# Patient Record
Sex: MALE | Race: OTHER | Employment: OTHER | URBAN - NONMETROPOLITAN AREA
[De-identification: names, ages, dates, MRNs, and addresses within clinical notes are randomized per-mention and may not be internally consistent; named-entity substitution may affect disease eponyms.]

---

## 2018-03-22 ENCOUNTER — HOSPITAL ENCOUNTER (EMERGENCY)
Age: 28
Discharge: HOME | End: 2018-03-22
Attending: EMERGENCY MEDICINE
Payer: COMMERCIAL

## 2018-03-22 VITALS
TEMPERATURE: 97.4 F | RESPIRATION RATE: 16 BRPM | WEIGHT: 197 LBS | SYSTOLIC BLOOD PRESSURE: 129 MMHG | BODY MASS INDEX: 29.18 KG/M2 | OXYGEN SATURATION: 94 % | HEIGHT: 69 IN | HEART RATE: 62 BPM | DIASTOLIC BLOOD PRESSURE: 74 MMHG

## 2018-03-22 DIAGNOSIS — R10.32 LEFT LOWER QUADRANT PAIN: Primary | ICD-10-CM

## 2018-03-22 DIAGNOSIS — Z87.19 HISTORY OF DIVERTICULITIS: ICD-10-CM

## 2018-03-22 LAB
ABSOLUTE EOS #: 0.1 K/UL (ref 0–0.4)
ABSOLUTE IMMATURE GRANULOCYTE: ABNORMAL K/UL (ref 0–0.3)
ABSOLUTE LYMPH #: 3.8 K/UL (ref 1–4.8)
ABSOLUTE MONO #: 1 K/UL (ref 0.1–1.2)
ALBUMIN SERPL-MCNC: 5 G/DL (ref 3.5–5.2)
ALBUMIN/GLOBULIN RATIO: 1.8 (ref 1–2.5)
ALP BLD-CCNC: 91 U/L (ref 40–129)
ALT SERPL-CCNC: 25 U/L (ref 5–41)
ANION GAP SERPL CALCULATED.3IONS-SCNC: 17 MMOL/L (ref 9–17)
AST SERPL-CCNC: 13 U/L
BASOPHILS # BLD: 1 % (ref 0–2)
BASOPHILS ABSOLUTE: 0.1 K/UL (ref 0–0.2)
BILIRUB SERPL-MCNC: 0.79 MG/DL (ref 0.3–1.2)
BUN BLDV-MCNC: 12 MG/DL (ref 6–20)
BUN/CREAT BLD: 14 (ref 9–20)
CALCIUM SERPL-MCNC: 10.1 MG/DL (ref 8.6–10.4)
CHLORIDE BLD-SCNC: 99 MMOL/L (ref 98–107)
CO2: 24 MMOL/L (ref 20–31)
CREAT SERPL-MCNC: 0.84 MG/DL (ref 0.7–1.2)
DIFFERENTIAL TYPE: ABNORMAL
EOSINOPHILS RELATIVE PERCENT: 1 % (ref 1–8)
GFR AFRICAN AMERICAN: >60 ML/MIN
GFR NON-AFRICAN AMERICAN: >60 ML/MIN
GFR SERPL CREATININE-BSD FRML MDRD: ABNORMAL ML/MIN/{1.73_M2}
GFR SERPL CREATININE-BSD FRML MDRD: ABNORMAL ML/MIN/{1.73_M2}
GLUCOSE BLD-MCNC: 109 MG/DL (ref 70–99)
HCT VFR BLD CALC: 47.2 % (ref 41–53)
HEMOGLOBIN: 15.8 G/DL (ref 13.5–17.5)
IMMATURE GRANULOCYTES: ABNORMAL %
LYMPHOCYTES # BLD: 26 % (ref 15–43)
MCH RBC QN AUTO: 30.5 PG (ref 26–34)
MCHC RBC AUTO-ENTMCNC: 33.5 G/DL (ref 31–37)
MCV RBC AUTO: 91.1 FL (ref 80–100)
MONOCYTES # BLD: 7 % (ref 6–14)
NRBC AUTOMATED: ABNORMAL PER 100 WBC
PDW BLD-RTO: 13.7 % (ref 11–14.5)
PLATELET # BLD: 432 K/UL (ref 140–450)
PLATELET ESTIMATE: ABNORMAL
PMV BLD AUTO: 7.7 FL (ref 6–12)
POTASSIUM SERPL-SCNC: 4.1 MMOL/L (ref 3.7–5.3)
RBC # BLD: 5.19 M/UL (ref 4.5–5.9)
RBC # BLD: ABNORMAL 10*6/UL
SEG NEUTROPHILS: 65 % (ref 44–74)
SEGMENTED NEUTROPHILS ABSOLUTE COUNT: 9.5 K/UL (ref 1.8–7.7)
SODIUM BLD-SCNC: 140 MMOL/L (ref 135–144)
TOTAL PROTEIN: 7.8 G/DL (ref 6.4–8.3)
WBC # BLD: 14.5 K/UL (ref 3.5–11)
WBC # BLD: ABNORMAL 10*3/UL

## 2018-03-22 PROCEDURE — 80053 COMPREHEN METABOLIC PANEL: CPT

## 2018-03-22 PROCEDURE — 6360000002 HC RX W HCPCS: Performed by: EMERGENCY MEDICINE

## 2018-03-22 PROCEDURE — 96375 TX/PRO/DX INJ NEW DRUG ADDON: CPT

## 2018-03-22 PROCEDURE — 36415 COLL VENOUS BLD VENIPUNCTURE: CPT

## 2018-03-22 PROCEDURE — 2580000003 HC RX 258: Performed by: EMERGENCY MEDICINE

## 2018-03-22 PROCEDURE — 85025 COMPLETE CBC W/AUTO DIFF WBC: CPT

## 2018-03-22 PROCEDURE — 99284 EMERGENCY DEPT VISIT MOD MDM: CPT

## 2018-03-22 PROCEDURE — 96374 THER/PROPH/DIAG INJ IV PUSH: CPT

## 2018-03-22 RX ORDER — ONDANSETRON 2 MG/ML
4 INJECTION INTRAMUSCULAR; INTRAVENOUS ONCE
Status: COMPLETED | OUTPATIENT
Start: 2018-03-22 | End: 2018-03-22

## 2018-03-22 RX ORDER — ONDANSETRON 4 MG/1
4 TABLET, ORALLY DISINTEGRATING ORAL ONCE
Status: COMPLETED | OUTPATIENT
Start: 2018-03-22 | End: 2018-03-22

## 2018-03-22 RX ORDER — 0.9 % SODIUM CHLORIDE 0.9 %
1000 INTRAVENOUS SOLUTION INTRAVENOUS ONCE
Status: COMPLETED | OUTPATIENT
Start: 2018-03-22 | End: 2018-03-22

## 2018-03-22 RX ORDER — HYDROCODONE BITARTRATE AND ACETAMINOPHEN 5; 325 MG/1; MG/1
1 TABLET ORAL EVERY 6 HOURS PRN
Qty: 20 TABLET | Refills: 0 | Status: SHIPPED | OUTPATIENT
Start: 2018-03-22 | End: 2018-03-29

## 2018-03-22 RX ORDER — METRONIDAZOLE 500 MG/1
500 TABLET ORAL 3 TIMES DAILY
Qty: 21 TABLET | Refills: 0 | Status: SHIPPED | OUTPATIENT
Start: 2018-03-22 | End: 2018-03-29

## 2018-03-22 RX ORDER — DIPHENOXYLATE HYDROCHLORIDE AND ATROPINE SULFATE 2.5; .025 MG/1; MG/1
1 TABLET ORAL 4 TIMES DAILY PRN
Qty: 20 TABLET | Refills: 0 | Status: SHIPPED | OUTPATIENT
Start: 2018-03-22 | End: 2018-04-01

## 2018-03-22 RX ORDER — FENTANYL CITRATE 50 UG/ML
50 INJECTION, SOLUTION INTRAMUSCULAR; INTRAVENOUS ONCE
Status: COMPLETED | OUTPATIENT
Start: 2018-03-22 | End: 2018-03-22

## 2018-03-22 RX ORDER — ONDANSETRON 4 MG/1
4 TABLET, ORALLY DISINTEGRATING ORAL EVERY 8 HOURS PRN
Qty: 20 TABLET | Refills: 0 | Status: SHIPPED | OUTPATIENT
Start: 2018-03-22 | End: 2018-07-30 | Stop reason: SDUPTHER

## 2018-03-22 RX ADMIN — ONDANSETRON 4 MG: 2 INJECTION INTRAMUSCULAR; INTRAVENOUS at 15:48

## 2018-03-22 RX ADMIN — ONDANSETRON 4 MG: 4 TABLET, ORALLY DISINTEGRATING ORAL at 14:38

## 2018-03-22 RX ADMIN — FENTANYL CITRATE 50 MCG: 50 INJECTION INTRAMUSCULAR; INTRAVENOUS at 15:52

## 2018-03-22 RX ADMIN — SODIUM CHLORIDE 1000 ML: 9 INJECTION, SOLUTION INTRAVENOUS at 15:45

## 2018-03-22 ASSESSMENT — PAIN DESCRIPTION - PAIN TYPE
TYPE: ACUTE PAIN
TYPE: ACUTE PAIN

## 2018-03-22 ASSESSMENT — PAIN DESCRIPTION - LOCATION
LOCATION: ABDOMEN
LOCATION: ABDOMEN

## 2018-03-22 ASSESSMENT — PAIN DESCRIPTION - ORIENTATION
ORIENTATION: MID
ORIENTATION: MID;LEFT

## 2018-03-22 ASSESSMENT — PAIN DESCRIPTION - DESCRIPTORS: DESCRIPTORS: BURNING

## 2018-03-22 ASSESSMENT — PAIN SCALES - GENERAL
PAINLEVEL_OUTOF10: 2
PAINLEVEL_OUTOF10: 6

## 2018-03-22 ASSESSMENT — PAIN DESCRIPTION - FREQUENCY: FREQUENCY: INTERMITTENT

## 2018-03-22 NOTE — ED PROVIDER NOTES
indicated that the status of his brother is unknown.      family history includes Allergies in his brother; Cancer in his maternal uncle; Crohn's Disease in his maternal uncle; Kidney Disease in his mother; Other in his mother. SOCIAL HISTORY      reports that he has been smoking Cigarettes. He has been smoking about 0.50 packs per day. He has never used smokeless tobacco. He reports that he drinks about 1.2 oz of alcohol per week . He reports that he does not use drugs. PHYSICAL EXAM     INITIAL VITALS:  height is 5' 9\" (1.753 m) and weight is 197 lb (89.4 kg). His tympanic temperature is 97.4 °F (36.3 °C). His blood pressure is 129/74 and his pulse is 62. His respiration is 16 and oxygen saturation is 94%. Patient is alert and oriented, in no apparent distress. HEENT is atraumatic. Pupils are PERRL at 4 mm. Mucous membranes moist.    Neck is supple with no lymphadenopathy. No JVD. No meningismus. Heart sounds regular rate and rhythm with no gallops, murmurs, or rubs. Lungs clear, no wheezes, rales or rhonchi. Abdomen: soft, nontender with no pain to palpation. Normal bowel sounds are noted. No rebound or guarding. Musculoskeletal exam shows no evidence of trauma. Skin: no rash or edema. Neurological exam reveals cranial nerves 2 through 12 grossly intact. Patient has equal  and normal deep tendon reflexes. Psychiatric: appropriate. Lymphatics.:  No lymphadenopathy.        DIFFERENTIAL DIAGNOSIS/ MDM:     Diverticulitis, anemia, GI bleed    DIAGNOSTIC RESULTS         LABS:  Results for orders placed or performed during the hospital encounter of 03/22/18   CBC Auto Differential   Result Value Ref Range    WBC 14.5 (H) 3.5 - 11.0 k/uL    RBC 5.19 4.5 - 5.9 m/uL    Hemoglobin 15.8 13.5 - 17.5 g/dL    Hematocrit 47.2 41 - 53 %    MCV 91.1 80 - 100 fL    MCH 30.5 26 - 34 pg    MCHC 33.5 31 - 37 g/dL    RDW 13.7 11.0 - 14.5 %    Platelets 369 888 - 920 k/uL    MPV 7.7 6.0 - 12.0 fL    NRBC Automated NOT REPORTED per 100 WBC    Differential Type NOT REPORTED     Immature Granulocytes NOT REPORTED 0 %    Absolute Immature Granulocyte NOT REPORTED 0.00 - 0.30 k/uL    WBC Morphology NOT REPORTED     RBC Morphology NOT REPORTED     Platelet Estimate NOT REPORTED     Seg Neutrophils 65 44 - 74 %    Lymphocytes 26 15 - 43 %    Monocytes 7 6 - 14 %    Eosinophils % 1 1 - 8 %    Basophils 1 0 - 2 %    Segs Absolute 9.50 (H) 1.8 - 7.7 k/uL    Absolute Lymph # 3.80 1.0 - 4.8 k/uL    Absolute Mono # 1.00 0.1 - 1.2 k/uL    Absolute Eos # 0.10 0.0 - 0.4 k/uL    Basophils # 0.10 0.0 - 0.2 k/uL   Comprehensive Metabolic Panel   Result Value Ref Range    Glucose 109 (H) 70 - 99 mg/dL    BUN 12 6 - 20 mg/dL    CREATININE 0.84 0.70 - 1.20 mg/dL    Bun/Cre Ratio 14 9 - 20    Calcium 10.1 8.6 - 10.4 mg/dL    Sodium 140 135 - 144 mmol/L    Potassium 4.1 3.7 - 5.3 mmol/L    Chloride 99 98 - 107 mmol/L    CO2 24 20 - 31 mmol/L    Anion Gap 17 9 - 17 mmol/L    Alkaline Phosphatase 91 40 - 129 U/L    ALT 25 5 - 41 U/L    AST 13 <40 U/L    Total Bilirubin 0.79 0.3 - 1.2 mg/dL    Total Protein 7.8 6.4 - 8.3 g/dL    Alb 5.0 3.5 - 5.2 g/dL    Albumin/Globulin Ratio 1.8 1.0 - 2.5    GFR Non-African American >60 >60 mL/min    GFR African American >60 >60 mL/min    GFR Comment          GFR Staging NOT REPORTED          EMERGENCY DEPARTMENT COURSE:   Vitals:    Vitals:    03/22/18 1427   BP: 129/74   Pulse: 62   Resp: 16   Temp: 97.4 °F (36.3 °C)   TempSrc: Tympanic   SpO2: 94%   Weight: 197 lb (89.4 kg)   Height: 5' 9\" (1.753 m)     -------------------------  BP: 129/74, Temp: 97.4 °F (36.3 °C), Pulse: 62, Resp: 16      Re-evaluation Notes    The patient has a slightly elevated white count consistent with diverticulitis. I will treat him accordingly with Flagyl. I have written for medicine for nausea, vomiting, and pain. The patient had improvement with medications in the emergency department.   He would like to try going home. I do not think he has a surgical presentation. He is discharged in good condition. Controlled Substances Monitoring: The Prescription Monitoring Report for this patient was reviewed today. Tammi Chavarria MD)        FINAL IMPRESSION      1. Left lower quadrant pain    2. History of diverticulitis          DISPOSITION/PLAN   DISPOSITION        Condition on Disposition    good    PATIENT REFERRED TO:  your doctor    In 3 days        DISCHARGE MEDICATIONS:  New Prescriptions    DIPHENOXYLATE-ATROPINE (LOMOTIL) 2.5-0.025 MG PER TABLET    Take 1 tablet by mouth 4 times daily as needed for Diarrhea for up to 10 days. HYDROCODONE-ACETAMINOPHEN (NORCO) 5-325 MG PER TABLET    Take 1 tablet by mouth every 6 hours as needed for Pain for up to 7 days.     METRONIDAZOLE (FLAGYL) 500 MG TABLET    Take 1 tablet by mouth 3 times daily for 7 days    ONDANSETRON (ZOFRAN ODT) 4 MG DISINTEGRATING TABLET    Take 1 tablet by mouth every 8 hours as needed for Nausea       (Please note that portions of this note were completed with a voice recognition program.  Efforts were made to edit the dictations but occasionally words are mis-transcribed.)    Linn MD   Attending Emergency Physician         Tammi Chavarria MD  03/22/18 0852

## 2018-06-01 ENCOUNTER — HOSPITAL ENCOUNTER (EMERGENCY)
Age: 28
Discharge: HOME | End: 2018-06-01
Attending: EMERGENCY MEDICINE
Payer: MEDICAID

## 2018-06-01 ENCOUNTER — APPOINTMENT (OUTPATIENT)
Dept: CT IMAGING | Age: 28
End: 2018-06-01
Payer: MEDICAID

## 2018-06-01 ENCOUNTER — APPOINTMENT (OUTPATIENT)
Dept: ULTRASOUND IMAGING | Age: 28
End: 2018-06-01
Payer: MEDICAID

## 2018-06-01 VITALS
SYSTOLIC BLOOD PRESSURE: 128 MMHG | WEIGHT: 200 LBS | TEMPERATURE: 98.6 F | RESPIRATION RATE: 12 BRPM | BODY MASS INDEX: 29.53 KG/M2 | HEART RATE: 58 BPM | OXYGEN SATURATION: 99 % | DIASTOLIC BLOOD PRESSURE: 86 MMHG

## 2018-06-01 DIAGNOSIS — R10.9 RIGHT FLANK PAIN: Primary | ICD-10-CM

## 2018-06-01 LAB
-: ABNORMAL
ABSOLUTE EOS #: 0.1 K/UL (ref 0–0.4)
ABSOLUTE IMMATURE GRANULOCYTE: ABNORMAL K/UL (ref 0–0.3)
ABSOLUTE LYMPH #: 3.8 K/UL (ref 1–4.8)
ABSOLUTE MONO #: 0.7 K/UL (ref 0.1–1.2)
ALBUMIN SERPL-MCNC: 4.4 G/DL (ref 3.5–5.2)
ALBUMIN/GLOBULIN RATIO: 1.5 (ref 1–2.5)
ALP BLD-CCNC: 89 U/L (ref 40–129)
ALT SERPL-CCNC: 18 U/L (ref 5–41)
AMORPHOUS: ABNORMAL
AMYLASE: 62 U/L (ref 28–100)
ANION GAP SERPL CALCULATED.3IONS-SCNC: 18 MMOL/L (ref 9–17)
AST SERPL-CCNC: 11 U/L
BACTERIA: ABNORMAL
BASOPHILS # BLD: 0 % (ref 0–2)
BASOPHILS ABSOLUTE: 0.1 K/UL (ref 0–0.2)
BILIRUB SERPL-MCNC: 0.67 MG/DL (ref 0.3–1.2)
BILIRUBIN DIRECT: 0.14 MG/DL
BILIRUBIN URINE: NEGATIVE
BILIRUBIN, INDIRECT: 0.53 MG/DL (ref 0–1)
BUN BLDV-MCNC: 10 MG/DL (ref 6–20)
BUN/CREAT BLD: 13 (ref 9–20)
CALCIUM SERPL-MCNC: 9.5 MG/DL (ref 8.6–10.4)
CASTS UA: ABNORMAL /LPF (ref 0–2)
CHLORIDE BLD-SCNC: 102 MMOL/L (ref 98–107)
CO2: 19 MMOL/L (ref 20–31)
COLOR: ABNORMAL
COMMENT UA: ABNORMAL
CREAT SERPL-MCNC: 0.75 MG/DL (ref 0.7–1.2)
CRYSTALS, UA: ABNORMAL /HPF
DIFFERENTIAL TYPE: ABNORMAL
EOSINOPHILS RELATIVE PERCENT: 1 % (ref 1–8)
EPITHELIAL CELLS UA: ABNORMAL /HPF (ref 0–5)
GFR AFRICAN AMERICAN: >60 ML/MIN
GFR NON-AFRICAN AMERICAN: >60 ML/MIN
GFR SERPL CREATININE-BSD FRML MDRD: ABNORMAL ML/MIN/{1.73_M2}
GFR SERPL CREATININE-BSD FRML MDRD: ABNORMAL ML/MIN/{1.73_M2}
GLOBULIN: 3 G/DL (ref 1.5–3.8)
GLUCOSE BLD-MCNC: 110 MG/DL (ref 70–99)
GLUCOSE URINE: NEGATIVE
HCT VFR BLD CALC: 42.8 % (ref 41–53)
HEMOGLOBIN: 14.4 G/DL (ref 13.5–17.5)
IMMATURE GRANULOCYTES: ABNORMAL %
KETONES, URINE: ABNORMAL
LEUKOCYTE ESTERASE, URINE: NEGATIVE
LIPASE: 16 U/L (ref 13–60)
LYMPHOCYTES # BLD: 27 % (ref 15–43)
MCH RBC QN AUTO: 30.9 PG (ref 26–34)
MCHC RBC AUTO-ENTMCNC: 33.6 G/DL (ref 31–37)
MCV RBC AUTO: 91.8 FL (ref 80–100)
MONOCYTES # BLD: 5 % (ref 6–14)
MUCUS: ABNORMAL
NITRITE, URINE: NEGATIVE
NRBC AUTOMATED: ABNORMAL PER 100 WBC
OTHER OBSERVATIONS UA: ABNORMAL
PDW BLD-RTO: 13.7 % (ref 11–14.5)
PH UA: 8 (ref 5–6)
PLATELET # BLD: 421 K/UL (ref 140–450)
PLATELET ESTIMATE: ABNORMAL
PMV BLD AUTO: 7.6 FL (ref 6–12)
POTASSIUM SERPL-SCNC: 3.7 MMOL/L (ref 3.7–5.3)
PROTEIN UA: NEGATIVE
RBC # BLD: 4.67 M/UL (ref 4.5–5.9)
RBC # BLD: ABNORMAL 10*6/UL
RBC UA: ABNORMAL /HPF (ref 0–4)
RENAL EPITHELIAL, UA: ABNORMAL /HPF
SEG NEUTROPHILS: 67 % (ref 44–74)
SEGMENTED NEUTROPHILS ABSOLUTE COUNT: 9.8 K/UL (ref 1.8–7.7)
SODIUM BLD-SCNC: 139 MMOL/L (ref 135–144)
SPECIFIC GRAVITY UA: 1.02 (ref 1.01–1.02)
TOTAL PROTEIN: 7.4 G/DL (ref 6.4–8.3)
TRICHOMONAS: ABNORMAL
TURBIDITY: ABNORMAL
URINE HGB: NEGATIVE
UROBILINOGEN, URINE: NORMAL
WBC # BLD: 14.4 K/UL (ref 3.5–11)
WBC # BLD: ABNORMAL 10*3/UL
WBC UA: ABNORMAL /HPF (ref 0–4)
YEAST: ABNORMAL

## 2018-06-01 PROCEDURE — 96375 TX/PRO/DX INJ NEW DRUG ADDON: CPT

## 2018-06-01 PROCEDURE — 82150 ASSAY OF AMYLASE: CPT

## 2018-06-01 PROCEDURE — 2580000003 HC RX 258: Performed by: EMERGENCY MEDICINE

## 2018-06-01 PROCEDURE — 85025 COMPLETE CBC W/AUTO DIFF WBC: CPT

## 2018-06-01 PROCEDURE — 6360000002 HC RX W HCPCS

## 2018-06-01 PROCEDURE — 99284 EMERGENCY DEPT VISIT MOD MDM: CPT

## 2018-06-01 PROCEDURE — 96374 THER/PROPH/DIAG INJ IV PUSH: CPT

## 2018-06-01 PROCEDURE — 76705 ECHO EXAM OF ABDOMEN: CPT

## 2018-06-01 PROCEDURE — 80076 HEPATIC FUNCTION PANEL: CPT

## 2018-06-01 PROCEDURE — 81001 URINALYSIS AUTO W/SCOPE: CPT

## 2018-06-01 PROCEDURE — 6360000002 HC RX W HCPCS: Performed by: EMERGENCY MEDICINE

## 2018-06-01 PROCEDURE — 96376 TX/PRO/DX INJ SAME DRUG ADON: CPT

## 2018-06-01 PROCEDURE — 80048 BASIC METABOLIC PNL TOTAL CA: CPT

## 2018-06-01 PROCEDURE — 74176 CT ABD & PELVIS W/O CONTRAST: CPT

## 2018-06-01 PROCEDURE — 83690 ASSAY OF LIPASE: CPT

## 2018-06-01 PROCEDURE — 36415 COLL VENOUS BLD VENIPUNCTURE: CPT

## 2018-06-01 RX ORDER — ONDANSETRON 2 MG/ML
4 INJECTION INTRAMUSCULAR; INTRAVENOUS ONCE
Status: COMPLETED | OUTPATIENT
Start: 2018-06-01 | End: 2018-06-01

## 2018-06-01 RX ORDER — IBUPROFEN 600 MG/1
600 TABLET ORAL EVERY 6 HOURS PRN
Qty: 30 TABLET | Refills: 0 | Status: SHIPPED | OUTPATIENT
Start: 2018-06-01 | End: 2018-07-30 | Stop reason: ALTCHOICE

## 2018-06-01 RX ORDER — MORPHINE SULFATE 4 MG/ML
4 INJECTION, SOLUTION INTRAMUSCULAR; INTRAVENOUS ONCE
Status: COMPLETED | OUTPATIENT
Start: 2018-06-01 | End: 2018-06-01

## 2018-06-01 RX ORDER — ONDANSETRON 4 MG/1
4 TABLET, ORALLY DISINTEGRATING ORAL EVERY 8 HOURS PRN
Qty: 20 TABLET | Refills: 0 | Status: SHIPPED | OUTPATIENT
Start: 2018-06-01 | End: 2018-07-30 | Stop reason: CLARIF

## 2018-06-01 RX ORDER — KETOROLAC TROMETHAMINE 30 MG/ML
INJECTION, SOLUTION INTRAMUSCULAR; INTRAVENOUS
Status: COMPLETED
Start: 2018-06-01 | End: 2018-06-01

## 2018-06-01 RX ORDER — HYDROCODONE BITARTRATE AND ACETAMINOPHEN 5; 325 MG/1; MG/1
1 TABLET ORAL EVERY 6 HOURS PRN
Qty: 20 TABLET | Refills: 0 | Status: SHIPPED | OUTPATIENT
Start: 2018-06-01 | End: 2018-06-08

## 2018-06-01 RX ORDER — 0.9 % SODIUM CHLORIDE 0.9 %
1000 INTRAVENOUS SOLUTION INTRAVENOUS ONCE
Status: COMPLETED | OUTPATIENT
Start: 2018-06-01 | End: 2018-06-01

## 2018-06-01 RX ORDER — KETOROLAC TROMETHAMINE 30 MG/ML
15 INJECTION, SOLUTION INTRAMUSCULAR; INTRAVENOUS ONCE
Status: COMPLETED | OUTPATIENT
Start: 2018-06-01 | End: 2018-06-01

## 2018-06-01 RX ADMIN — MORPHINE SULFATE 4 MG: 4 INJECTION INTRAVENOUS at 15:07

## 2018-06-01 RX ADMIN — MORPHINE SULFATE 4 MG: 4 INJECTION INTRAVENOUS at 13:52

## 2018-06-01 RX ADMIN — KETOROLAC TROMETHAMINE 15 MG: 30 INJECTION, SOLUTION INTRAMUSCULAR at 14:40

## 2018-06-01 RX ADMIN — KETOROLAC TROMETHAMINE 15 MG: 30 INJECTION, SOLUTION INTRAMUSCULAR; INTRAVENOUS at 14:40

## 2018-06-01 RX ADMIN — ONDANSETRON 4 MG: 2 INJECTION INTRAMUSCULAR; INTRAVENOUS at 15:02

## 2018-06-01 RX ADMIN — Medication 4 MG: at 13:53

## 2018-06-01 RX ADMIN — SODIUM CHLORIDE 1000 ML: 9 INJECTION, SOLUTION INTRAVENOUS at 13:52

## 2018-06-01 ASSESSMENT — PAIN DESCRIPTION - LOCATION: LOCATION: ABDOMEN

## 2018-06-01 ASSESSMENT — PAIN SCALES - GENERAL
PAINLEVEL_OUTOF10: 2
PAINLEVEL_OUTOF10: 2
PAINLEVEL_OUTOF10: 8
PAINLEVEL_OUTOF10: 6
PAINLEVEL_OUTOF10: 8
PAINLEVEL_OUTOF10: 5

## 2018-06-01 ASSESSMENT — PAIN DESCRIPTION - DESCRIPTORS: DESCRIPTORS: SHARP

## 2018-06-01 ASSESSMENT — PAIN DESCRIPTION - ONSET: ONSET: ON-GOING

## 2018-06-01 ASSESSMENT — PAIN DESCRIPTION - ORIENTATION: ORIENTATION: LEFT

## 2018-06-01 ASSESSMENT — PAIN DESCRIPTION - FREQUENCY: FREQUENCY: INTERMITTENT

## 2018-06-01 ASSESSMENT — PAIN DESCRIPTION - PAIN TYPE: TYPE: ACUTE PAIN

## 2018-06-01 ASSESSMENT — PAIN DESCRIPTION - PROGRESSION: CLINICAL_PROGRESSION: NOT CHANGED

## 2018-07-23 RX ORDER — SUCRALFATE 1 G/1
1 TABLET ORAL 4 TIMES DAILY
COMMUNITY
End: 2018-07-30 | Stop reason: SDUPTHER

## 2018-07-23 RX ORDER — PANTOPRAZOLE SODIUM 20 MG/1
20 TABLET, DELAYED RELEASE ORAL DAILY
COMMUNITY
End: 2018-07-30 | Stop reason: SDUPTHER

## 2018-07-23 RX ORDER — TRAMADOL HYDROCHLORIDE 50 MG/1
50 TABLET ORAL EVERY 6 HOURS PRN
COMMUNITY
End: 2018-07-30 | Stop reason: ALTCHOICE

## 2018-07-30 ENCOUNTER — HOSPITAL ENCOUNTER (OUTPATIENT)
Dept: LAB | Age: 28
Setting detail: SPECIMEN
Discharge: HOME | End: 2018-07-30
Payer: MEDICAID

## 2018-07-30 ENCOUNTER — OFFICE VISIT (OUTPATIENT)
Dept: FAMILY MEDICINE CLINIC | Age: 28
End: 2018-07-30
Payer: MEDICAID

## 2018-07-30 VITALS
HEIGHT: 70 IN | BODY MASS INDEX: 26.14 KG/M2 | SYSTOLIC BLOOD PRESSURE: 140 MMHG | DIASTOLIC BLOOD PRESSURE: 90 MMHG | OXYGEN SATURATION: 99 % | TEMPERATURE: 97.7 F | WEIGHT: 182.6 LBS | HEART RATE: 67 BPM

## 2018-07-30 DIAGNOSIS — Z11.4 SCREENING FOR HIV (HUMAN IMMUNODEFICIENCY VIRUS): ICD-10-CM

## 2018-07-30 DIAGNOSIS — Z13.1 SCREENING FOR DIABETES MELLITUS: ICD-10-CM

## 2018-07-30 DIAGNOSIS — K52.9 CHRONIC DIARRHEA: ICD-10-CM

## 2018-07-30 DIAGNOSIS — Z13.220 SCREENING CHOLESTEROL LEVEL: ICD-10-CM

## 2018-07-30 DIAGNOSIS — R10.84 GENERALIZED ABDOMINAL PAIN: ICD-10-CM

## 2018-07-30 DIAGNOSIS — R10.84 GENERALIZED ABDOMINAL PAIN: Primary | ICD-10-CM

## 2018-07-30 LAB
ABSOLUTE EOS #: 0.1 K/UL (ref 0–0.4)
ABSOLUTE IMMATURE GRANULOCYTE: ABNORMAL K/UL (ref 0–0.3)
ABSOLUTE LYMPH #: 2.2 K/UL (ref 1–4.8)
ABSOLUTE MONO #: 0.7 K/UL (ref 0.1–1.2)
ALBUMIN SERPL-MCNC: 4.9 G/DL (ref 3.5–5.2)
ALBUMIN/GLOBULIN RATIO: 1.5 (ref 1–2.5)
ALP BLD-CCNC: 93 U/L (ref 40–129)
ALT SERPL-CCNC: 20 U/L (ref 5–41)
AMYLASE: 72 U/L (ref 28–100)
ANION GAP SERPL CALCULATED.3IONS-SCNC: 17 MMOL/L (ref 9–17)
AST SERPL-CCNC: 12 U/L
BASOPHILS # BLD: 1 % (ref 0–2)
BASOPHILS ABSOLUTE: 0.1 K/UL (ref 0–0.2)
BILIRUB SERPL-MCNC: 0.37 MG/DL (ref 0.3–1.2)
BUN BLDV-MCNC: 11 MG/DL (ref 6–20)
BUN/CREAT BLD: 14 (ref 9–20)
CALCIUM SERPL-MCNC: 9.8 MG/DL (ref 8.6–10.4)
CHLORIDE BLD-SCNC: 103 MMOL/L (ref 98–107)
CHOLESTEROL/HDL RATIO: 5.7
CHOLESTEROL: 212 MG/DL
CO2: 22 MMOL/L (ref 20–31)
CREAT SERPL-MCNC: 0.79 MG/DL (ref 0.7–1.2)
DIFFERENTIAL TYPE: ABNORMAL
EOSINOPHILS RELATIVE PERCENT: 0 % (ref 1–8)
ESTIMATED AVERAGE GLUCOSE: 103 MG/DL
GFR AFRICAN AMERICAN: >60 ML/MIN
GFR NON-AFRICAN AMERICAN: >60 ML/MIN
GFR SERPL CREATININE-BSD FRML MDRD: ABNORMAL ML/MIN/{1.73_M2}
GFR SERPL CREATININE-BSD FRML MDRD: ABNORMAL ML/MIN/{1.73_M2}
GLUCOSE BLD-MCNC: 108 MG/DL (ref 70–99)
HBA1C MFR BLD: 5.2 % (ref 4.8–5.9)
HCT VFR BLD CALC: 46 % (ref 41–53)
HDLC SERPL-MCNC: 37 MG/DL
HEMOGLOBIN: 15.5 G/DL (ref 13.5–17.5)
HIV AG/AB: NONREACTIVE
IMMATURE GRANULOCYTES: ABNORMAL %
LDL CHOLESTEROL: 157 MG/DL (ref 0–130)
LIPASE: 21 U/L (ref 13–60)
LYMPHOCYTES # BLD: 13 % (ref 15–43)
MCH RBC QN AUTO: 31.2 PG (ref 26–34)
MCHC RBC AUTO-ENTMCNC: 33.7 G/DL (ref 31–37)
MCV RBC AUTO: 92.6 FL (ref 80–100)
MONOCYTES # BLD: 5 % (ref 6–14)
NRBC AUTOMATED: ABNORMAL PER 100 WBC
PDW BLD-RTO: 13.7 % (ref 11–14.5)
PLATELET # BLD: 436 K/UL (ref 140–450)
PLATELET ESTIMATE: ABNORMAL
PMV BLD AUTO: 7.8 FL (ref 6–12)
POTASSIUM SERPL-SCNC: 3.8 MMOL/L (ref 3.7–5.3)
RBC # BLD: 4.97 M/UL (ref 4.5–5.9)
RBC # BLD: ABNORMAL 10*6/UL
SEG NEUTROPHILS: 81 % (ref 44–74)
SEGMENTED NEUTROPHILS ABSOLUTE COUNT: 13.4 K/UL (ref 1.8–7.7)
SODIUM BLD-SCNC: 142 MMOL/L (ref 135–144)
TOTAL PROTEIN: 8.1 G/DL (ref 6.4–8.3)
TRIGL SERPL-MCNC: 88 MG/DL
TSH SERPL DL<=0.05 MIU/L-ACNC: 0.38 MIU/L (ref 0.3–5)
VLDLC SERPL CALC-MCNC: ABNORMAL MG/DL (ref 1–30)
WBC # BLD: 16.5 K/UL (ref 3.5–11)
WBC # BLD: ABNORMAL 10*3/UL

## 2018-07-30 PROCEDURE — 84443 ASSAY THYROID STIM HORMONE: CPT

## 2018-07-30 PROCEDURE — 87389 HIV-1 AG W/HIV-1&-2 AB AG IA: CPT

## 2018-07-30 PROCEDURE — G8427 DOCREV CUR MEDS BY ELIG CLIN: HCPCS | Performed by: FAMILY MEDICINE

## 2018-07-30 PROCEDURE — 80053 COMPREHEN METABOLIC PANEL: CPT

## 2018-07-30 PROCEDURE — G8419 CALC BMI OUT NRM PARAM NOF/U: HCPCS | Performed by: FAMILY MEDICINE

## 2018-07-30 PROCEDURE — 83690 ASSAY OF LIPASE: CPT

## 2018-07-30 PROCEDURE — 99214 OFFICE O/P EST MOD 30 MIN: CPT | Performed by: FAMILY MEDICINE

## 2018-07-30 PROCEDURE — 82150 ASSAY OF AMYLASE: CPT

## 2018-07-30 PROCEDURE — 36415 COLL VENOUS BLD VENIPUNCTURE: CPT

## 2018-07-30 PROCEDURE — 85025 COMPLETE CBC W/AUTO DIFF WBC: CPT

## 2018-07-30 PROCEDURE — 83036 HEMOGLOBIN GLYCOSYLATED A1C: CPT

## 2018-07-30 PROCEDURE — 1036F TOBACCO NON-USER: CPT | Performed by: FAMILY MEDICINE

## 2018-07-30 PROCEDURE — 80061 LIPID PANEL: CPT

## 2018-07-30 RX ORDER — PANTOPRAZOLE SODIUM 20 MG/1
20 TABLET, DELAYED RELEASE ORAL 2 TIMES DAILY
Qty: 60 TABLET | Refills: 5 | Status: SHIPPED | OUTPATIENT
Start: 2018-07-30 | End: 2018-09-24 | Stop reason: SDUPTHER

## 2018-07-30 RX ORDER — SUCRALFATE 1 G/1
1 TABLET ORAL 4 TIMES DAILY
Qty: 120 TABLET | Refills: 5 | Status: SHIPPED | OUTPATIENT
Start: 2018-07-30 | End: 2019-04-03 | Stop reason: SDUPTHER

## 2018-07-30 RX ORDER — METOCLOPRAMIDE 5 MG/1
1 TABLET ORAL EVERY 6 HOURS PRN
COMMUNITY
Start: 2018-07-25 | End: 2018-07-30

## 2018-07-30 RX ORDER — DICYCLOMINE HYDROCHLORIDE 10 MG/1
10 CAPSULE ORAL 4 TIMES DAILY
Qty: 120 CAPSULE | Refills: 3 | Status: SHIPPED | OUTPATIENT
Start: 2018-07-30 | End: 2018-09-24 | Stop reason: SDUPTHER

## 2018-07-30 RX ORDER — PROMETHAZINE HYDROCHLORIDE 25 MG/1
1 TABLET ORAL EVERY 6 HOURS PRN
COMMUNITY
Start: 2018-07-25 | End: 2018-07-30 | Stop reason: SDUPTHER

## 2018-07-30 RX ORDER — PROMETHAZINE HYDROCHLORIDE 25 MG/1
25 TABLET ORAL EVERY 6 HOURS PRN
Qty: 30 TABLET | Refills: 2 | Status: SHIPPED | OUTPATIENT
Start: 2018-07-30 | End: 2019-04-03 | Stop reason: SDUPTHER

## 2018-07-30 ASSESSMENT — ENCOUNTER SYMPTOMS
BLOOD IN STOOL: 1
COUGH: 0
NAUSEA: 1
WHEEZING: 0
BLOATING: 1
BELCHING: 1
DIARRHEA: 1
CONSTIPATION: 1
VOMITING: 1
ABDOMINAL DISTENTION: 1
SHORTNESS OF BREATH: 0
CHEST TIGHTNESS: 1
FLATUS: 1
ABDOMINAL PAIN: 1

## 2018-07-30 ASSESSMENT — PATIENT HEALTH QUESTIONNAIRE - PHQ9
SUM OF ALL RESPONSES TO PHQ9 QUESTIONS 1 & 2: 2
2. FEELING DOWN, DEPRESSED OR HOPELESS: 1
SUM OF ALL RESPONSES TO PHQ QUESTIONS 1-9: 2
1. LITTLE INTEREST OR PLEASURE IN DOING THINGS: 1

## 2018-07-30 NOTE — PROGRESS NOTES
[address] [facility]  Dept: [phone]  Dept Fax: [phone]  Loc: [phone]    Pawan Taylor is a 33 y.o. male who presents today for his medical conditions/complaints as noted below. Pawan Taylor is c/o of   Chief Complaint   Patient presents with   [place] [facility]    Diarrhea     pt states having trouble pooping but has diarrhea 2-3 times a day, has ulcers in both large and small colon       HPI:     Here today for diarrhea and abdominal pain. Diarrhea    This is a chronic problem. The current episode started more than 1 year ago (6 years). The problem occurs more than 10 times per day. The problem has been unchanged. The stool consistency is described as bloody and watery. The patient states that diarrhea does not awaken him from sleep. Associated symptoms include abdominal pain, bloating, increased flatus, vomiting and weight loss. Pertinent negatives include no chills, coughing, fever or headaches. The symptoms are aggravated by dairy products and rye/wheat. There are no known risk factors. Treatments tried: reglan. The treatment provided no relief. mom has IBS and uncle has chron's, he has ulcers in his intestines and diverticulosis   Abdominal Pain   This is a chronic problem. The current episode started more than 1 year ago (6 years). The onset quality is gradual. The problem occurs daily. The problem has been gradually worsening. The pain is located in the generalized abdominal region. The pain is at a severity of 10/10. The pain is severe. The quality of the pain is colicky, cramping and a sensation of fullness. The abdominal pain does not radiate. Associated symptoms include belching, constipation, diarrhea, flatus, nausea, vomiting and weight loss. Pertinent negatives include no dysuria, fever, frequency, headaches or hematuria. The pain is aggravated by eating.  The pain is relieved by certain positions, bowel Specialty:   General Surgery     Number of Visits Requested:   1     Orders Placed This Encounter   Medications    dicyclomine (BENTYL) 10 MG capsule     Sig: Take 1 capsule by mouth 4 times daily     Dispense:  120 capsule     Refill:  3    pantoprazole (PROTONIX) 20 MG tablet     Sig: Take 1 tablet by mouth 2 times daily     Dispense:  60 tablet     Refill:  5    sucralfate (CARAFATE) 1 GM tablet     Sig: Take 1 tablet by mouth 4 times daily     Dispense:  120 tablet     Refill:  5    promethazine (PHENERGAN) 25 MG tablet     Sig: Take 1 tablet by mouth every 6 hours as needed for Nausea     Dispense:  30 tablet     Refill:  2       Patient given educational materials - see patient instructions. Discussed use, benefit, and side effects of prescribed medications. All patient questions answered. Pt voiced understanding. Reviewed health maintenance. Instructed to continue current medications, diet and exercise. Patient agreed with treatment plan. Follow up as directed.      Electronically signed by Yamil Bledsoe MD on 7/30/2018 at 7:11 PM

## 2018-08-02 ENCOUNTER — TELEPHONE (OUTPATIENT)
Dept: FAMILY MEDICINE CLINIC | Age: 28
End: 2018-08-02

## 2018-08-07 ENCOUNTER — INITIAL CONSULT (OUTPATIENT)
Dept: SURGERY | Age: 28
End: 2018-08-07
Payer: MEDICARE

## 2018-08-07 VITALS
HEIGHT: 70 IN | BODY MASS INDEX: 25.62 KG/M2 | WEIGHT: 179 LBS | DIASTOLIC BLOOD PRESSURE: 56 MMHG | SYSTOLIC BLOOD PRESSURE: 120 MMHG | HEART RATE: 72 BPM | TEMPERATURE: 98.5 F | RESPIRATION RATE: 18 BRPM

## 2018-08-07 DIAGNOSIS — K52.9 CHRONIC DIARRHEA: Primary | ICD-10-CM

## 2018-08-07 DIAGNOSIS — R10.84 GENERALIZED ABDOMINAL PAIN: ICD-10-CM

## 2018-08-07 PROCEDURE — 99203 OFFICE O/P NEW LOW 30 MIN: CPT | Performed by: SURGERY

## 2018-08-07 PROCEDURE — 1036F TOBACCO NON-USER: CPT | Performed by: SURGERY

## 2018-08-07 PROCEDURE — G8427 DOCREV CUR MEDS BY ELIG CLIN: HCPCS | Performed by: SURGERY

## 2018-08-07 PROCEDURE — G8419 CALC BMI OUT NRM PARAM NOF/U: HCPCS | Performed by: SURGERY

## 2018-08-07 ASSESSMENT — ENCOUNTER SYMPTOMS
NAUSEA: 1
DIARRHEA: 1
RESPIRATORY NEGATIVE: 1
ABDOMINAL DISTENTION: 1
ABDOMINAL PAIN: 1
EYES NEGATIVE: 1
VOMITING: 1
ALLERGIC/IMMUNOLOGIC NEGATIVE: 1

## 2018-08-07 NOTE — PROGRESS NOTES
normal  CT and ultrasound down here in June were okay. CT scan down at [address] in April and June was okay    He has also had lab work and CT scans done at [facility] and [address] [place] [address] Assessment:      1) Abdominal Pain working diagnosis of IBS - Patient has moved around a lot and get diagnostic procedures and workup at multiple different places. Some of those results we don't have access to. The fact that he supposedly had ulcerations on his scopes would point away from this being IBS in be what ever the ulcerations or inflammation was. Apparently was a told at one time he had Crohn's disease and was actually on treatment for it. Additionally I am not sure he follows to the R pays attention was done form. He is asking refills for medication that Dr. Heladio Rich prescribed only days ago. Plan:      1) Track down his recent endoscopy reports including path reports and consultation. Will see him back in a week or so and decide what if anything else should be done. - He definitely doesn't need another CT scan. - I am not sure he is reliable, or why he is going to multiple different places for evaluation.

## 2018-08-22 ENCOUNTER — TELEPHONE (OUTPATIENT)
Dept: PRIMARY CARE CLINIC | Age: 28
End: 2018-08-22

## 2018-08-22 ENCOUNTER — APPOINTMENT (OUTPATIENT)
Dept: CT IMAGING | Age: 28
End: 2018-08-22
Payer: MEDICARE

## 2018-08-22 ENCOUNTER — HOSPITAL ENCOUNTER (EMERGENCY)
Age: 28
Discharge: HOME | End: 2018-08-22
Attending: EMERGENCY MEDICINE
Payer: MEDICARE

## 2018-08-22 VITALS
BODY MASS INDEX: 23.99 KG/M2 | DIASTOLIC BLOOD PRESSURE: 78 MMHG | HEIGHT: 69 IN | HEART RATE: 81 BPM | TEMPERATURE: 97.9 F | WEIGHT: 162 LBS | OXYGEN SATURATION: 100 % | RESPIRATION RATE: 18 BRPM | SYSTOLIC BLOOD PRESSURE: 118 MMHG

## 2018-08-22 DIAGNOSIS — R10.9 CHRONIC ABDOMINAL PAIN: Primary | ICD-10-CM

## 2018-08-22 DIAGNOSIS — G89.29 CHRONIC ABDOMINAL PAIN: Primary | ICD-10-CM

## 2018-08-22 LAB
-: ABNORMAL
ABSOLUTE EOS #: 0 K/UL (ref 0–0.4)
ABSOLUTE IMMATURE GRANULOCYTE: ABNORMAL K/UL (ref 0–0.3)
ABSOLUTE LYMPH #: 3.4 K/UL (ref 1–4.8)
ABSOLUTE MONO #: 1 K/UL (ref 0.1–1.2)
ALBUMIN SERPL-MCNC: 4.9 G/DL (ref 3.5–5.2)
ALBUMIN/GLOBULIN RATIO: 1.4 (ref 1–2.5)
ALP BLD-CCNC: 89 U/L (ref 40–129)
ALT SERPL-CCNC: 17 U/L (ref 5–41)
AMORPHOUS: ABNORMAL
AMYLASE: 45 U/L (ref 28–100)
ANION GAP SERPL CALCULATED.3IONS-SCNC: 23 MMOL/L (ref 9–17)
AST SERPL-CCNC: 10 U/L
BACTERIA: ABNORMAL
BASOPHILS # BLD: 1 % (ref 0–2)
BASOPHILS ABSOLUTE: 0.1 K/UL (ref 0–0.2)
BILIRUB SERPL-MCNC: 1.16 MG/DL (ref 0.3–1.2)
BILIRUBIN DIRECT: 0.23 MG/DL
BILIRUBIN URINE: ABNORMAL
BILIRUBIN, INDIRECT: 0.93 MG/DL (ref 0–1)
BUN BLDV-MCNC: 15 MG/DL (ref 6–20)
BUN/CREAT BLD: 16 (ref 9–20)
CALCIUM SERPL-MCNC: 10.2 MG/DL (ref 8.6–10.4)
CASTS UA: ABNORMAL /LPF (ref 0–2)
CHLORIDE BLD-SCNC: 99 MMOL/L (ref 98–107)
CO2: 15 MMOL/L (ref 20–31)
COLOR: ABNORMAL
COMMENT UA: ABNORMAL
CREAT SERPL-MCNC: 0.92 MG/DL (ref 0.7–1.2)
CRYSTALS, UA: ABNORMAL /HPF
DIFFERENTIAL TYPE: ABNORMAL
EOSINOPHILS RELATIVE PERCENT: 0 % (ref 1–8)
EPITHELIAL CELLS UA: ABNORMAL /HPF (ref 0–5)
GFR AFRICAN AMERICAN: >60 ML/MIN
GFR NON-AFRICAN AMERICAN: >60 ML/MIN
GFR SERPL CREATININE-BSD FRML MDRD: ABNORMAL ML/MIN/{1.73_M2}
GFR SERPL CREATININE-BSD FRML MDRD: ABNORMAL ML/MIN/{1.73_M2}
GLOBULIN: 3.6 G/DL (ref 1.5–3.8)
GLUCOSE BLD-MCNC: 106 MG/DL (ref 70–99)
GLUCOSE URINE: NEGATIVE
HCT VFR BLD CALC: 47 % (ref 41–53)
HEMOGLOBIN: 16.1 G/DL (ref 13.5–17.5)
IMMATURE GRANULOCYTES: ABNORMAL %
KETONES, URINE: ABNORMAL
LEUKOCYTE ESTERASE, URINE: NEGATIVE
LIPASE: 14 U/L (ref 13–60)
LYMPHOCYTES # BLD: 25 % (ref 15–43)
MCH RBC QN AUTO: 30.9 PG (ref 26–34)
MCHC RBC AUTO-ENTMCNC: 34.2 G/DL (ref 31–37)
MCV RBC AUTO: 90.4 FL (ref 80–100)
MONOCYTES # BLD: 7 % (ref 6–14)
MUCUS: ABNORMAL
MYOGLOBIN: 32 NG/ML (ref 28–72)
NITRITE, URINE: NEGATIVE
NRBC AUTOMATED: ABNORMAL PER 100 WBC
OTHER OBSERVATIONS UA: ABNORMAL
PDW BLD-RTO: 13.5 % (ref 11–14.5)
PH UA: 6 (ref 5–6)
PLATELET # BLD: 480 K/UL (ref 140–450)
PLATELET ESTIMATE: ABNORMAL
PMV BLD AUTO: 7.6 FL (ref 6–12)
POTASSIUM SERPL-SCNC: 3.6 MMOL/L (ref 3.7–5.3)
PROTEIN UA: ABNORMAL
RBC # BLD: 5.2 M/UL (ref 4.5–5.9)
RBC # BLD: ABNORMAL 10*6/UL
RBC UA: ABNORMAL /HPF (ref 0–4)
RENAL EPITHELIAL, UA: ABNORMAL /HPF
SEG NEUTROPHILS: 67 % (ref 44–74)
SEGMENTED NEUTROPHILS ABSOLUTE COUNT: 9.1 K/UL (ref 1.8–7.7)
SODIUM BLD-SCNC: 137 MMOL/L (ref 135–144)
SPECIFIC GRAVITY UA: 1.01 (ref 1.01–1.02)
TOTAL PROTEIN: 8.5 G/DL (ref 6.4–8.3)
TRICHOMONAS: ABNORMAL
TURBIDITY: ABNORMAL
URINE HGB: NEGATIVE
UROBILINOGEN, URINE: NORMAL
WBC # BLD: 13.6 K/UL (ref 3.5–11)
WBC # BLD: ABNORMAL 10*3/UL
WBC UA: ABNORMAL /HPF (ref 0–4)
YEAST: ABNORMAL

## 2018-08-22 PROCEDURE — 2580000003 HC RX 258: Performed by: EMERGENCY MEDICINE

## 2018-08-22 PROCEDURE — 81001 URINALYSIS AUTO W/SCOPE: CPT

## 2018-08-22 PROCEDURE — 85025 COMPLETE CBC W/AUTO DIFF WBC: CPT

## 2018-08-22 PROCEDURE — 82150 ASSAY OF AMYLASE: CPT

## 2018-08-22 PROCEDURE — 36415 COLL VENOUS BLD VENIPUNCTURE: CPT

## 2018-08-22 PROCEDURE — 80048 BASIC METABOLIC PNL TOTAL CA: CPT

## 2018-08-22 PROCEDURE — 6360000004 HC RX CONTRAST MEDICATION: Performed by: EMERGENCY MEDICINE

## 2018-08-22 PROCEDURE — 83874 ASSAY OF MYOGLOBIN: CPT

## 2018-08-22 PROCEDURE — 87086 URINE CULTURE/COLONY COUNT: CPT

## 2018-08-22 PROCEDURE — 99284 EMERGENCY DEPT VISIT MOD MDM: CPT

## 2018-08-22 PROCEDURE — 83690 ASSAY OF LIPASE: CPT

## 2018-08-22 PROCEDURE — 80076 HEPATIC FUNCTION PANEL: CPT

## 2018-08-22 PROCEDURE — 6360000002 HC RX W HCPCS: Performed by: EMERGENCY MEDICINE

## 2018-08-22 PROCEDURE — 2709999900 CT ABDOMEN PELVIS W IV CONTRAST

## 2018-08-22 PROCEDURE — 96374 THER/PROPH/DIAG INJ IV PUSH: CPT

## 2018-08-22 PROCEDURE — 96375 TX/PRO/DX INJ NEW DRUG ADDON: CPT

## 2018-08-22 RX ORDER — 0.9 % SODIUM CHLORIDE 0.9 %
1000 INTRAVENOUS SOLUTION INTRAVENOUS ONCE
Status: COMPLETED | OUTPATIENT
Start: 2018-08-22 | End: 2018-08-22

## 2018-08-22 RX ORDER — KETOROLAC TROMETHAMINE 30 MG/ML
30 INJECTION, SOLUTION INTRAMUSCULAR; INTRAVENOUS ONCE
Status: COMPLETED | OUTPATIENT
Start: 2018-08-22 | End: 2018-08-22

## 2018-08-22 RX ORDER — ONDANSETRON 2 MG/ML
4 INJECTION INTRAMUSCULAR; INTRAVENOUS ONCE
Status: COMPLETED | OUTPATIENT
Start: 2018-08-22 | End: 2018-08-22

## 2018-08-22 RX ADMIN — KETOROLAC TROMETHAMINE 30 MG: 30 INJECTION, SOLUTION INTRAMUSCULAR at 15:10

## 2018-08-22 RX ADMIN — IOPAMIDOL 100 ML: 755 INJECTION, SOLUTION INTRAVENOUS at 15:35

## 2018-08-22 RX ADMIN — ONDANSETRON 4 MG: 2 INJECTION INTRAMUSCULAR; INTRAVENOUS at 15:10

## 2018-08-22 RX ADMIN — SODIUM CHLORIDE 1000 ML: 9 INJECTION, SOLUTION INTRAVENOUS at 15:09

## 2018-08-22 ASSESSMENT — PAIN SCALES - GENERAL
PAINLEVEL_OUTOF10: 4
PAINLEVEL_OUTOF10: 4
PAINLEVEL_OUTOF10: 7

## 2018-08-22 ASSESSMENT — PAIN DESCRIPTION - PAIN TYPE: TYPE: ACUTE PAIN

## 2018-08-22 ASSESSMENT — PAIN DESCRIPTION - ORIENTATION: ORIENTATION: RIGHT;UPPER

## 2018-08-22 ASSESSMENT — PAIN DESCRIPTION - LOCATION: LOCATION: ABDOMEN

## 2018-08-22 NOTE — ED PROVIDER NOTES
comfortably      CRITICAL CARE:   None        CONSULTS:      PROCEDURES:  None    FINAL IMPRESSION      1. Chronic abdominal pain          DISPOSITION/PLAN   DISPOSITION Discharged    Condition on Disposition    Stable    PATIENT REFERRED TO:  Dana Barraza MD  [address] [place]  [phone]    Schedule an appointment as soon as possible for a visit in 3 days        DISCHARGE MEDICATIONS:  New Prescriptions    No medications on file       (Please note that portions of this note were completed with a voice recognition program.  Efforts were made to edit the dictations but occasionally words are mis-transcribed.)    Peck MD, F.A.A.E.M.   Attending Emergency Physician                            Ev Cronin MD  08/22/18 6068

## 2018-08-22 NOTE — ED NOTES
Pt states pain is improved stating \"It's better. I'm going to try to sleep. \" Pt informed of need for clean catch urine specimen. Urinal and cleansing towelette provided.      Nola Bhatti RN  08/22/18 9504

## 2018-08-22 NOTE — ED NOTES
Pt very dramatic while in department. Pt requests assistance from 2 people to transition from wheelchair to cot next to wheelchair after CT scan. Pt states \"it still hurts\" and patient still appears very anxious. Pt is repeatedly encouraged to slow breathing and attempt to breath in through his nose and out through his mouth. Skin PWD, MMM, resp even and NL. Pt and patient's family repeatedly and anxiously asking questions about if these symptoms are related to his recent cessation of heavy marijuana smoking 2 days ago. Pt states he needs to get into inpatient rehab because he doesn't have the willpower to do it on his own.      Isidoro Tillman RN  08/22/18 6422

## 2018-08-22 NOTE — ED NOTES
Pt sleeping with resp even and NL. Family at bedside. No acute distress. IVF infusing.      Tennille Antunez RN  08/22/18 1430

## 2018-08-23 ENCOUNTER — OFFICE VISIT (OUTPATIENT)
Dept: FAMILY MEDICINE CLINIC | Age: 28
End: 2018-08-23
Payer: MEDICARE

## 2018-08-23 VITALS
SYSTOLIC BLOOD PRESSURE: 122 MMHG | HEART RATE: 84 BPM | WEIGHT: 167.2 LBS | BODY MASS INDEX: 24.69 KG/M2 | OXYGEN SATURATION: 98 % | TEMPERATURE: 99.2 F | DIASTOLIC BLOOD PRESSURE: 84 MMHG

## 2018-08-23 DIAGNOSIS — F41.1 GAD (GENERALIZED ANXIETY DISORDER): Primary | ICD-10-CM

## 2018-08-23 PROCEDURE — G8427 DOCREV CUR MEDS BY ELIG CLIN: HCPCS | Performed by: FAMILY MEDICINE

## 2018-08-23 PROCEDURE — G8420 CALC BMI NORM PARAMETERS: HCPCS | Performed by: FAMILY MEDICINE

## 2018-08-23 PROCEDURE — 1036F TOBACCO NON-USER: CPT | Performed by: FAMILY MEDICINE

## 2018-08-23 PROCEDURE — 99214 OFFICE O/P EST MOD 30 MIN: CPT | Performed by: FAMILY MEDICINE

## 2018-08-23 PROCEDURE — 96160 PT-FOCUSED HLTH RISK ASSMT: CPT | Performed by: FAMILY MEDICINE

## 2018-08-23 RX ORDER — TRAZODONE HYDROCHLORIDE 50 MG/1
50 TABLET ORAL NIGHTLY
Qty: 30 TABLET | Refills: 1 | Status: SHIPPED | OUTPATIENT
Start: 2018-08-23 | End: 2018-09-24 | Stop reason: DRUGHIGH

## 2018-08-23 ASSESSMENT — PATIENT HEALTH QUESTIONNAIRE - PHQ9
4. FEELING TIRED OR HAVING LITTLE ENERGY: 3
SUM OF ALL RESPONSES TO PHQ QUESTIONS 1-9: 21
9. THOUGHTS THAT YOU WOULD BE BETTER OFF DEAD, OR OF HURTING YOURSELF: 0
SUM OF ALL RESPONSES TO PHQ QUESTIONS 1-9: 21
1. LITTLE INTEREST OR PLEASURE IN DOING THINGS: 3
10. IF YOU CHECKED OFF ANY PROBLEMS, HOW DIFFICULT HAVE THESE PROBLEMS MADE IT FOR YOU TO DO YOUR WORK, TAKE CARE OF THINGS AT HOME, OR GET ALONG WITH OTHER PEOPLE: 2
3. TROUBLE FALLING OR STAYING ASLEEP: 3
SUM OF ALL RESPONSES TO PHQ9 QUESTIONS 1 & 2: 4
7. TROUBLE CONCENTRATING ON THINGS, SUCH AS READING THE NEWSPAPER OR WATCHING TELEVISION: 2
8. MOVING OR SPEAKING SO SLOWLY THAT OTHER PEOPLE COULD HAVE NOTICED. OR THE OPPOSITE, BEING SO FIGETY OR RESTLESS THAT YOU HAVE BEEN MOVING AROUND A LOT MORE THAN USUAL: 3
5. POOR APPETITE OR OVEREATING: 3
6. FEELING BAD ABOUT YOURSELF - OR THAT YOU ARE A FAILURE OR HAVE LET YOURSELF OR YOUR FAMILY DOWN: 3
2. FEELING DOWN, DEPRESSED OR HOPELESS: 1

## 2018-08-23 ASSESSMENT — ENCOUNTER SYMPTOMS
ABDOMINAL PAIN: 0
COUGH: 0
SHORTNESS OF BREATH: 1
WHEEZING: 0
CHEST TIGHTNESS: 0
DIARRHEA: 0
NAUSEA: 0
CONSTIPATION: 0

## 2018-08-23 NOTE — LETTER
[facility] [address] [place] [address]  Phone: [phone]  Fax: [phone]    Dio Davis MD        August 23, 2018     Patient: Fred Parisi   YOB: 1990   Date of Visit: 8/23/2018       To Whom It May Concern: It is my medical opinion that Christel Degroot may return to work on 9/24/18. If you have any questions or concerns, please don't hesitate to call.     Sincerely,        Dio Davis MD

## 2018-08-23 NOTE — PROGRESS NOTES
[address] [facility]  Dept: [phone]  Dept Fax: [phone]  Loc: [phone]    Johnie Ceballos is a 33 y.o. male who presents today for his medical conditions/complaints as noted below. Johnie Ceballos is c/o of   Chief Complaint   Patient presents with    Anxiety     he is experiencing a lot of anxiety, can't sleep     Depression     takes a shower about 12 times a day - he states he can cry in the shower,  his dad has passed away, his geno passed  away- was hit by a train, bills are overwhelming,        HPI:     HPI Here today for anxiety. Anxiety: worsening; it has gotten really bad over the past few weeks. He has had issues with anxiety off and on for a few years. He is not sleeping at all right now. He is crying multiple times a day. He is not eating because he is anxious and he is not eating. He had previously been treating his anxiety with marijuana for the past 15+ years. He is feeling weak and drained. He is just pacing at night because he can't sleep. No thoughts of suicide. He is living for his son. He is not leaving his house hardly at all because he is so anxious.        Past Medical History:   Diagnosis Date    Acid reflux     Appendicitis     when 11years old    Irritable bowel syndrome     Ulcer           Social History   Substance Use Topics    Smoking status: Former Smoker     Packs/day: 0.50     Types: Cigarettes    Smokeless tobacco: Never Used    Alcohol use 1.2 oz/week     2 Cans of beer per week      Comment: stopped drinking 7 months ago - Jan 2018      Current Outpatient Prescriptions   Medication Sig Dispense Refill    traZODone (DESYREL) 50 MG tablet Take 1 tablet by mouth nightly 30 tablet 1    sertraline (ZOLOFT) 50 MG tablet Take 1 tablet by mouth daily 30 tablet 1    dicyclomine (BENTYL) 10 MG capsule Take 1 capsule by mouth 4 times daily 120 capsule 3    pantoprazole (PROTONIX) 20 MG tablet Take 1 tablet by mouth 2 times daily 60 tablet 5    sucralfate (CARAFATE) 1 GM tablet Take 1 tablet by mouth 4 times daily 120 tablet 5    promethazine (PHENERGAN) 25 MG tablet Take 1 tablet by mouth every 6 hours as needed for Nausea 30 tablet 2     No current facility-administered medications for this visit. No Known Allergies    Subjective:      Review of Systems   Constitutional: Negative for activity change, appetite change, fatigue and fever. Respiratory: Positive for shortness of breath (during panic attacks). Negative for cough, chest tightness and wheezing. Cardiovascular: Positive for palpitations (during panic attacks). Negative for chest pain and leg swelling. Gastrointestinal: Negative for abdominal pain, constipation, diarrhea and nausea. Skin: Negative for rash. Neurological: Negative for syncope and headaches. Psychiatric/Behavioral: Positive for agitation. Negative for dysphoric mood, self-injury, sleep disturbance and suicidal ideas. The patient is nervous/anxious. Objective:     Physical Exam   Constitutional: He is oriented to person, place, and time. He appears well-developed and well-nourished. No distress. Neck: Normal range of motion. Neck supple. Cardiovascular: Normal rate, regular rhythm, normal heart sounds and intact distal pulses. No murmur heard. Pulmonary/Chest: Effort normal and breath sounds normal. No respiratory distress. He has no wheezes. He has no rales. Musculoskeletal: He exhibits no edema. Lymphadenopathy:     He has no cervical adenopathy. Neurological: He is alert and oriented to person, place, and time. Skin: Skin is warm and dry. No rash noted. Psychiatric: His speech is normal and behavior is normal. Judgment and thought content normal. His mood appears anxious. Cognition and memory are normal. He does not exhibit a depressed mood. He expresses no homicidal and no suicidal ideation.  He expresses no suicidal plans and no

## 2018-08-23 NOTE — PATIENT INSTRUCTIONS
problems. Talking to others sometimes relieves stress. · Get involved in social groups, or volunteer to help others. Being alone sometimes makes things seem worse than they are. · Get at least 30 minutes of exercise on most days of the week to relieve stress. Walking is a good choice. You also may want to do other activities, such as running, swimming, cycling, or playing tennis or team sports. Relaxation techniques  Do relaxation exercises for 10 to 20 minutes a day. You can play soothing, relaxing music while you do them, if you wish. · Tell others in your house that you are going to do your relaxation exercises. Ask them not to disturb you. · Find a comfortable place, away from all distractions and noise. · Lie down on your back, or sit with your back straight. · Focus on your breathing. Make it slow and steady. · Breathe in through your nose. Breathe out through either your nose or mouth. · Breathe deeply, filling up the area between your navel and your rib cage. Breathe so that your belly goes up and down. · Do not hold your breath. · Breathe like this for 5 to 10 minutes. Notice the feeling of calmness throughout your whole body. As you continue to breathe slowly and deeply, relax by doing the following for another 5 to 10 minutes:  · Tighten and relax each muscle group in your body. You can begin at your toes and work your way up to your head. · Imagine your muscle groups relaxing and becoming heavy. · Empty your mind of all thoughts. · Let yourself relax more and more deeply. · Become aware of the state of calmness that surrounds you. · When your relaxation time is over, you can bring yourself back to alertness by moving your fingers and toes and then your hands and feet and then stretching and moving your entire body. Sometimes people fall asleep during relaxation, but they usually wake up shortly afterward.   · Always give yourself time to return to full alertness before you drive a car or do anything that might cause an accident if you are not fully alert. Never play a relaxation tape while driving a car. When should you call for help? Call 911 anytime you think you may need emergency care. For example, call if:    · You feel you cannot stop from hurting yourself or someone else.    Watch closely for changes in your health, and be sure to contact your doctor if:    · Your panic attacks get worse.     · You have new or different anxiety.     · You are not getting better as expected. Where can you learn more? Go to https://[employer]. org and sign in to your [employer] account. Enter H601 in the [employer] box to learn more about \"Panic Attacks: Care Instructions. \"     If you do not have an account, please click on the \"Sign Up Now\" link. Current as of: December 7, 2017  Content Version: 11.7  © 3436-5914 [employer]. Care instructions adapted under license by [facility] ([facility]). If you have questions about a medical condition or this instruction, always ask your healthcare professional. [address] any warranty or liability for your use of this information.

## 2018-08-24 ENCOUNTER — HOSPITAL ENCOUNTER (EMERGENCY)
Age: 28
Discharge: PSYCHIATRIC OR SUBSTANCE USE FACILITY | End: 2018-08-24
Attending: EMERGENCY MEDICINE
Payer: MEDICARE

## 2018-08-24 VITALS
DIASTOLIC BLOOD PRESSURE: 80 MMHG | BODY MASS INDEX: 23.91 KG/M2 | WEIGHT: 167 LBS | TEMPERATURE: 99.4 F | RESPIRATION RATE: 16 BRPM | SYSTOLIC BLOOD PRESSURE: 132 MMHG | HEIGHT: 70 IN | HEART RATE: 72 BPM | OXYGEN SATURATION: 98 %

## 2018-08-24 DIAGNOSIS — R10.9 ABDOMINAL PAIN, UNSPECIFIED ABDOMINAL LOCATION: ICD-10-CM

## 2018-08-24 DIAGNOSIS — R45.851 SUICIDAL IDEATION: Primary | ICD-10-CM

## 2018-08-24 LAB
-: ABNORMAL
ABSOLUTE EOS #: 0 K/UL (ref 0–0.4)
ABSOLUTE IMMATURE GRANULOCYTE: ABNORMAL K/UL (ref 0–0.3)
ABSOLUTE LYMPH #: 2.9 K/UL (ref 1–4.8)
ABSOLUTE MONO #: 0.8 K/UL (ref 0.1–1.2)
ALBUMIN SERPL-MCNC: 4.8 G/DL (ref 3.5–5.2)
ALBUMIN/GLOBULIN RATIO: 1.4 (ref 1–2.5)
ALP BLD-CCNC: 81 U/L (ref 40–129)
ALT SERPL-CCNC: 18 U/L (ref 5–41)
AMORPHOUS: ABNORMAL
AMPHETAMINE SCREEN URINE: NEGATIVE
ANION GAP SERPL CALCULATED.3IONS-SCNC: 21 MMOL/L (ref 9–17)
AST SERPL-CCNC: 11 U/L
BACTERIA: ABNORMAL
BARBITURATE SCREEN URINE: NEGATIVE
BASOPHILS # BLD: 0 % (ref 0–2)
BASOPHILS ABSOLUTE: 0 K/UL (ref 0–0.2)
BENZODIAZEPINE SCREEN, URINE: NEGATIVE
BILIRUB SERPL-MCNC: 1.33 MG/DL (ref 0.3–1.2)
BILIRUBIN URINE: ABNORMAL
BUN BLDV-MCNC: 14 MG/DL (ref 6–20)
BUN/CREAT BLD: 15 (ref 9–20)
BUPRENORPHINE URINE: NEGATIVE
CALCIUM SERPL-MCNC: 10 MG/DL (ref 8.6–10.4)
CANNABINOID SCREEN URINE: POSITIVE
CASTS UA: ABNORMAL /LPF (ref 0–2)
CHLORIDE BLD-SCNC: 99 MMOL/L (ref 98–107)
CO2: 16 MMOL/L (ref 20–31)
COCAINE METABOLITE, URINE: NEGATIVE
COLOR: ABNORMAL
COMMENT UA: ABNORMAL
CREAT SERPL-MCNC: 0.95 MG/DL (ref 0.7–1.2)
CRYSTALS, UA: ABNORMAL /HPF
CULTURE: NO GROWTH
DIFFERENTIAL TYPE: ABNORMAL
EOSINOPHILS RELATIVE PERCENT: 0 % (ref 1–8)
EPITHELIAL CELLS UA: ABNORMAL /HPF (ref 0–5)
ETHANOL PERCENT: NORMAL %
ETHANOL: <10 MG/DL
GFR AFRICAN AMERICAN: >60 ML/MIN
GFR NON-AFRICAN AMERICAN: >60 ML/MIN
GFR SERPL CREATININE-BSD FRML MDRD: ABNORMAL ML/MIN/{1.73_M2}
GFR SERPL CREATININE-BSD FRML MDRD: ABNORMAL ML/MIN/{1.73_M2}
GLUCOSE BLD-MCNC: 96 MG/DL (ref 70–99)
GLUCOSE URINE: NEGATIVE
HCT VFR BLD CALC: 44 % (ref 41–53)
HEMOGLOBIN: 15.3 G/DL (ref 13.5–17.5)
IMMATURE GRANULOCYTES: ABNORMAL %
KETONES, URINE: ABNORMAL
LEUKOCYTE ESTERASE, URINE: NEGATIVE
LIPASE: 28 U/L (ref 13–60)
LYMPHOCYTES # BLD: 24 % (ref 15–43)
Lab: NORMAL
MCH RBC QN AUTO: 31.3 PG (ref 26–34)
MCHC RBC AUTO-ENTMCNC: 34.8 G/DL (ref 31–37)
MCV RBC AUTO: 89.9 FL (ref 80–100)
MDMA URINE: ABNORMAL
METHADONE SCREEN, URINE: NEGATIVE
METHAMPHETAMINE, URINE: NEGATIVE
MONOCYTES # BLD: 7 % (ref 6–14)
MUCUS: ABNORMAL
NITRITE, URINE: NEGATIVE
NRBC AUTOMATED: ABNORMAL PER 100 WBC
OPIATES, URINE: NEGATIVE
OTHER OBSERVATIONS UA: ABNORMAL
OXYCODONE SCREEN URINE: NEGATIVE
PDW BLD-RTO: 13.2 % (ref 11–14.5)
PH UA: 6 (ref 5–6)
PHENCYCLIDINE, URINE: NEGATIVE
PLATELET # BLD: 401 K/UL (ref 140–450)
PLATELET ESTIMATE: ABNORMAL
PMV BLD AUTO: 7.5 FL (ref 6–12)
POTASSIUM SERPL-SCNC: 3.4 MMOL/L (ref 3.7–5.3)
PROPOXYPHENE, URINE: NEGATIVE
PROTEIN UA: ABNORMAL
RBC # BLD: 4.9 M/UL (ref 4.5–5.9)
RBC # BLD: ABNORMAL 10*6/UL
RBC UA: ABNORMAL /HPF (ref 0–4)
RENAL EPITHELIAL, UA: ABNORMAL /HPF
SEG NEUTROPHILS: 69 % (ref 44–74)
SEGMENTED NEUTROPHILS ABSOLUTE COUNT: 8.4 K/UL (ref 1.8–7.7)
SODIUM BLD-SCNC: 136 MMOL/L (ref 135–144)
SPECIFIC GRAVITY UA: 1.03 (ref 1.01–1.02)
SPECIMEN DESCRIPTION: NORMAL
STATUS: NORMAL
TEST INFORMATION: ABNORMAL
TOTAL PROTEIN: 8.2 G/DL (ref 6.4–8.3)
TRICHOMONAS: ABNORMAL
TRICYCLIC ANTIDEPRESSANTS, UR: NEGATIVE
TSH SERPL DL<=0.05 MIU/L-ACNC: 0.7 MIU/L (ref 0.3–5)
TURBIDITY: ABNORMAL
URINE HGB: NEGATIVE
UROBILINOGEN, URINE: NORMAL
WBC # BLD: 12.2 K/UL (ref 3.5–11)
WBC # BLD: ABNORMAL 10*3/UL
WBC UA: ABNORMAL /HPF (ref 0–4)
YEAST: ABNORMAL

## 2018-08-24 PROCEDURE — G0480 DRUG TEST DEF 1-7 CLASSES: HCPCS

## 2018-08-24 PROCEDURE — 96374 THER/PROPH/DIAG INJ IV PUSH: CPT

## 2018-08-24 PROCEDURE — 6360000002 HC RX W HCPCS: Performed by: EMERGENCY MEDICINE

## 2018-08-24 PROCEDURE — 84443 ASSAY THYROID STIM HORMONE: CPT

## 2018-08-24 PROCEDURE — 36415 COLL VENOUS BLD VENIPUNCTURE: CPT

## 2018-08-24 PROCEDURE — 85025 COMPLETE CBC W/AUTO DIFF WBC: CPT

## 2018-08-24 PROCEDURE — 81001 URINALYSIS AUTO W/SCOPE: CPT

## 2018-08-24 PROCEDURE — 99285 EMERGENCY DEPT VISIT HI MDM: CPT

## 2018-08-24 PROCEDURE — 83690 ASSAY OF LIPASE: CPT

## 2018-08-24 PROCEDURE — 6360000002 HC RX W HCPCS

## 2018-08-24 PROCEDURE — 80306 DRUG TEST PRSMV INSTRMNT: CPT

## 2018-08-24 PROCEDURE — 80053 COMPREHEN METABOLIC PANEL: CPT

## 2018-08-24 RX ORDER — LORAZEPAM 2 MG/ML
INJECTION INTRAMUSCULAR
Status: COMPLETED
Start: 2018-08-24 | End: 2018-08-24

## 2018-08-24 RX ORDER — ONDANSETRON 4 MG/1
4 TABLET, ORALLY DISINTEGRATING ORAL ONCE
Status: COMPLETED | OUTPATIENT
Start: 2018-08-24 | End: 2018-08-24

## 2018-08-24 RX ORDER — LORAZEPAM 2 MG/ML
1 INJECTION INTRAMUSCULAR ONCE
Status: COMPLETED | OUTPATIENT
Start: 2018-08-24 | End: 2018-08-24

## 2018-08-24 RX ADMIN — ONDANSETRON 4 MG: 4 TABLET, ORALLY DISINTEGRATING ORAL at 05:52

## 2018-08-24 RX ADMIN — LORAZEPAM 1 MG: 2 INJECTION INTRAMUSCULAR at 08:51

## 2018-08-24 RX ADMIN — LORAZEPAM 1 MG: 2 INJECTION INTRAMUSCULAR; INTRAVENOUS at 08:51

## 2018-08-24 ASSESSMENT — PATIENT HEALTH QUESTIONNAIRE - PHQ9: SUM OF ALL RESPONSES TO PHQ QUESTIONS 1-9: 16

## 2018-08-24 ASSESSMENT — PAIN DESCRIPTION - ORIENTATION: ORIENTATION: RIGHT;UPPER

## 2018-08-24 ASSESSMENT — PAIN SCALES - GENERAL
PAINLEVEL_OUTOF10: 3
PAINLEVEL_OUTOF10: 7

## 2018-08-24 ASSESSMENT — PAIN DESCRIPTION - LOCATION
LOCATION: ABDOMEN
LOCATION: ABDOMEN

## 2018-08-24 NOTE — ED PROVIDER NOTES
(1.778 m)        -------------------------  BP: 132/80, Temp: 99.4 °F (37.4 °C), Pulse: 72, Resp: 16          FINAL IMPRESSION      1. Suicidal ideation    2. Abdominal pain, unspecified abdominal location          DISPOSITION/PLAN   DISPOSITION Decision To Transfer 08/24/2018 12:05:56 PM      Condition on Disposition    Stable    PATIENT REFERRED TO:  No follow-up provider specified.     DISCHARGE MEDICATIONS:  New Prescriptions    No medications on file       (Please note that portions of this note were completed with a voice recognition program.  Efforts were made to edit the dictations but occasionally words are mis-transcribed.)    Mandy Montoya,, DO  Attending Emergency Physician       Mandy Montoya,   08/24/18 5036

## 2018-08-24 NOTE — ED NOTES
Mother approached nurse in [facility] and stated pt voiced wanting to harm self earlier in the day, stated he is was marijuana user in the past but has quit within the last 2 weeks and would like him to be screened for rehab or for stress center. Dr. Hudson Laws notified.       David Zafar RN  08/24/18 6775

## 2018-08-24 NOTE — ED PROVIDER NOTES
eMERGENCY dEPARTMENT eNCOUnter      Pt Name: Marialuisa Dougherty  MRN: [id number]  [place] 1990  Date of evaluation: 8/24/2018      CHIEF COMPLAINT       Chief Complaint   Patient presents with    Panic Attack    Abdominal Pain     states hasnt ate in the last 3 days except half a banana    Nausea         HISTORY OF PRESENT ILLNESS    Marialuisa Dougherty is a 33 y.o. male who presents With anxiety and abdominal pain. Patient states for months has been having problems for anxiety, panic attacks. He states over last several months has lost 30 pounds weight because is not eating well because of face, anxiety and panic attacks. Says the last 3 days. It is a little sore for a couple of bananas and brought says he was evaluated. Couple of days goes for his abdominal pain, all which was negative seen by his primary care yesterday, was started on 2 new medicines for his anxiety. He states his been up pacing since midnight because of this. Finally, decided come in for evaluation. On the way in squad to give him some Versed and Zofran, is feeling better from that sample. But not totally clear. He does have stressors. He has had several people die in his life. He states, is overwhelmed by pills  Initially denied any suicidal, homicidal thoughts, however, he later asked me to come back in room. He says he has thought about hurting himself, but he was very vague about this. When asked, he says yes. He has thought about hurting himself. When asked how he says yes. He thought he would hurt himself. He then says maybe he which is cut his wrists. He had talked his primary, yesterday, states that he has a son at home, so we would not hurt himself now is telling me that he does not care. He just wants to be done with everything        REVIEW OF SYSTEMS       Review of systems are all reviewed and negative except stated above in HPI     [address]    has a past medical history of Acid reflux;  Appendicitis; (ZOFRAN-ODT) disintegrating tablet 4 mg           Re-evaluation Notes    At this time, care we turned over to Dr Adorno for final disposition and diagnosis    CRITICAL CARE:   None      CONSULTS:      PROCEDURES:  None    FINAL IMPRESSION    No diagnosis found. DISPOSITION/PLAN   DISPOSITION        Condition on Disposition        PATIENT REFERRED TO:  No follow-up provider specified. DISCHARGE MEDICATIONS:  New Prescriptions    No medications on file       (Please note that portions of this note were completed with a voice recognition program.  Efforts were made to edit the dictations but occasionally words are mis-transcribed.)    Wayne MD, F.A.C.E.P.   Attending Emergency Physician        Mari Cervantes MD  08/24/18 3285

## 2018-09-18 ENCOUNTER — HOSPITAL ENCOUNTER (EMERGENCY)
Age: 28
Discharge: HOME | End: 2018-09-18
Attending: EMERGENCY MEDICINE
Payer: MEDICARE

## 2018-09-18 VITALS
WEIGHT: 174 LBS | SYSTOLIC BLOOD PRESSURE: 112 MMHG | HEART RATE: 88 BPM | DIASTOLIC BLOOD PRESSURE: 76 MMHG | TEMPERATURE: 98.3 F | RESPIRATION RATE: 14 BRPM | BODY MASS INDEX: 24.97 KG/M2 | OXYGEN SATURATION: 99 %

## 2018-09-18 DIAGNOSIS — F41.0 PANIC ATTACK: Primary | ICD-10-CM

## 2018-09-18 LAB
ABSOLUTE EOS #: 0 K/UL (ref 0–0.4)
ABSOLUTE IMMATURE GRANULOCYTE: ABNORMAL K/UL (ref 0–0.3)
ABSOLUTE LYMPH #: 2.3 K/UL (ref 1–4.8)
ABSOLUTE MONO #: 0.7 K/UL (ref 0.1–1.2)
ACETAMINOPHEN LEVEL: <5 UG/ML (ref 10–30)
ANION GAP SERPL CALCULATED.3IONS-SCNC: 16 MMOL/L (ref 9–17)
BASOPHILS # BLD: 0 % (ref 0–2)
BASOPHILS ABSOLUTE: 0 K/UL (ref 0–0.2)
BUN BLDV-MCNC: 13 MG/DL (ref 6–20)
BUN/CREAT BLD: 18 (ref 9–20)
CALCIUM SERPL-MCNC: 9.4 MG/DL (ref 8.6–10.4)
CHLORIDE BLD-SCNC: 104 MMOL/L (ref 98–107)
CO2: 20 MMOL/L (ref 20–31)
CREAT SERPL-MCNC: 0.73 MG/DL (ref 0.7–1.2)
DIFFERENTIAL TYPE: ABNORMAL
EKG ATRIAL RATE: 64 BPM
EKG P AXIS: 52 DEGREES
EKG P-R INTERVAL: 142 MS
EKG Q-T INTERVAL: 400 MS
EKG QRS DURATION: 86 MS
EKG QTC CALCULATION (BAZETT): 412 MS
EKG R AXIS: 41 DEGREES
EKG T AXIS: 35 DEGREES
EKG VENTRICULAR RATE: 64 BPM
EOSINOPHILS RELATIVE PERCENT: 0 % (ref 1–8)
ETHANOL PERCENT: ABNORMAL %
ETHANOL: <10 MG/DL
GFR AFRICAN AMERICAN: >60 ML/MIN
GFR NON-AFRICAN AMERICAN: >60 ML/MIN
GFR SERPL CREATININE-BSD FRML MDRD: ABNORMAL ML/MIN/{1.73_M2}
GFR SERPL CREATININE-BSD FRML MDRD: ABNORMAL ML/MIN/{1.73_M2}
GLUCOSE BLD-MCNC: 120 MG/DL (ref 70–99)
HCT VFR BLD CALC: 42.5 % (ref 41–53)
HEMOGLOBIN: 14.6 G/DL (ref 13.5–17.5)
IMMATURE GRANULOCYTES: ABNORMAL %
LYMPHOCYTES # BLD: 14 % (ref 15–43)
MCH RBC QN AUTO: 30.8 PG (ref 26–34)
MCHC RBC AUTO-ENTMCNC: 34.5 G/DL (ref 31–37)
MCV RBC AUTO: 89.5 FL (ref 80–100)
MONOCYTES # BLD: 4 % (ref 6–14)
NRBC AUTOMATED: ABNORMAL PER 100 WBC
PDW BLD-RTO: 13.9 % (ref 11–14.5)
PLATELET # BLD: 431 K/UL (ref 140–450)
PLATELET ESTIMATE: ABNORMAL
PMV BLD AUTO: 7.7 FL (ref 6–12)
POTASSIUM SERPL-SCNC: 3.6 MMOL/L (ref 3.7–5.3)
RBC # BLD: 4.75 M/UL (ref 4.5–5.9)
RBC # BLD: ABNORMAL 10*6/UL
SALICYLATE LEVEL: <1 MG/DL (ref 3–10)
SEG NEUTROPHILS: 82 % (ref 44–74)
SEGMENTED NEUTROPHILS ABSOLUTE COUNT: 13.5 K/UL (ref 1.8–7.7)
SODIUM BLD-SCNC: 140 MMOL/L (ref 135–144)
TOXIC TRICYCLIC SC,BLOOD: NEGATIVE
TSH SERPL DL<=0.05 MIU/L-ACNC: 0.34 MIU/L (ref 0.3–5)
WBC # BLD: 16.5 K/UL (ref 3.5–11)
WBC # BLD: ABNORMAL 10*3/UL

## 2018-09-18 PROCEDURE — 6360000002 HC RX W HCPCS: Performed by: EMERGENCY MEDICINE

## 2018-09-18 PROCEDURE — 96372 THER/PROPH/DIAG INJ SC/IM: CPT

## 2018-09-18 PROCEDURE — 84443 ASSAY THYROID STIM HORMONE: CPT

## 2018-09-18 PROCEDURE — 80048 BASIC METABOLIC PNL TOTAL CA: CPT

## 2018-09-18 PROCEDURE — 80307 DRUG TEST PRSMV CHEM ANLYZR: CPT

## 2018-09-18 PROCEDURE — 36415 COLL VENOUS BLD VENIPUNCTURE: CPT

## 2018-09-18 PROCEDURE — G0480 DRUG TEST DEF 1-7 CLASSES: HCPCS

## 2018-09-18 PROCEDURE — 93005 ELECTROCARDIOGRAM TRACING: CPT

## 2018-09-18 PROCEDURE — 6360000002 HC RX W HCPCS

## 2018-09-18 PROCEDURE — 99284 EMERGENCY DEPT VISIT MOD MDM: CPT

## 2018-09-18 PROCEDURE — 85025 COMPLETE CBC W/AUTO DIFF WBC: CPT

## 2018-09-18 RX ORDER — LORAZEPAM 2 MG/ML
1 INJECTION INTRAMUSCULAR ONCE
Status: DISCONTINUED | OUTPATIENT
Start: 2018-09-18 | End: 2018-09-18 | Stop reason: HOSPADM

## 2018-09-18 RX ORDER — LORAZEPAM 1 MG/1
1 TABLET ORAL EVERY 8 HOURS PRN
Qty: 4 TABLET | Refills: 0 | Status: SHIPPED | OUTPATIENT
Start: 2018-09-18 | End: 2018-09-24

## 2018-09-18 RX ORDER — LORAZEPAM 2 MG/ML
1 INJECTION INTRAMUSCULAR ONCE
Status: COMPLETED | OUTPATIENT
Start: 2018-09-18 | End: 2018-09-18

## 2018-09-18 RX ORDER — LORAZEPAM 2 MG/ML
INJECTION INTRAMUSCULAR
Status: COMPLETED
Start: 2018-09-18 | End: 2018-09-18

## 2018-09-18 RX ORDER — ONDANSETRON 4 MG/1
4 TABLET, ORALLY DISINTEGRATING ORAL ONCE
Status: COMPLETED | OUTPATIENT
Start: 2018-09-18 | End: 2018-09-18

## 2018-09-18 RX ADMIN — LORAZEPAM 1 MG: 2 INJECTION INTRAMUSCULAR; INTRAVENOUS at 13:36

## 2018-09-18 RX ADMIN — ONDANSETRON 4 MG: 4 TABLET, ORALLY DISINTEGRATING ORAL at 11:10

## 2018-09-18 RX ADMIN — LORAZEPAM 1 MG: 2 INJECTION INTRAMUSCULAR; INTRAVENOUS at 11:06

## 2018-09-18 ASSESSMENT — ENCOUNTER SYMPTOMS
VOMITING: 1
BACK PAIN: 0
CONSTIPATION: 0
BLOOD IN STOOL: 0
ABDOMINAL PAIN: 0
SHORTNESS OF BREATH: 0
DIARRHEA: 0
TROUBLE SWALLOWING: 0
SORE THROAT: 0
NAUSEA: 1
WHEEZING: 0

## 2018-09-18 ASSESSMENT — PAIN SCALES - GENERAL: PAINLEVEL_OUTOF10: 7

## 2018-09-18 ASSESSMENT — PAIN DESCRIPTION - LOCATION: LOCATION: ABDOMEN;CHEST

## 2018-09-18 NOTE — ED PROVIDER NOTES
[facility]  eMERGENCY dEPARTMENT eNCOUnter      Pt Name: Pawan Taylor  MRN: [id number]  [place] 1990  Date of evaluation: 9/18/2018      CHIEF COMPLAINT       Chief Complaint   Patient presents with   [address] Pineda is a 33 y.o. male who presents With chief complaint of a panic attack comes in by squad this is his typical panic attack he had a nightmare last night that set it off it causes chest tightness and nausea vomiting he says in the past Ativan has helped he's having these more frequently. There's been no fevers no chills. No homicidal or suicidal ideation discharge repacked and nervous. Patient states he isn't obtuse physician about getting on different medications for his anxiety  He is currently on trazodone and Zoloft  REVIEW OF SYSTEMS         Review of Systems   Constitutional: Negative for chills and fever. HENT: Negative for congestion, dental problem, sore throat and trouble swallowing. Eyes: Negative for visual disturbance. Respiratory: Negative for shortness of breath and wheezing. Cardiovascular: Positive for chest pain. Negative for palpitations and leg swelling. Gastrointestinal: Positive for nausea and vomiting. Negative for abdominal pain, blood in stool, constipation and diarrhea. Genitourinary: Negative for difficulty urinating, dysuria and testicular pain. Musculoskeletal: Negative for back pain, joint swelling and neck pain. Skin: Negative for rash. Neurological: Negative for dizziness, syncope, weakness and headaches. Hematological: Negative for adenopathy. Does not bruise/bleed easily. Psychiatric/Behavioral: Negative for confusion and suicidal ideas. The patient is nervous/anxious. PAST MEDICAL HISTORY    has a past medical history of Acid reflux; Anxiety; Appendicitis; Irritable bowel syndrome; and Ulcer.     SURGICAL HISTORY      has a past surgical history that includes Appendectomy; Upper gastrointestinal endoscopy; Colonoscopy; and [place] tooth extraction. CURRENT MEDICATIONS       Previous Medications    DICYCLOMINE (BENTYL) 10 MG CAPSULE    Take 1 capsule by mouth 4 times daily    PANTOPRAZOLE (PROTONIX) 20 MG TABLET    Take 1 tablet by mouth 2 times daily    PROMETHAZINE (PHENERGAN) 25 MG TABLET    Take 1 tablet by mouth every 6 hours as needed for Nausea    SERTRALINE (ZOLOFT) 50 MG TABLET    Take 1 tablet by mouth daily    SUCRALFATE (CARAFATE) 1 GM TABLET    Take 1 tablet by mouth 4 times daily    TRAZODONE (DESYREL) 50 MG TABLET    Take 1 tablet by mouth nightly       ALLERGIES     has No Known Allergies. FAMILY HISTORY     indicated that his mother is alive. He indicated that his father is . He indicated that his sister is alive. He indicated that both of his brothers are alive. family history includes Allergies in his brother; Cancer in his maternal uncle; Crohn's Disease in his maternal uncle; Kidney Disease in his mother; Other in his father and mother. SOCIAL HISTORY      reports that he has quit smoking. His smoking use included Cigarettes. He smoked 0.50 packs per day. He has never used smokeless tobacco. He reports that he drinks about 1.2 oz of alcohol per week . He reports that he uses drugs, including Marijuana, about 6 times per week. PHYSICAL EXAM     INITIAL VITALS:  weight is 174 lb (78.9 kg). His tympanic temperature is 98.3 °F (36.8 °C). His blood pressure is 130/83 and his pulse is 80. His respiration is 14 and oxygen saturation is 95%. Anxious appearing  male in no acute distress  Physical Exam   Constitutional: He is oriented to person, place, and time. He appears well-developed and well-nourished. He appears distressed. HENT:   Head: Normocephalic and atraumatic. Mouth/Throat: Oropharynx is clear and moist.   Eyes: Pupils are equal, round, and reactive to light. EOM are normal.   Neck: Normal range of motion. Neck supple. SpO2: 98% 98% 98% 95%   Weight: 174 lb (78.9 kg)        -------------------------  BP: 130/83, Temp: 98.3 °F (36.8 °C), Pulse: 80, Resp: 14        Re-evaluation Notes     patient feeling much better pack attack is gone    CRITICAL CARE:   None        CONSULTS:  Patient was seen by the  from the [facility], she is given arrange outpatient follow-up is not suicidal or homicidal    PROCEDURES:  None    FINAL IMPRESSION      1. Panic attack          DISPOSITION/PLAN   DISPOSITION Discharged    Condition on Disposition    Stable    PATIENT REFERRED TO:  Purnima Koenig MD  [address] [place] [address]  [phone]    Schedule an appointment as soon as possible for a visit in 3 days        DISCHARGE MEDICATIONS:  New Prescriptions    LORAZEPAM (ATIVAN) 1 MG TABLET    Take 1 tablet by mouth every 8 hours as needed for Anxiety for up to 30 days. .       (Please note that portions of this note were completed with a voice recognition program.  Efforts were made to edit the dictations but occasionally words are mis-transcribed.)    Stover MD, F.A.A.E.M.   Attending Emergency Physician                            Yuval Amato MD  09/18/18 7853       Yuval Amato MD  09/18/18 9483

## 2018-09-21 ENCOUNTER — TELEPHONE (OUTPATIENT)
Dept: FAMILY MEDICINE CLINIC | Age: 28
End: 2018-09-21

## 2018-09-21 RX ORDER — HYDROXYZINE PAMOATE 25 MG/1
25 CAPSULE ORAL 3 TIMES DAILY PRN
Qty: 90 CAPSULE | Refills: 3 | Status: SHIPPED | OUTPATIENT
Start: 2018-09-21 | End: 2018-09-29

## 2018-09-21 NOTE — TELEPHONE ENCOUNTER
Mom calling stating pt had a panic attack and had to be taken by squad to the ER, pt is coming in Monday for a follow up, but mom questions what to do over the weekend if pt has another panic attack, please call above number which is the daughter's and she will contact mom to give us a call back.

## 2018-09-21 NOTE — TELEPHONE ENCOUNTER
Have him increase his zoloft to 100mg nightly (take 2 at a time for now). I will send in vistaril for him as well to take when he gets panicky.

## 2018-09-24 ENCOUNTER — OFFICE VISIT (OUTPATIENT)
Dept: FAMILY MEDICINE CLINIC | Age: 28
End: 2018-09-24
Payer: MEDICARE

## 2018-09-24 VITALS
SYSTOLIC BLOOD PRESSURE: 110 MMHG | BODY MASS INDEX: 26.42 KG/M2 | OXYGEN SATURATION: 98 % | TEMPERATURE: 98.5 F | DIASTOLIC BLOOD PRESSURE: 60 MMHG | HEART RATE: 83 BPM | HEIGHT: 69 IN | WEIGHT: 178.4 LBS

## 2018-09-24 DIAGNOSIS — K58.2 IRRITABLE BOWEL SYNDROME WITH BOTH CONSTIPATION AND DIARRHEA: ICD-10-CM

## 2018-09-24 DIAGNOSIS — F41.1 GAD (GENERALIZED ANXIETY DISORDER): Primary | ICD-10-CM

## 2018-09-24 PROCEDURE — G8427 DOCREV CUR MEDS BY ELIG CLIN: HCPCS | Performed by: FAMILY MEDICINE

## 2018-09-24 PROCEDURE — 1036F TOBACCO NON-USER: CPT | Performed by: FAMILY MEDICINE

## 2018-09-24 PROCEDURE — G8419 CALC BMI OUT NRM PARAM NOF/U: HCPCS | Performed by: FAMILY MEDICINE

## 2018-09-24 PROCEDURE — 99214 OFFICE O/P EST MOD 30 MIN: CPT | Performed by: FAMILY MEDICINE

## 2018-09-24 RX ORDER — BUSPIRONE HYDROCHLORIDE 10 MG/1
10 TABLET ORAL 2 TIMES DAILY
Qty: 60 TABLET | Refills: 5 | Status: SHIPPED | OUTPATIENT
Start: 2018-09-24 | End: 2018-10-24

## 2018-09-24 RX ORDER — CITALOPRAM 10 MG/1
10 TABLET ORAL DAILY
Qty: 30 TABLET | Refills: 1 | Status: ON HOLD | OUTPATIENT
Start: 2018-09-24 | End: 2018-11-01

## 2018-09-24 RX ORDER — DICYCLOMINE HYDROCHLORIDE 10 MG/1
10 CAPSULE ORAL 4 TIMES DAILY
Qty: 120 CAPSULE | Refills: 3 | Status: SHIPPED | OUTPATIENT
Start: 2018-09-24 | End: 2019-04-03 | Stop reason: SDUPTHER

## 2018-09-24 RX ORDER — BUSPIRONE HYDROCHLORIDE 5 MG/1
5 TABLET ORAL 2 TIMES DAILY
COMMUNITY
Start: 2018-08-27 | End: 2018-09-24 | Stop reason: SDUPTHER

## 2018-09-24 RX ORDER — TRAZODONE HYDROCHLORIDE 50 MG/1
50-100 TABLET ORAL NIGHTLY
Qty: 60 TABLET | Refills: 5 | Status: ON HOLD | OUTPATIENT
Start: 2018-09-24 | End: 2018-11-06 | Stop reason: HOSPADM

## 2018-09-24 RX ORDER — PANTOPRAZOLE SODIUM 20 MG/1
20 TABLET, DELAYED RELEASE ORAL 2 TIMES DAILY
Qty: 60 TABLET | Refills: 5 | Status: SHIPPED | OUTPATIENT
Start: 2018-09-24 | End: 2018-11-13 | Stop reason: SDUPTHER

## 2018-09-24 ASSESSMENT — ENCOUNTER SYMPTOMS
WHEEZING: 0
SHORTNESS OF BREATH: 0
CHEST TIGHTNESS: 0
NAUSEA: 0
CONSTIPATION: 0
COUGH: 0
DIARRHEA: 0
ABDOMINAL PAIN: 0

## 2018-09-24 NOTE — PROGRESS NOTES
[address] [facility]  Dept: [phone]  Dept Fax: [phone]  Loc: [phone]    Kem Amezquita is a 33 y.o. male who presents today for his medical conditions/complaints as noted below. Kem Amezquita is c/o of   Chief Complaint   Patient presents with    Abdominal Pain     1 month - pt states going pretty good    Anxiety       HPI:     HPI Here today for his abdominal pain and his anxiety. Abdominal pain: improving; he is feeling much better with the bentyl. He is not having any issues with cramping and his bowel movements are more normal. He is waking up with nausea because he is out of his PPI. He is not having any issues with constipation or blood in the stool. Anxiety: stable; he is falling asleep well when he takes the trazodone. He is only sleeping for about 4 hours though and then he wakes up worrying. Once he wakes up he is struggling to fall back asleep which then makes his anxiety worse throughout the day. He was having issues with zoloft giving him nightmares so he stopped taking it. He has been having issues with severe panic attacks. His mom has had to call EMS twice because of his panic attacks. He really likes the ativan that he has gotten in the ER because it makes him feel much better very quickly.        Past Medical History:   Diagnosis Date    Acid reflux     Anxiety     Appendicitis     when 11years old    Irritable bowel syndrome     Ulcer           Social History   Substance Use Topics    Smoking status: Former Smoker     Packs/day: 0.50     Types: Cigarettes    Smokeless tobacco: Never Used    Alcohol use 1.2 oz/week     2 Cans of beer per week      Comment: stopped drinking 7 months ago - Jan 2018      Current Outpatient Prescriptions   Medication Sig Dispense Refill    traZODone (DESYREL) 50 MG tablet Take 1-2 tablets by mouth nightly 60 tablet 5    citalopram (CELEXA) 10 MG tablet Take 1 tablet by mouth daily 30 tablet 1    pantoprazole (PROTONIX) 20 MG tablet Take 1 tablet by mouth 2 times daily 60 tablet 5    dicyclomine (BENTYL) 10 MG capsule Take 1 capsule by mouth 4 times daily 120 capsule 3    busPIRone (BUSPAR) 10 MG tablet Take 1 tablet by mouth 2 times daily 60 tablet 5    hydrOXYzine (VISTARIL) 25 MG capsule Take 1 capsule by mouth 3 times daily as needed for Anxiety 90 capsule 3    sucralfate (CARAFATE) 1 GM tablet Take 1 tablet by mouth 4 times daily 120 tablet 5    promethazine (PHENERGAN) 25 MG tablet Take 1 tablet by mouth every 6 hours as needed for Nausea 30 tablet 2     No current facility-administered medications for this visit. Allergies   Allergen Reactions    Zoloft [Sertraline Hcl] Other (See Comments)     nightmares       Subjective:      Review of Systems   Constitutional: Negative for activity change, appetite change, chills, fatigue and fever. Eyes: Negative for visual disturbance. Respiratory: Negative for cough, chest tightness, shortness of breath and wheezing. Cardiovascular: Negative for chest pain, palpitations and leg swelling. Gastrointestinal: Negative for abdominal pain, constipation, diarrhea and nausea. Genitourinary: Negative for difficulty urinating. Skin: Negative for rash. Neurological: Positive for headaches. Negative for dizziness, syncope, weakness and light-headedness. Psychiatric/Behavioral: Positive for decreased concentration and sleep disturbance. The patient is nervous/anxious. Objective:     Physical Exam   Constitutional: He is oriented to person, place, and time. He appears well-developed and well-nourished. No distress. Eyes: Pupils are equal, round, and reactive to light. Conjunctivae and EOM are normal.   Neck: Normal range of motion. Neck supple. Cardiovascular: Normal rate, regular rhythm, normal heart sounds and intact distal pulses. No murmur heard.   Pulmonary/Chest: Effort normal and breath sounds

## 2018-09-27 ENCOUNTER — HOSPITAL ENCOUNTER (EMERGENCY)
Age: 28
Discharge: HOME | End: 2018-09-27
Attending: EMERGENCY MEDICINE
Payer: MEDICARE

## 2018-09-27 VITALS
RESPIRATION RATE: 12 BRPM | SYSTOLIC BLOOD PRESSURE: 147 MMHG | DIASTOLIC BLOOD PRESSURE: 77 MMHG | TEMPERATURE: 97.7 F | HEART RATE: 62 BPM | OXYGEN SATURATION: 99 %

## 2018-09-27 DIAGNOSIS — F41.1 ANXIETY STATE: Primary | ICD-10-CM

## 2018-09-27 DIAGNOSIS — R11.0 NAUSEA: ICD-10-CM

## 2018-09-27 LAB
ABSOLUTE EOS #: 0 K/UL (ref 0–0.4)
ABSOLUTE IMMATURE GRANULOCYTE: ABNORMAL K/UL (ref 0–0.3)
ABSOLUTE LYMPH #: 1.9 K/UL (ref 1–4.8)
ABSOLUTE MONO #: 0.9 K/UL (ref 0.1–1.2)
ALBUMIN SERPL-MCNC: 4.7 G/DL (ref 3.5–5.2)
ALBUMIN/GLOBULIN RATIO: 1.8 (ref 1–2.5)
ALP BLD-CCNC: 79 U/L (ref 40–129)
ALT SERPL-CCNC: 16 U/L (ref 5–41)
ANION GAP SERPL CALCULATED.3IONS-SCNC: 17 MMOL/L (ref 9–17)
AST SERPL-CCNC: 11 U/L
BASOPHILS # BLD: 0 % (ref 0–2)
BASOPHILS ABSOLUTE: 0.1 K/UL (ref 0–0.2)
BILIRUB SERPL-MCNC: 0.62 MG/DL (ref 0.3–1.2)
BUN BLDV-MCNC: 10 MG/DL (ref 6–20)
BUN/CREAT BLD: 12 (ref 9–20)
CALCIUM SERPL-MCNC: 9.9 MG/DL (ref 8.6–10.4)
CHLORIDE BLD-SCNC: 100 MMOL/L (ref 98–107)
CO2: 18 MMOL/L (ref 20–31)
CREAT SERPL-MCNC: 0.86 MG/DL (ref 0.7–1.2)
DIFFERENTIAL TYPE: ABNORMAL
EKG ATRIAL RATE: 66 BPM
EKG P AXIS: 55 DEGREES
EKG P-R INTERVAL: 146 MS
EKG Q-T INTERVAL: 412 MS
EKG QRS DURATION: 90 MS
EKG QTC CALCULATION (BAZETT): 431 MS
EKG R AXIS: 53 DEGREES
EKG T AXIS: 48 DEGREES
EKG VENTRICULAR RATE: 66 BPM
EOSINOPHILS RELATIVE PERCENT: 0 % (ref 1–8)
GFR AFRICAN AMERICAN: >60 ML/MIN
GFR NON-AFRICAN AMERICAN: >60 ML/MIN
GFR SERPL CREATININE-BSD FRML MDRD: ABNORMAL ML/MIN/{1.73_M2}
GFR SERPL CREATININE-BSD FRML MDRD: ABNORMAL ML/MIN/{1.73_M2}
GLUCOSE BLD-MCNC: 138 MG/DL (ref 70–99)
HCT VFR BLD CALC: 44 % (ref 41–53)
HEMOGLOBIN: 15.2 G/DL (ref 13.5–17.5)
IMMATURE GRANULOCYTES: ABNORMAL %
LYMPHOCYTES # BLD: 11 % (ref 15–43)
MCH RBC QN AUTO: 30.7 PG (ref 26–34)
MCHC RBC AUTO-ENTMCNC: 34.5 G/DL (ref 31–37)
MCV RBC AUTO: 89.3 FL (ref 80–100)
MONOCYTES # BLD: 5 % (ref 6–14)
NRBC AUTOMATED: ABNORMAL PER 100 WBC
PDW BLD-RTO: 13.9 % (ref 11–14.5)
PLATELET # BLD: 431 K/UL (ref 140–450)
PLATELET ESTIMATE: ABNORMAL
PMV BLD AUTO: 7.6 FL (ref 6–12)
POTASSIUM SERPL-SCNC: 3.3 MMOL/L (ref 3.7–5.3)
RBC # BLD: 4.93 M/UL (ref 4.5–5.9)
RBC # BLD: ABNORMAL 10*6/UL
SEG NEUTROPHILS: 84 % (ref 44–74)
SEGMENTED NEUTROPHILS ABSOLUTE COUNT: 15.1 K/UL (ref 1.8–7.7)
SODIUM BLD-SCNC: 135 MMOL/L (ref 135–144)
TOTAL PROTEIN: 7.3 G/DL (ref 6.4–8.3)
TROPONIN INTERP: NORMAL
TROPONIN T: <0.03 NG/ML
WBC # BLD: 17.9 K/UL (ref 3.5–11)
WBC # BLD: ABNORMAL 10*3/UL

## 2018-09-27 PROCEDURE — 96374 THER/PROPH/DIAG INJ IV PUSH: CPT

## 2018-09-27 PROCEDURE — 96372 THER/PROPH/DIAG INJ SC/IM: CPT

## 2018-09-27 PROCEDURE — 93005 ELECTROCARDIOGRAM TRACING: CPT

## 2018-09-27 PROCEDURE — 80053 COMPREHEN METABOLIC PANEL: CPT

## 2018-09-27 PROCEDURE — 99284 EMERGENCY DEPT VISIT MOD MDM: CPT

## 2018-09-27 PROCEDURE — 85025 COMPLETE CBC W/AUTO DIFF WBC: CPT

## 2018-09-27 PROCEDURE — 2580000003 HC RX 258: Performed by: EMERGENCY MEDICINE

## 2018-09-27 PROCEDURE — 84484 ASSAY OF TROPONIN QUANT: CPT

## 2018-09-27 PROCEDURE — 36415 COLL VENOUS BLD VENIPUNCTURE: CPT

## 2018-09-27 PROCEDURE — 6360000002 HC RX W HCPCS: Performed by: EMERGENCY MEDICINE

## 2018-09-27 RX ORDER — PROMETHAZINE HYDROCHLORIDE 25 MG/ML
12.5 INJECTION, SOLUTION INTRAMUSCULAR; INTRAVENOUS ONCE
Status: COMPLETED | OUTPATIENT
Start: 2018-09-27 | End: 2018-09-27

## 2018-09-27 RX ORDER — 0.9 % SODIUM CHLORIDE 0.9 %
1000 INTRAVENOUS SOLUTION INTRAVENOUS ONCE
Status: COMPLETED | OUTPATIENT
Start: 2018-09-27 | End: 2018-09-27

## 2018-09-27 RX ORDER — LORAZEPAM 2 MG/ML
0.5 INJECTION INTRAMUSCULAR ONCE
Status: COMPLETED | OUTPATIENT
Start: 2018-09-27 | End: 2018-09-27

## 2018-09-27 RX ADMIN — SODIUM CHLORIDE 1000 ML: 9 INJECTION, SOLUTION INTRAVENOUS at 10:38

## 2018-09-27 RX ADMIN — PROMETHAZINE HYDROCHLORIDE 12.5 MG: 25 INJECTION, SOLUTION INTRAMUSCULAR; INTRAVENOUS at 12:52

## 2018-09-27 RX ADMIN — LORAZEPAM 0.5 MG: 2 INJECTION INTRAMUSCULAR; INTRAVENOUS at 12:52

## 2018-09-27 RX ADMIN — PROMETHAZINE HYDROCHLORIDE 12.5 MG: 25 INJECTION, SOLUTION INTRAMUSCULAR; INTRAVENOUS at 10:38

## 2018-09-27 ASSESSMENT — PAIN SCALES - GENERAL: PAINLEVEL_OUTOF10: 9

## 2018-09-27 ASSESSMENT — PAIN DESCRIPTION - DESCRIPTORS: DESCRIPTORS: CRAMPING

## 2018-09-27 ASSESSMENT — PAIN DESCRIPTION - PAIN TYPE: TYPE: ACUTE PAIN

## 2018-09-27 ASSESSMENT — PAIN DESCRIPTION - LOCATION: LOCATION: ABDOMEN

## 2018-09-27 NOTE — ED TRIAGE NOTES
Pt to room #3 via stretcher with c/o anxiety attack and abd pain, which he contributes to his anxiety.

## 2018-09-27 NOTE — ED PROVIDER NOTES
[place] tooth extraction. CURRENT MEDICATIONS       Discharge Medication List as of 2018 11:40 AM      CONTINUE these medications which have NOT CHANGED    Details   traZODone (DESYREL) 50 MG tablet Take 1-2 tablets by mouth nightly, Disp-60 tablet, R-5Normal      citalopram (CELEXA) 10 MG tablet Take 1 tablet by mouth daily, Disp-30 tablet, R-1Normal      pantoprazole (PROTONIX) 20 MG tablet Take 1 tablet by mouth 2 times daily, Disp-60 tablet, R-5Normal      dicyclomine (BENTYL) 10 MG capsule Take 1 capsule by mouth 4 times daily, Disp-120 capsule, R-3Normal      busPIRone (BUSPAR) 10 MG tablet Take 1 tablet by mouth 2 times daily, Disp-60 tablet, R-5Normal      hydrOXYzine (VISTARIL) 25 MG capsule Take 1 capsule by mouth 3 times daily as needed for Anxiety, Disp-90 capsule, R-3Normal      sucralfate (CARAFATE) 1 GM tablet Take 1 tablet by mouth 4 times daily, Disp-120 tablet, R-5Normal      promethazine (PHENERGAN) 25 MG tablet Take 1 tablet by mouth every 6 hours as needed for Nausea, Disp-30 tablet, R-2Normal             ALLERGIES     is allergic to zoloft [sertraline hcl]. FAMILY HISTORY     indicated that his mother is alive. He indicated that his father is . He indicated that his sister is alive. He indicated that both of his brothers are alive. family history includes Allergies in his brother; Cancer in his maternal uncle; Crohn's Disease in his maternal uncle; Kidney Disease in his mother; Other in his father and mother. SOCIAL HISTORY      reports that he has quit smoking. His smoking use included Cigarettes. He smoked 0.50 packs per day. He has never used smokeless tobacco. He reports that he drinks about 1.2 oz of alcohol per week . He reports that he uses drugs, including Marijuana, about 6 times per week. PHYSICAL EXAM     INITIAL VITALS:  tympanic temperature is 97.7 °F (36.5 °C). His blood pressure is 147/77 (abnormal) and his pulse is 62.  His respiration is 12 and oxygen saturation is 99%. Patient is alert and oriented, mildly to moderately anxious. HEENT is atraumatic. Pupils are PERRL at 4 mm. No nystagmus. Mucous membranes moist.    Neck is supple with no lymphadenopathy. No JVD. No meningismus. Heart sounds regular rate and rhythm with no gallops, murmurs, or rubs. Lungs clear, no wheezes, rales or rhonchi. Abdomen: soft, with diffuse tenderness in all quadrants. Normal bowel sounds are noted. No rebound or guarding. Musculoskeletal exam shows no evidence of trauma. Skin: no rash or edema. Neurological exam reveals cranial nerves 2 through 12 grossly intact. Patient has equal  and normal deep tendon reflexes. Psychiatric: no hallucinations or suicidal ideation. Anxious. Lymphatics.:  No lymphadenopathy. DIFFERENTIAL DIAGNOSIS/ MDM:     Anxiety,  Nausea, AMI, ACS    DIAGNOSTIC RESULTS     EKG: All EKG's are interpreted by the Emergency Department Physician who either signs or Co-signs this chart in the absence of a cardiologist.    Sinus 66 with no ischemia. No Brugada. No delta wave. Axis 53, , QRS 90, .         LABS:  Results for orders placed or performed during the hospital encounter of 09/27/18   Troponin   Result Value Ref Range    Troponin T <0.03 <0.03 ng/mL    Troponin Interp         CBC Auto Differential   Result Value Ref Range    WBC 17.9 (H) 3.5 - 11.0 k/uL    RBC 4.93 4.5 - 5.9 m/uL    Hemoglobin 15.2 13.5 - 17.5 g/dL    Hematocrit 44.0 41 - 53 %    MCV 89.3 80 - 100 fL    MCH 30.7 26 - 34 pg    MCHC 34.5 31 - 37 g/dL    RDW 13.9 11.0 - 14.5 %    Platelets 616 923 - 673 k/uL    MPV 7.6 6.0 - 12.0 fL    NRBC Automated NOT REPORTED per 100 WBC    Differential Type NOT REPORTED     Immature Granulocytes NOT REPORTED 0 %    Absolute Immature Granulocyte NOT REPORTED 0.00 - 0.30 k/uL    WBC Morphology NOT REPORTED     RBC Morphology NOT REPORTED     Platelet Estimate NOT REPORTED     Seg Neutrophils 84 (H) 44 - 74 %    Lymphocytes 11 (L) 15 - 43 %    Monocytes 5 (L) 6 - 14 %    Eosinophils % 0 (L) 1 - 8 %    Basophils 0 0 - 2 %    Segs Absolute 15.10 (H) 1.8 - 7.7 k/uL    Absolute Lymph # 1.90 1.0 - 4.8 k/uL    Absolute Mono # 0.90 0.1 - 1.2 k/uL    Absolute Eos # 0.00 0.0 - 0.4 k/uL    Basophils # 0.10 0.0 - 0.2 k/uL   Comprehensive Metabolic Panel   Result Value Ref Range    Glucose 138 (H) 70 - 99 mg/dL    BUN 10 6 - 20 mg/dL    CREATININE 0.86 0.70 - 1.20 mg/dL    Bun/Cre Ratio 12 9 - 20    Calcium 9.9 8.6 - 10.4 mg/dL    Sodium 135 135 - 144 mmol/L    Potassium 3.3 (L) 3.7 - 5.3 mmol/L    Chloride 100 98 - 107 mmol/L    CO2 18 (L) 20 - 31 mmol/L    Anion Gap 17 9 - 17 mmol/L    Alkaline Phosphatase 79 40 - 129 U/L    ALT 16 5 - 41 U/L    AST 11 <40 U/L    Total Bilirubin 0.62 0.3 - 1.2 mg/dL    Total Protein 7.3 6.4 - 8.3 g/dL    Alb 4.7 3.5 - 5.2 g/dL    Albumin/Globulin Ratio 1.8 1.0 - 2.5    GFR Non-African American >60 >60 mL/min    GFR African American >60 >60 mL/min    GFR Comment          GFR Staging NOT REPORTED    EKG 12 Lead   Result Value Ref Range    Ventricular Rate 66 BPM    Atrial Rate 66 BPM    P-R Interval 146 ms    QRS Duration 90 ms    Q-T Interval 412 ms    QTc Calculation (Bazett) 431 ms    P Axis 55 degrees    R Axis 53 degrees    T Axis 48 degrees         EMERGENCY DEPARTMENT COURSE:   Vitals:    Vitals:    09/27/18 1003 09/27/18 1154 09/27/18 1312   BP: (!) 140/69 136/78 (!) 147/77   Pulse: 64 62 62   Resp: 24 12 12   Temp: 97.7 °F (36.5 °C)     TempSrc: Tympanic     SpO2: 99%       -------------------------  BP: (!) 147/77, Temp: 97.7 °F (36.5 °C), Pulse: 62, Resp: 12      Re-evaluation Notes    1104  Patient reports improvement after Phenergan. He says he can call someone to pick him up. He is planning to do laundry at home. 1247  Phenergan and Ativan given.   Pt has appts with [facility] and is reaching out for a counseling appt, with help of his mother. The patient received Phenergan. I did not feel a need to give him anxiolytics. He should talk to his PCP about his anxiety medication. I seee no indication of suicidal ideation. He is discharged in good condition. FINAL IMPRESSION      1. Anxiety state    2.  Nausea          DISPOSITION/PLAN   DISPOSITION        Condition on Disposition    good    PATIENT REFERRED TO:  Jeff Euceda MD  [address] [place] [address]  [phone]    In 2 days        DISCHARGE MEDICATIONS:  Discharge Medication List as of 9/27/2018 11:40 AM          (Please note that portions of this note were completed with a voice recognition program.  Efforts were made to edit the dictations but occasionally words are mis-transcribed.)    Fisher MD   Attending Emergency Physician         Ulices Yap MD  09/27/18 1141       Ulices Yap MD  09/27/18 8452

## 2018-09-27 NOTE — ED NOTES
Mother of pt arrives to ED and is concerned about pt and taking him home as he does not have a plan. Mother denies scheduling an appointment for him as the pt told me she had. Writer gave pt portable phone to call and set up a plan and appointment with [facility], pt becomes very anxious while on the phone and mother ends up speaking with employee of [facility] to discuss plan. Pt ask for a shower and writer walks pt to ER bathroom with shower and provides towels and privacy. Pt states sometimes a shower is the only thing that calms him down.  Pt denies further needs and is instructed in use of call light in Kam Muniz, GARRETT  09/27/18 3452

## 2018-09-27 NOTE — ED NOTES
Pt returns to room following warm shower and appears less anxious, but has serious concerns about worsening panic attacks upon discharge. Dr. James Gonsales made aware of pt's concerns.       Cynthia Huerta RN  09/27/18 3511

## 2018-09-28 ENCOUNTER — TELEPHONE (OUTPATIENT)
Dept: FAMILY MEDICINE CLINIC | Age: 28
End: 2018-09-28

## 2018-09-28 ENCOUNTER — HOSPITAL ENCOUNTER (EMERGENCY)
Age: 28
Discharge: HOME | End: 2018-09-29
Attending: EMERGENCY MEDICINE
Payer: MEDICARE

## 2018-09-28 ENCOUNTER — APPOINTMENT (OUTPATIENT)
Dept: GENERAL RADIOLOGY | Age: 28
End: 2018-09-28
Payer: MEDICARE

## 2018-09-28 VITALS
HEART RATE: 87 BPM | DIASTOLIC BLOOD PRESSURE: 88 MMHG | BODY MASS INDEX: 25.77 KG/M2 | OXYGEN SATURATION: 100 % | RESPIRATION RATE: 20 BRPM | WEIGHT: 180 LBS | SYSTOLIC BLOOD PRESSURE: 163 MMHG | HEIGHT: 70 IN | TEMPERATURE: 100 F

## 2018-09-28 DIAGNOSIS — R11.2 NAUSEA AND VOMITING, INTRACTABILITY OF VOMITING NOT SPECIFIED, UNSPECIFIED VOMITING TYPE: ICD-10-CM

## 2018-09-28 DIAGNOSIS — F41.1 ANXIETY STATE: Primary | ICD-10-CM

## 2018-09-28 DIAGNOSIS — R10.10 PAIN OF UPPER ABDOMEN: ICD-10-CM

## 2018-09-28 LAB
ABSOLUTE EOS #: 0 K/UL (ref 0–0.4)
ABSOLUTE IMMATURE GRANULOCYTE: ABNORMAL K/UL (ref 0–0.3)
ABSOLUTE LYMPH #: 3.2 K/UL (ref 1–4.8)
ABSOLUTE MONO #: 1.3 K/UL (ref 0.1–1.2)
ALBUMIN SERPL-MCNC: 4.9 G/DL (ref 3.5–5.2)
ALBUMIN/GLOBULIN RATIO: 1.6 (ref 1–2.5)
ALP BLD-CCNC: 79 U/L (ref 40–129)
ALT SERPL-CCNC: 15 U/L (ref 5–41)
ANION GAP SERPL CALCULATED.3IONS-SCNC: 20 MMOL/L (ref 9–17)
AST SERPL-CCNC: 12 U/L
BASOPHILS # BLD: 0 % (ref 0–2)
BASOPHILS ABSOLUTE: 0.1 K/UL (ref 0–0.2)
BILIRUB SERPL-MCNC: 1.02 MG/DL (ref 0.3–1.2)
BUN BLDV-MCNC: 13 MG/DL (ref 6–20)
BUN/CREAT BLD: 13 (ref 9–20)
CALCIUM SERPL-MCNC: 10 MG/DL (ref 8.6–10.4)
CHLORIDE BLD-SCNC: 101 MMOL/L (ref 98–107)
CO2: 19 MMOL/L (ref 20–31)
CREAT SERPL-MCNC: 0.98 MG/DL (ref 0.7–1.2)
DIFFERENTIAL TYPE: ABNORMAL
EKG ATRIAL RATE: 75 BPM
EKG P AXIS: 67 DEGREES
EKG P-R INTERVAL: 140 MS
EKG Q-T INTERVAL: 382 MS
EKG QRS DURATION: 94 MS
EKG QTC CALCULATION (BAZETT): 426 MS
EKG R AXIS: 61 DEGREES
EKG T AXIS: 66 DEGREES
EKG VENTRICULAR RATE: 75 BPM
EOSINOPHILS RELATIVE PERCENT: 0 % (ref 1–8)
GFR AFRICAN AMERICAN: >60 ML/MIN
GFR NON-AFRICAN AMERICAN: >60 ML/MIN
GFR SERPL CREATININE-BSD FRML MDRD: ABNORMAL ML/MIN/{1.73_M2}
GFR SERPL CREATININE-BSD FRML MDRD: ABNORMAL ML/MIN/{1.73_M2}
GLUCOSE BLD-MCNC: 109 MG/DL (ref 70–99)
HCT VFR BLD CALC: 42.5 % (ref 41–53)
HEMOGLOBIN: 14.7 G/DL (ref 13.5–17.5)
IMMATURE GRANULOCYTES: ABNORMAL %
LIPASE: 16 U/L (ref 13–60)
LYMPHOCYTES # BLD: 19 % (ref 15–43)
MCH RBC QN AUTO: 31.1 PG (ref 26–34)
MCHC RBC AUTO-ENTMCNC: 34.5 G/DL (ref 31–37)
MCV RBC AUTO: 90.2 FL (ref 80–100)
MONOCYTES # BLD: 7 % (ref 6–14)
NRBC AUTOMATED: ABNORMAL PER 100 WBC
PDW BLD-RTO: 14.2 % (ref 11–14.5)
PLATELET # BLD: 412 K/UL (ref 140–450)
PLATELET ESTIMATE: ABNORMAL
PMV BLD AUTO: 7.5 FL (ref 6–12)
POTASSIUM SERPL-SCNC: 3.2 MMOL/L (ref 3.7–5.3)
RBC # BLD: 4.71 M/UL (ref 4.5–5.9)
RBC # BLD: ABNORMAL 10*6/UL
SEG NEUTROPHILS: 74 % (ref 44–74)
SEGMENTED NEUTROPHILS ABSOLUTE COUNT: 12.5 K/UL (ref 1.8–7.7)
SODIUM BLD-SCNC: 140 MMOL/L (ref 135–144)
TOTAL PROTEIN: 8 G/DL (ref 6.4–8.3)
TROPONIN INTERP: NORMAL
TROPONIN T: <0.03 NG/ML
WBC # BLD: 17.1 K/UL (ref 3.5–11)
WBC # BLD: ABNORMAL 10*3/UL

## 2018-09-28 PROCEDURE — 83690 ASSAY OF LIPASE: CPT

## 2018-09-28 PROCEDURE — 99283 EMERGENCY DEPT VISIT LOW MDM: CPT

## 2018-09-28 PROCEDURE — 93005 ELECTROCARDIOGRAM TRACING: CPT

## 2018-09-28 PROCEDURE — 36415 COLL VENOUS BLD VENIPUNCTURE: CPT

## 2018-09-28 PROCEDURE — 74022 RADEX COMPL AQT ABD SERIES: CPT

## 2018-09-28 PROCEDURE — 85025 COMPLETE CBC W/AUTO DIFF WBC: CPT

## 2018-09-28 PROCEDURE — 6360000002 HC RX W HCPCS: Performed by: EMERGENCY MEDICINE

## 2018-09-28 PROCEDURE — 96372 THER/PROPH/DIAG INJ SC/IM: CPT

## 2018-09-28 PROCEDURE — 84484 ASSAY OF TROPONIN QUANT: CPT

## 2018-09-28 PROCEDURE — 80053 COMPREHEN METABOLIC PANEL: CPT

## 2018-09-28 RX ORDER — LORAZEPAM 2 MG/ML
1 INJECTION INTRAMUSCULAR ONCE
Status: DISCONTINUED | OUTPATIENT
Start: 2018-09-28 | End: 2018-09-29 | Stop reason: HOSPADM

## 2018-09-28 RX ORDER — HYDROXYZINE HYDROCHLORIDE 50 MG/ML
100 INJECTION, SOLUTION INTRAMUSCULAR ONCE
Status: COMPLETED | OUTPATIENT
Start: 2018-09-28 | End: 2018-09-28

## 2018-09-28 RX ADMIN — HYDROXYZINE HYDROCHLORIDE 100 MG: 50 INJECTION, SOLUTION INTRAMUSCULAR at 21:58

## 2018-09-28 ASSESSMENT — PAIN SCALES - GENERAL: PAINLEVEL_OUTOF10: 8

## 2018-09-28 ASSESSMENT — PAIN DESCRIPTION - LOCATION: LOCATION: ABDOMEN

## 2018-09-28 ASSESSMENT — PAIN DESCRIPTION - PAIN TYPE: TYPE: ACUTE PAIN

## 2018-09-28 NOTE — TELEPHONE ENCOUNTER
Did he try taking the vistaril (hydroxazine) too? Because that one should calm him down during a panic attack. He can take 2 at a time if needed.

## 2018-09-28 NOTE — TELEPHONE ENCOUNTER
He definitely needs to see a psychiatrist (which is sounds like he is in the process of), but he may need to go to the [facility] this [facility].

## 2018-09-28 NOTE — TELEPHONE ENCOUNTER
Mother calling with pt at side stating that pt was seen in [facility] ER for an anxiety attack. Mother states that pt states that the buspar is not working. Please call pt and advise.

## 2018-09-28 NOTE — TELEPHONE ENCOUNTER
Oct 3rd an assesment at [address] let her know he can take 2 vistaril when he starting to have a panic attack- Mom sated she can't go out of his site- she had to leave him with his brother while she went to run errands - she said if I walk out of the room or out of his site he dolly -mom, she stated she is getting really bad

## 2018-09-28 NOTE — TELEPHONE ENCOUNTER
Spoke to Toshia Wolfe and let her know Dr Jennifer Casey feels if he is acting this way he, they made to take him to [facility] center and he would need to go to ER and tell them their concerns that they are afraid to leave him alone because they are afraid he will hurt himself and about his breakdowns he is having - but they can't sugar coat it they need to be blunt and honest with the ER DR's of everything that is going on, she said if he has another episode she will take him in and take him tell them everything that is and has been going on with him

## 2018-09-29 RX ORDER — HYDROXYZINE HYDROCHLORIDE 25 MG/1
25 TABLET, FILM COATED ORAL 3 TIMES DAILY PRN
Qty: 15 TABLET | Refills: 0 | Status: SHIPPED | OUTPATIENT
Start: 2018-09-29 | End: 2018-09-29

## 2018-09-29 RX ORDER — HYDROXYZINE PAMOATE 25 MG/1
25 CAPSULE ORAL 3 TIMES DAILY PRN
Qty: 15 CAPSULE | Refills: 0 | Status: SHIPPED | OUTPATIENT
Start: 2018-09-29 | End: 2018-10-13

## 2018-09-29 ASSESSMENT — ENCOUNTER SYMPTOMS
NAUSEA: 1
VOMITING: 1
SHORTNESS OF BREATH: 0
ABDOMINAL PAIN: 1

## 2018-11-01 ENCOUNTER — HOSPITAL ENCOUNTER (INPATIENT)
Age: 28
LOS: 6 days | Discharge: HOME | DRG: 751 | End: 2018-11-07
Attending: PSYCHIATRY & NEUROLOGY | Admitting: PSYCHIATRY & NEUROLOGY
Payer: MEDICARE

## 2018-11-01 PROBLEM — F33.9 MAJOR DEPRESSION, RECURRENT (HCC): Status: ACTIVE | Noted: 2018-11-01

## 2018-11-01 PROBLEM — F43.10 POST TRAUMATIC STRESS DISORDER: Status: ACTIVE | Noted: 2018-11-01

## 2018-11-01 PROCEDURE — 90792 PSYCH DIAG EVAL W/MED SRVCS: CPT | Performed by: REGISTERED NURSE

## 2018-11-01 PROCEDURE — 6370000000 HC RX 637 (ALT 250 FOR IP): Performed by: REGISTERED NURSE

## 2018-11-01 PROCEDURE — 1240000000 HC EMOTIONAL WELLNESS R&B

## 2018-11-01 RX ORDER — TRAZODONE HYDROCHLORIDE 50 MG/1
50 TABLET ORAL NIGHTLY PRN
Status: DISCONTINUED | OUTPATIENT
Start: 2018-11-01 | End: 2018-11-07 | Stop reason: HOSPADM

## 2018-11-01 RX ORDER — HALOPERIDOL 5 MG/ML
10 INJECTION INTRAMUSCULAR EVERY 6 HOURS PRN
Status: DISCONTINUED | OUTPATIENT
Start: 2018-11-01 | End: 2018-11-07 | Stop reason: HOSPADM

## 2018-11-01 RX ORDER — HYDROXYZINE 50 MG/1
50 TABLET, FILM COATED ORAL 3 TIMES DAILY PRN
Status: DISCONTINUED | OUTPATIENT
Start: 2018-11-01 | End: 2018-11-07 | Stop reason: HOSPADM

## 2018-11-01 RX ORDER — DICYCLOMINE HYDROCHLORIDE 10 MG/1
10 CAPSULE ORAL 4 TIMES DAILY
Status: DISCONTINUED | OUTPATIENT
Start: 2018-11-01 | End: 2018-11-04

## 2018-11-01 RX ORDER — PRAZOSIN HYDROCHLORIDE 1 MG/1
1 CAPSULE ORAL 2 TIMES DAILY
Status: ON HOLD | COMMUNITY
End: 2018-11-06 | Stop reason: HOSPADM

## 2018-11-01 RX ORDER — PANTOPRAZOLE SODIUM 20 MG/1
20 TABLET, DELAYED RELEASE ORAL 2 TIMES DAILY
Status: DISCONTINUED | OUTPATIENT
Start: 2018-11-01 | End: 2018-11-07 | Stop reason: HOSPADM

## 2018-11-01 RX ORDER — MAGNESIUM HYDROXIDE/ALUMINUM HYDROXICE/SIMETHICONE 120; 1200; 1200 MG/30ML; MG/30ML; MG/30ML
30 SUSPENSION ORAL EVERY 6 HOURS PRN
Status: DISCONTINUED | OUTPATIENT
Start: 2018-11-01 | End: 2018-11-07 | Stop reason: HOSPADM

## 2018-11-01 RX ORDER — MIRTAZAPINE 30 MG/1
30 TABLET, FILM COATED ORAL NIGHTLY
Status: ON HOLD | COMMUNITY
End: 2018-11-06 | Stop reason: HOSPADM

## 2018-11-01 RX ORDER — SUCRALFATE 1 G/1
1 TABLET ORAL 4 TIMES DAILY
Status: DISCONTINUED | OUTPATIENT
Start: 2018-11-01 | End: 2018-11-07 | Stop reason: HOSPADM

## 2018-11-01 RX ORDER — HYDROXYZINE PAMOATE 25 MG/1
50 CAPSULE ORAL 3 TIMES DAILY PRN
COMMUNITY
End: 2019-04-03

## 2018-11-01 RX ORDER — CLONAZEPAM 0.5 MG/1
1 TABLET ORAL NIGHTLY
COMMUNITY
Start: 2018-10-03 | End: 2019-07-02

## 2018-11-01 RX ORDER — BENZTROPINE MESYLATE 1 MG/ML
2 INJECTION INTRAMUSCULAR; INTRAVENOUS 2 TIMES DAILY PRN
Status: DISCONTINUED | OUTPATIENT
Start: 2018-11-01 | End: 2018-11-07 | Stop reason: HOSPADM

## 2018-11-01 RX ORDER — NICOTINE 21 MG/24HR
1 PATCH, TRANSDERMAL 24 HOURS TRANSDERMAL DAILY
Status: DISCONTINUED | OUTPATIENT
Start: 2018-11-01 | End: 2018-11-05

## 2018-11-01 RX ORDER — OLANZAPINE 5 MG/1
5 TABLET, ORALLY DISINTEGRATING ORAL NIGHTLY
Status: ON HOLD | COMMUNITY
End: 2018-11-06 | Stop reason: HOSPADM

## 2018-11-01 RX ORDER — MIRTAZAPINE 30 MG/1
30 TABLET, FILM COATED ORAL NIGHTLY
Status: DISCONTINUED | OUTPATIENT
Start: 2018-11-01 | End: 2018-11-07 | Stop reason: HOSPADM

## 2018-11-01 RX ORDER — PROMETHAZINE HYDROCHLORIDE 25 MG/1
25 TABLET ORAL EVERY 6 HOURS PRN
Status: DISCONTINUED | OUTPATIENT
Start: 2018-11-01 | End: 2018-11-07 | Stop reason: HOSPADM

## 2018-11-01 RX ORDER — LORAZEPAM 2 MG/ML
2 INJECTION INTRAMUSCULAR EVERY 6 HOURS PRN
Status: DISCONTINUED | OUTPATIENT
Start: 2018-11-01 | End: 2018-11-07 | Stop reason: HOSPADM

## 2018-11-01 RX ORDER — PRAZOSIN HYDROCHLORIDE 1 MG/1
1 CAPSULE ORAL 2 TIMES DAILY
Status: DISCONTINUED | OUTPATIENT
Start: 2018-11-01 | End: 2018-11-07 | Stop reason: HOSPADM

## 2018-11-01 RX ORDER — OLANZAPINE 5 MG/1
5 TABLET, ORALLY DISINTEGRATING ORAL NIGHTLY
Status: DISCONTINUED | OUTPATIENT
Start: 2018-11-01 | End: 2018-11-07 | Stop reason: HOSPADM

## 2018-11-01 RX ORDER — ACETAMINOPHEN 325 MG/1
650 TABLET ORAL EVERY 4 HOURS PRN
Status: DISCONTINUED | OUTPATIENT
Start: 2018-11-01 | End: 2018-11-07 | Stop reason: HOSPADM

## 2018-11-01 RX ADMIN — PANTOPRAZOLE SODIUM 20 MG: 20 TABLET, DELAYED RELEASE ORAL at 20:59

## 2018-11-01 RX ADMIN — DICYCLOMINE HYDROCHLORIDE 10 MG: 10 CAPSULE ORAL at 16:46

## 2018-11-01 RX ADMIN — SUCRALFATE 1 G: 1 TABLET ORAL at 20:59

## 2018-11-01 RX ADMIN — MIRTAZAPINE 30 MG: 30 TABLET, FILM COATED ORAL at 20:59

## 2018-11-01 RX ADMIN — SUCRALFATE 1 G: 1 TABLET ORAL at 16:45

## 2018-11-01 RX ADMIN — PANTOPRAZOLE SODIUM 20 MG: 20 TABLET, DELAYED RELEASE ORAL at 16:46

## 2018-11-01 RX ADMIN — PRAZOSIN HYDROCHLORIDE 1 MG: 1 CAPSULE ORAL at 20:59

## 2018-11-01 RX ADMIN — HYDROXYZINE HYDROCHLORIDE 50 MG: 50 TABLET, FILM COATED ORAL at 13:21

## 2018-11-01 RX ADMIN — DICYCLOMINE HYDROCHLORIDE 10 MG: 10 CAPSULE ORAL at 20:59

## 2018-11-01 RX ADMIN — ACETAMINOPHEN 650 MG: 325 TABLET, FILM COATED ORAL at 16:46

## 2018-11-01 RX ADMIN — PRAZOSIN HYDROCHLORIDE 1 MG: 1 CAPSULE ORAL at 16:46

## 2018-11-01 RX ADMIN — TRAZODONE HYDROCHLORIDE 50 MG: 50 TABLET ORAL at 20:59

## 2018-11-01 RX ADMIN — HYDROXYZINE HYDROCHLORIDE 50 MG: 50 TABLET, FILM COATED ORAL at 20:59

## 2018-11-01 RX ADMIN — OLANZAPINE 5 MG: 5 TABLET, ORALLY DISINTEGRATING ORAL at 20:59

## 2018-11-01 RX ADMIN — VORTIOXETINE 10 MG: 10 TABLET, FILM COATED ORAL at 16:46

## 2018-11-01 ASSESSMENT — PAIN DESCRIPTION - PAIN TYPE: TYPE: ACUTE PAIN

## 2018-11-01 ASSESSMENT — PAIN - FUNCTIONAL ASSESSMENT: PAIN_FUNCTIONAL_ASSESSMENT: 0-10

## 2018-11-01 ASSESSMENT — PAIN DESCRIPTION - ONSET: ONSET: SUDDEN

## 2018-11-01 ASSESSMENT — PAIN SCALES - GENERAL
PAINLEVEL_OUTOF10: 0
PAINLEVEL_OUTOF10: 3
PAINLEVEL_OUTOF10: 0
PAINLEVEL_OUTOF10: 0

## 2018-11-01 ASSESSMENT — PAIN DESCRIPTION - LOCATION
LOCATION: HEAD
LOCATION: HEAD

## 2018-11-01 ASSESSMENT — PAIN DESCRIPTION - DESCRIPTORS: DESCRIPTORS: ACHING

## 2018-11-01 NOTE — PROGRESS NOTES
use: Yes     Frequency: 6.0 times per week     Types: Marijuana    Sexual activity: Not Currently     Partners: Female     Other Topics Concern    Not on file     Social History Narrative    No narrative on file         Mental Status  Pt. was alert, fully oriented, and cooperative. Appearance and hygiene weredisheveled, poor hygiene . Mood was depressed. Affect was \"dysthymic and poorly reactive Thought process was linear and well-organized. Patient denied any hallucinations or paranoia. Patient denied suicidal ideations. Patient denied homicidal ideations . Patient's gross cognitive functions were impaired. Insight and judgement were fair. Both recent and remote memory were intact. Psychomotor status was without abnormality     Diagnostic Impression    Major depression and PTSD. Medications   nicotine  1 patch Transdermal Daily    dicyclomine  10 mg Oral 4x Daily    mirtazapine  30 mg Oral Nightly    OLANZapine zydis  5 mg Oral Nightly    pantoprazole  20 mg Oral BID    prazosin  1 mg Oral BID    sucralfate  1 g Oral 4x Daily    VORTIoxetine  10 mg Oral Daily     acetaminophen, hydrOXYzine, traZODone, benztropine mesylate, magnesium hydroxide, aluminum & magnesium hydroxide-simethicone, nicotine polacrilex, LORazepam, haloperidol lactate, promethazine    Treatment Plan:    1. Admit to inpatient psychiatric treatment  2. Supportive therapy with medication management. Continue current outpatient medications. Reviewed risks and benefits as well as potential side effects with patient. 3. Therapeutic activities and groups  4. Follow up at [facility] after symptoms stabilized    Estimated length of stay: 5-7 days    DON Borrero - CNS  Psychiatric Advanced Practice Nurse.

## 2018-11-01 NOTE — CARE COORDINATION
Writer attempted to complete pt's initial assessment, however pt was tearful and stopped the assessment and asked if he could finish with writer later. Writer will attempt at a later time.

## 2018-11-02 PROBLEM — F33.2 SEVERE EPISODE OF RECURRENT MAJOR DEPRESSIVE DISORDER, WITHOUT PSYCHOTIC FEATURES (HCC): Status: ACTIVE | Noted: 2018-11-01

## 2018-11-02 PROBLEM — F33.2 SEVERE EPISODE OF RECURRENT MAJOR DEPRESSIVE DISORDER, WITHOUT PSYCHOTIC FEATURES (HCC): Chronic | Status: ACTIVE | Noted: 2018-11-01

## 2018-11-02 LAB
-: ABNORMAL
AMORPHOUS: ABNORMAL
AMPHETAMINE SCREEN URINE: NEGATIVE
BACTERIA: ABNORMAL
BARBITURATE SCREEN URINE: NEGATIVE
BENZODIAZEPINE SCREEN, URINE: NEGATIVE
BILIRUBIN URINE: ABNORMAL
BUPRENORPHINE URINE: ABNORMAL
CANNABINOID SCREEN URINE: POSITIVE
CASTS UA: ABNORMAL /LPF
COCAINE METABOLITE, URINE: NEGATIVE
COLOR: ABNORMAL
COMMENT UA: ABNORMAL
CRYSTALS, UA: ABNORMAL /HPF
EPITHELIAL CELLS UA: ABNORMAL /HPF
GLUCOSE URINE: NEGATIVE
KETONES, URINE: ABNORMAL
LEUKOCYTE ESTERASE, URINE: NEGATIVE
MDMA URINE: ABNORMAL
METHADONE SCREEN, URINE: POSITIVE
METHAMPHETAMINE, URINE: ABNORMAL
MUCUS: ABNORMAL
NITRITE, URINE: NEGATIVE
OPIATES, URINE: NEGATIVE
OTHER OBSERVATIONS UA: ABNORMAL
OXYCODONE SCREEN URINE: NEGATIVE
PH UA: 6 (ref 5–8)
PHENCYCLIDINE, URINE: NEGATIVE
PROPOXYPHENE, URINE: ABNORMAL
PROTEIN UA: ABNORMAL
RBC UA: ABNORMAL /HPF
RENAL EPITHELIAL, UA: ABNORMAL /HPF
SPECIFIC GRAVITY UA: 1.03 (ref 1–1.03)
TEST INFORMATION: ABNORMAL
TRICHOMONAS: ABNORMAL
TRICYCLIC ANTIDEPRESSANTS, UR: ABNORMAL
TURBIDITY: ABNORMAL
URINE HGB: NEGATIVE
UROBILINOGEN, URINE: NORMAL
WBC UA: ABNORMAL /HPF
YEAST: ABNORMAL

## 2018-11-02 PROCEDURE — 1240000000 HC EMOTIONAL WELLNESS R&B

## 2018-11-02 PROCEDURE — 6370000000 HC RX 637 (ALT 250 FOR IP): Performed by: REGISTERED NURSE

## 2018-11-02 PROCEDURE — 81001 URINALYSIS AUTO W/SCOPE: CPT

## 2018-11-02 PROCEDURE — 99232 SBSQ HOSP IP/OBS MODERATE 35: CPT | Performed by: NURSE PRACTITIONER

## 2018-11-02 PROCEDURE — 80307 DRUG TEST PRSMV CHEM ANLYZR: CPT

## 2018-11-02 PROCEDURE — 6370000000 HC RX 637 (ALT 250 FOR IP): Performed by: NURSE PRACTITIONER

## 2018-11-02 PROCEDURE — 6360000002 HC RX W HCPCS: Performed by: REGISTERED NURSE

## 2018-11-02 RX ORDER — CYCLOBENZAPRINE HCL 10 MG
10 TABLET ORAL 3 TIMES DAILY PRN
Status: DISCONTINUED | OUTPATIENT
Start: 2018-11-02 | End: 2018-11-07 | Stop reason: HOSPADM

## 2018-11-02 RX ORDER — IBUPROFEN 800 MG/1
800 TABLET ORAL EVERY 8 HOURS PRN
Status: DISCONTINUED | OUTPATIENT
Start: 2018-11-02 | End: 2018-11-07 | Stop reason: HOSPADM

## 2018-11-02 RX ORDER — LOPERAMIDE HYDROCHLORIDE 2 MG/1
2 CAPSULE ORAL 4 TIMES DAILY PRN
Status: DISCONTINUED | OUTPATIENT
Start: 2018-11-02 | End: 2018-11-07 | Stop reason: HOSPADM

## 2018-11-02 RX ORDER — CLONIDINE HYDROCHLORIDE 0.1 MG/1
0.1 TABLET ORAL 3 TIMES DAILY
Status: DISCONTINUED | OUTPATIENT
Start: 2018-11-02 | End: 2018-11-07 | Stop reason: HOSPADM

## 2018-11-02 RX ORDER — DIPHENHYDRAMINE HCL 25 MG
25 TABLET ORAL NIGHTLY PRN
Status: DISCONTINUED | OUTPATIENT
Start: 2018-11-02 | End: 2018-11-07 | Stop reason: HOSPADM

## 2018-11-02 RX ORDER — ONDANSETRON 4 MG/1
4 TABLET, FILM COATED ORAL EVERY 8 HOURS PRN
Status: DISCONTINUED | OUTPATIENT
Start: 2018-11-02 | End: 2018-11-07 | Stop reason: HOSPADM

## 2018-11-02 RX ADMIN — DICYCLOMINE HYDROCHLORIDE 10 MG: 10 CAPSULE ORAL at 17:42

## 2018-11-02 RX ADMIN — HYDROXYZINE HYDROCHLORIDE 50 MG: 50 TABLET, FILM COATED ORAL at 08:26

## 2018-11-02 RX ADMIN — DICYCLOMINE HYDROCHLORIDE 10 MG: 10 CAPSULE ORAL at 13:03

## 2018-11-02 RX ADMIN — DICYCLOMINE HYDROCHLORIDE 10 MG: 10 CAPSULE ORAL at 09:05

## 2018-11-02 RX ADMIN — PRAZOSIN HYDROCHLORIDE 1 MG: 1 CAPSULE ORAL at 20:39

## 2018-11-02 RX ADMIN — PRAZOSIN HYDROCHLORIDE 1 MG: 1 CAPSULE ORAL at 09:04

## 2018-11-02 RX ADMIN — ACETAMINOPHEN 650 MG: 325 TABLET, FILM COATED ORAL at 09:04

## 2018-11-02 RX ADMIN — HYDROXYZINE HYDROCHLORIDE 50 MG: 50 TABLET, FILM COATED ORAL at 17:42

## 2018-11-02 RX ADMIN — OLANZAPINE 5 MG: 5 TABLET, ORALLY DISINTEGRATING ORAL at 20:42

## 2018-11-02 RX ADMIN — DIPHENHYDRAMINE HCL 25 MG: 25 TABLET ORAL at 21:56

## 2018-11-02 RX ADMIN — SUCRALFATE 1 G: 1 TABLET ORAL at 13:03

## 2018-11-02 RX ADMIN — CLONIDINE HYDROCHLORIDE 0.1 MG: 0.1 TABLET ORAL at 20:39

## 2018-11-02 RX ADMIN — VORTIOXETINE 10 MG: 10 TABLET, FILM COATED ORAL at 09:04

## 2018-11-02 RX ADMIN — PROMETHAZINE HYDROCHLORIDE 25 MG: 25 TABLET ORAL at 15:11

## 2018-11-02 RX ADMIN — PANTOPRAZOLE SODIUM 20 MG: 20 TABLET, DELAYED RELEASE ORAL at 09:04

## 2018-11-02 RX ADMIN — SUCRALFATE 1 G: 1 TABLET ORAL at 20:39

## 2018-11-02 RX ADMIN — HYDROXYZINE HYDROCHLORIDE 50 MG: 50 TABLET, FILM COATED ORAL at 20:39

## 2018-11-02 RX ADMIN — ALUMINUM HYDROXIDE, MAGNESIUM HYDROXIDE, AND SIMETHICONE 30 ML: 200; 200; 20 SUSPENSION ORAL at 08:27

## 2018-11-02 RX ADMIN — ACETAMINOPHEN 650 MG: 325 TABLET, FILM COATED ORAL at 15:10

## 2018-11-02 RX ADMIN — CLONIDINE HYDROCHLORIDE 0.1 MG: 0.1 TABLET ORAL at 17:42

## 2018-11-02 RX ADMIN — DICYCLOMINE HYDROCHLORIDE 10 MG: 10 CAPSULE ORAL at 20:39

## 2018-11-02 RX ADMIN — SUCRALFATE 1 G: 1 TABLET ORAL at 17:42

## 2018-11-02 RX ADMIN — TRAZODONE HYDROCHLORIDE 50 MG: 50 TABLET ORAL at 21:56

## 2018-11-02 RX ADMIN — SUCRALFATE 1 G: 1 TABLET ORAL at 09:05

## 2018-11-02 RX ADMIN — PROMETHAZINE HYDROCHLORIDE 25 MG: 25 TABLET ORAL at 06:52

## 2018-11-02 RX ADMIN — MIRTAZAPINE 30 MG: 30 TABLET, FILM COATED ORAL at 20:39

## 2018-11-02 RX ADMIN — PANTOPRAZOLE SODIUM 20 MG: 20 TABLET, DELAYED RELEASE ORAL at 20:39

## 2018-11-02 ASSESSMENT — ENCOUNTER SYMPTOMS
SPUTUM PRODUCTION: 0
VOMITING: 1
WHEEZING: 0
DIARRHEA: 0
BLOOD IN STOOL: 1
DOUBLE VISION: 0
COUGH: 0
BLURRED VISION: 0
BACK PAIN: 1
SORE THROAT: 0
CONSTIPATION: 1
ABDOMINAL PAIN: 1
NAUSEA: 1
SHORTNESS OF BREATH: 0
HEARTBURN: 1

## 2018-11-02 ASSESSMENT — PAIN DESCRIPTION - DESCRIPTORS: DESCRIPTORS: ACHING

## 2018-11-02 ASSESSMENT — PAIN SCALES - GENERAL
PAINLEVEL_OUTOF10: 4
PAINLEVEL_OUTOF10: 8
PAINLEVEL_OUTOF10: 4
PAINLEVEL_OUTOF10: 8
PAINLEVEL_OUTOF10: 8

## 2018-11-02 ASSESSMENT — PAIN DESCRIPTION - PAIN TYPE: TYPE: CHRONIC PAIN

## 2018-11-02 ASSESSMENT — PAIN DESCRIPTION - ONSET: ONSET: ON-GOING

## 2018-11-02 ASSESSMENT — PAIN DESCRIPTION - PROGRESSION: CLINICAL_PROGRESSION: NOT CHANGED

## 2018-11-02 ASSESSMENT — PAIN DESCRIPTION - FREQUENCY: FREQUENCY: CONTINUOUS

## 2018-11-02 ASSESSMENT — PAIN DESCRIPTION - LOCATION: LOCATION: OTHER (COMMENT)

## 2018-11-02 ASSESSMENT — PAIN DESCRIPTION - ORIENTATION: ORIENTATION: MID

## 2018-11-02 ASSESSMENT — LIFESTYLE VARIABLES: HISTORY_ALCOHOL_USE: NO

## 2018-11-02 NOTE — PROGRESS NOTES
1 mg at 11/02/18 9805    promethazine (PHENERGAN) tablet 25 mg  25 mg Oral Q6H PRN DON Camarillo - CNS   25 mg at 11/02/18 4521    sucralfate (CARAFATE) tablet 1 g  1 g Oral 4x Daily Michelle Lisa APRN - CNS   1 g at 11/02/18 4812    VORTIoxetine (TRINTELLIX) tablet 10 mg  10 mg Oral Daily Kely AKERS Lola guerreroDON - CNS   10 mg at 11/02/18 0880         nicotine  1 patch Transdermal Daily    dicyclomine  10 mg Oral 4x Daily    mirtazapine  30 mg Oral Nightly    OLANZapine zydis  5 mg Oral Nightly    pantoprazole  20 mg Oral BID    prazosin  1 mg Oral BID    sucralfate  1 g Oral 4x Daily    VORTIoxetine  10 mg Oral Daily       ASSESSMENT  Severe episode of recurrent major depressive disorder, without psychotic features ([facility] [address]     Patient's Response to Treatment: positive    PLAN:  1. Supportive therapy with medication management. Continue current outpatient medications. Reviewed risks and benefits as well as potential side effects with patient. 2. Therapeutic activities and groups  3. Follow up at [facility] after symptoms stabilized  4. Continue home medication  5.  Consult GI     Estimated length of stay: 5-7 days        Electronically signed by DON Greene CNP on 11/2/2018 at 12:13 PM.

## 2018-11-02 NOTE — FLOWSHEET NOTE
11/02/18 0942   Encounter Summary   Services provided to: Patient   Referral/Consult From: Rounding   Continue Visiting (11/2/18)   Complexity of Encounter Low   Length of Encounter 15 minutes   Routine   Type Initial   Spiritual/Yazidism   Type Spiritual support   Intervention Anointing   Sacraments   Sacrament of Sick-Anointing Anointed  (Fr Alcala 11/2/18)

## 2018-11-02 NOTE — PLAN OF CARE
Problem: Depressive Behavior With or Without Suicide Precautions:  Goal: Able to verbalize and/or display a decrease in depressive symptoms  Able to verbalize and/or display a decrease in depressive symptoms   Outcome: Ongoing  Pt did not attend RT group at 1330 d/t resting in room despite staff invitation to attend.

## 2018-11-02 NOTE — PLAN OF CARE
Problem: Depressive Behavior With or Without Suicide Precautions:  Goal: Able to verbalize and/or display a decrease in depressive symptoms  Able to verbalize and/or display a decrease in depressive symptoms   Outcome: Ongoing  Pt admits to depression 7/10 and high anxiety level; pt states that his anxiety decreases when he showers, he feels better feeling the water running on his body; pt has had shower x 3 since start of shift; pt is cooperative,polite, and controlled; safety maintained by every 15 minutes and random checks, med compliant. Goal: Ability to disclose and discuss suicidal ideas will improve  Ability to disclose and discuss suicidal ideas will improve   Outcome: Ongoing  Pt denies suicidal ideations, states that he feels safe here, but increased anxiety and cold temperature is causing him to be more depressed; pt denies ending his life at this time.

## 2018-11-02 NOTE — H&P
HISTORY and [id number]       NAME:  Mady Ingram  MRN: [id number]   YOB: 1990   Date: 11/2/2018   Age: 33 y.o. Gender: male     COMPLAINT AND PRESENT HISTORY:      Mady Ingram is 33 y.o.,  male, admitted because of depression. Pt admitted via ED with SI, he denies plan to examiner. Pt is very tearful during examination. It was reported to pt that he had +THC in urine and +methadone. Pt is concerned why he had methadone in his urine as he was negative 10/31/18 at [address] (looked up in computer). Pt also found to have WBCs 21.2 on 10/31/18 at [facility]. No recent labs since 10/31/18 to review. Pt is extremely anxious, very tearful and overwhelmed. Pt states he has a history of stomach ulcers, RUQ pain. He states the pain comes and goes, feels like cramping and at times like \"burning\" sensation. Pt states warm showers help with pain and laying down. Denies increase in pain after meals or with activity. He states he has had EGD and colonoscopy. Reports he has hx of diverticulitis. Has been seen multiple times in various ED's for abdominal pain. States at times he has had blood in stool after strenuous physical activity such as playing basketball. This has been worsening in the last year. He has lost 15 lbs. He has nausea when he smells food and is unable to eat. Has had constipation x 2 days due to not being able to eat. Vomiting immediately after he eats. GI consulted per psych NP. Pt denies any homicidal ideation. Pt denies a history of previous suicide attempts. Pt feels hopeless, helpless, worthless, lack of interest, loss of energy. Poor insight. Pt has poor sleep, loss of appetite, poor concentration and memory. No current auditory, visual or tactile hallucinations. Patients current stressors include his uncontrolled medical conditions and mental illness. Patient denies any current alcohol abuse.  Patient smokes marijuana, states he is self

## 2018-11-03 ENCOUNTER — APPOINTMENT (OUTPATIENT)
Dept: CT IMAGING | Age: 28
DRG: 751 | End: 2018-11-03
Attending: PSYCHIATRY & NEUROLOGY
Payer: MEDICARE

## 2018-11-03 PROCEDURE — 1240000000 HC EMOTIONAL WELLNESS R&B

## 2018-11-03 PROCEDURE — 74176 CT ABD & PELVIS W/O CONTRAST: CPT

## 2018-11-03 PROCEDURE — 6360000004 HC RX CONTRAST MEDICATION: Performed by: FAMILY MEDICINE

## 2018-11-03 PROCEDURE — 6370000000 HC RX 637 (ALT 250 FOR IP): Performed by: NURSE PRACTITIONER

## 2018-11-03 PROCEDURE — 99254 IP/OBS CNSLTJ NEW/EST MOD 60: CPT | Performed by: INTERNAL MEDICINE

## 2018-11-03 PROCEDURE — 6370000000 HC RX 637 (ALT 250 FOR IP): Performed by: REGISTERED NURSE

## 2018-11-03 RX ADMIN — CLONIDINE HYDROCHLORIDE 0.1 MG: 0.1 TABLET ORAL at 10:01

## 2018-11-03 RX ADMIN — DIPHENHYDRAMINE HCL 25 MG: 25 TABLET ORAL at 22:05

## 2018-11-03 RX ADMIN — DICYCLOMINE HYDROCHLORIDE 10 MG: 10 CAPSULE ORAL at 18:15

## 2018-11-03 RX ADMIN — MIRTAZAPINE 30 MG: 30 TABLET, FILM COATED ORAL at 22:05

## 2018-11-03 RX ADMIN — SUCRALFATE 1 G: 1 TABLET ORAL at 21:44

## 2018-11-03 RX ADMIN — SUCRALFATE 1 G: 1 TABLET ORAL at 10:01

## 2018-11-03 RX ADMIN — SUCRALFATE 1 G: 1 TABLET ORAL at 18:17

## 2018-11-03 RX ADMIN — PANTOPRAZOLE SODIUM 20 MG: 20 TABLET, DELAYED RELEASE ORAL at 10:01

## 2018-11-03 RX ADMIN — TRAZODONE HYDROCHLORIDE 50 MG: 50 TABLET ORAL at 22:05

## 2018-11-03 RX ADMIN — CLONIDINE HYDROCHLORIDE 0.1 MG: 0.1 TABLET ORAL at 21:44

## 2018-11-03 RX ADMIN — PRAZOSIN HYDROCHLORIDE 1 MG: 1 CAPSULE ORAL at 21:44

## 2018-11-03 RX ADMIN — DICYCLOMINE HYDROCHLORIDE 10 MG: 10 CAPSULE ORAL at 10:01

## 2018-11-03 RX ADMIN — PRAZOSIN HYDROCHLORIDE 1 MG: 1 CAPSULE ORAL at 10:01

## 2018-11-03 RX ADMIN — PANTOPRAZOLE SODIUM 20 MG: 20 TABLET, DELAYED RELEASE ORAL at 21:44

## 2018-11-03 RX ADMIN — OLANZAPINE 5 MG: 5 TABLET, ORALLY DISINTEGRATING ORAL at 22:05

## 2018-11-03 RX ADMIN — DICYCLOMINE HYDROCHLORIDE 10 MG: 10 CAPSULE ORAL at 21:44

## 2018-11-03 RX ADMIN — IOHEXOL 50 ML: 240 INJECTION, SOLUTION INTRATHECAL; INTRAVASCULAR; INTRAVENOUS; ORAL at 17:08

## 2018-11-03 RX ADMIN — VORTIOXETINE 10 MG: 10 TABLET, FILM COATED ORAL at 10:01

## 2018-11-03 ASSESSMENT — PAIN DESCRIPTION - LOCATION: LOCATION: ABDOMEN

## 2018-11-03 ASSESSMENT — PAIN SCALES - GENERAL
PAINLEVEL_OUTOF10: 0
PAINLEVEL_OUTOF10: 4

## 2018-11-03 NOTE — PLAN OF CARE
Problem: Depressive Behavior With or Without Suicide Precautions:  Goal: Ability to disclose and discuss suicidal ideas will improve  Ability to disclose and discuss suicidal ideas will improve   Outcome: Ongoing  Pt did not attend Community Meeting at 0900 d/t resting in room despite staff invitation to attend.

## 2018-11-03 NOTE — CONSULTS
Anxiety; Appendicitis; Diverticulitis; History of acute renal failure; History of stomach ulcers; Irritable bowel syndrome; and Ulcer. Past Surgical History:   has a past surgical history that includes Appendectomy; Upper gastrointestinal endoscopy; Colonoscopy; and Pritchett tooth extraction. Home Medications:    Prior to Admission medications    Medication Sig Start Date End Date Taking? Authorizing Provider   mirtazapine (REMERON) 30 MG tablet Take 30 mg by mouth nightly   Yes Historical Provider, MD   OLANZapine zydis (ZYPREXA) 5 MG disintegrating tablet Take 5 mg by mouth nightly   Yes Historical Provider, MD   prazosin (MINIPRESS) 1 MG capsule Take 1 mg by mouth 2 times daily   Yes Historical Provider, MD   hydrOXYzine (VISTARIL) 25 MG capsule Take 25 mg by mouth 3 times daily as needed for Anxiety   Yes Historical Provider, MD   clonazePAM (KLONOPIN) 0.5 MG tablet Take 0.5 mg by mouth nightly. . 10/3/18 11/10/18 Yes Historical Provider, MD   VORTIoxetine (TRINTELLIX) 10 MG TABS tablet Take 10 mg by mouth daily   Yes Historical Provider, MD   traZODone (DESYREL) 50 MG tablet Take 1-2 tablets by mouth nightly 9/24/18  Yes Gerry Connelly MD   pantoprazole (PROTONIX) 20 MG tablet Take 1 tablet by mouth 2 times daily 9/24/18  Yes Gerry Connelly MD   dicyclomine (BENTYL) 10 MG capsule Take 1 capsule by mouth 4 times daily 9/24/18  Yes Gerry Connelly MD   sucralfate (CARAFATE) 1 GM tablet Take 1 tablet by mouth 4 times daily 7/30/18  Yes Gerry Connelly MD   promethazine (PHENERGAN) 25 MG tablet Take 1 tablet by mouth every 6 hours as needed for Nausea 7/30/18  Yes Gerry Connelly MD       Allergies:  Zoloft [sertraline hcl]    Social History:   reports that he has quit smoking. His smoking use included Cigarettes. He smoked 0.50 packs per day. He has never used smokeless tobacco. He reports that he drinks about 1.2 oz of alcohol per week .  He reports that he uses drugs, including Marijuana, about 6 times per extremities normal, atraumatic, no cyanosis or edema  · Neurological: Gait normal. Reflexes normal and symmetric. Sensation grossly normal  · Skin - no rash, no lump   · Eye no icterus no redness  · Lymphatic system-no lymphadenopathy no splenomegaly       DIAGNOSTICS:    Laboratory Testing:  CBC: No results for input(s): WBC, HGB, PLT in the last 72 hours. BMP:  No results for input(s): NA, K, CL, CO2, BUN, CREATININE, GLUCOSE in the last 72 hours. S. Calcium:No results for input(s): CALCIUM in the last 72 hours. S. Ionized Calcium:No results for input(s): IONCA in the last 72 hours. S. Magnesium:No results for input(s): MG in the last 72 hours. S. Phosphorus:No results for input(s): PHOS in the last 72 hours. S. Glucose:No results for input(s): POCGLU in the last 72 hours. Glycosylated hemoglobin A1C: No results for input(s): LABA1C in the last 72 hours. INR: No results for input(s): INR in the last 72 hours. Hepatic functions: No results for input(s): ALKPHOS, ALT, AST, PROT, BILITOT, BILIDIR, LABALBU in the last 72 hours. Pancreatic functions:No results for input(s): LACTA, AMYLASE in the last 72 hours. S. Lactic Acid: No results for input(s): LACTA in the last 72 hours. Cardiac enzymes:No results for input(s): CKTOTAL, CKMB, CKMBINDEX, TROPONINI in the last 72 hours. BNP:No results for input(s): BNP in the last 72 hours. Lipid profile: No results for input(s): CHOL, TRIG, HDL, LDLCALC in the last 72 hours.     Invalid input(s): LDL  Blood Gases: No results found for: PH, PCO2, PO2, HCO3, O2SAT  Thyroid functions:   Lab Results   Component Value Date    TSH 0.34 09/18/2018        Imaging/Diagonstics:    Ct Abdomen Pelvis Wo Contrast Additional Contrast? Oral    Result Date: 11/3/2018  EXAMINATION: CT OF THE ABDOMEN AND PELVIS WITHOUT CONTRAST 11/3/2018 5:10 pm TECHNIQUE: CT of the abdomen and pelvis was performed without the administration of intravenous contrast. Multiplanar reformatted images are

## 2018-11-04 LAB
ABSOLUTE EOS #: 0.1 K/UL (ref 0–0.4)
ABSOLUTE IMMATURE GRANULOCYTE: ABNORMAL K/UL (ref 0–0.3)
ABSOLUTE LYMPH #: 2.3 K/UL (ref 1–4.8)
ABSOLUTE MONO #: 0.9 K/UL (ref 0.1–1.3)
BASOPHILS # BLD: 1 % (ref 0–2)
BASOPHILS ABSOLUTE: 0.1 K/UL (ref 0–0.2)
DIFFERENTIAL TYPE: ABNORMAL
EOSINOPHILS RELATIVE PERCENT: 1 % (ref 0–4)
HCT VFR BLD CALC: 42.5 % (ref 41–53)
HEMOGLOBIN: 15.2 G/DL (ref 13.5–17.5)
IMMATURE GRANULOCYTES: ABNORMAL %
LYMPHOCYTES # BLD: 25 % (ref 24–44)
MCH RBC QN AUTO: 31.7 PG (ref 26–34)
MCHC RBC AUTO-ENTMCNC: 35.8 G/DL (ref 31–37)
MCV RBC AUTO: 88.6 FL (ref 80–100)
MONOCYTES # BLD: 10 % (ref 1–7)
NRBC AUTOMATED: ABNORMAL PER 100 WBC
PDW BLD-RTO: 13.5 % (ref 11.5–14.9)
PLATELET # BLD: 331 K/UL (ref 150–450)
PLATELET ESTIMATE: ABNORMAL
PMV BLD AUTO: 7.5 FL (ref 6–12)
RBC # BLD: 4.8 M/UL (ref 4.5–5.9)
RBC # BLD: ABNORMAL 10*6/UL
SEG NEUTROPHILS: 63 % (ref 36–66)
SEGMENTED NEUTROPHILS ABSOLUTE COUNT: 5.9 K/UL (ref 1.3–9.1)
WBC # BLD: 9.2 K/UL (ref 3.5–11)
WBC # BLD: ABNORMAL 10*3/UL

## 2018-11-04 PROCEDURE — 1240000000 HC EMOTIONAL WELLNESS R&B

## 2018-11-04 PROCEDURE — 36415 COLL VENOUS BLD VENIPUNCTURE: CPT

## 2018-11-04 PROCEDURE — 6370000000 HC RX 637 (ALT 250 FOR IP): Performed by: NURSE PRACTITIONER

## 2018-11-04 PROCEDURE — 99232 SBSQ HOSP IP/OBS MODERATE 35: CPT | Performed by: PSYCHIATRY & NEUROLOGY

## 2018-11-04 PROCEDURE — 85025 COMPLETE CBC W/AUTO DIFF WBC: CPT

## 2018-11-04 PROCEDURE — 6370000000 HC RX 637 (ALT 250 FOR IP): Performed by: REGISTERED NURSE

## 2018-11-04 PROCEDURE — 99231 SBSQ HOSP IP/OBS SF/LOW 25: CPT | Performed by: INTERNAL MEDICINE

## 2018-11-04 PROCEDURE — 6360000002 HC RX W HCPCS: Performed by: REGISTERED NURSE

## 2018-11-04 RX ORDER — DICYCLOMINE HCL 20 MG
10 TABLET ORAL
Status: DISCONTINUED | OUTPATIENT
Start: 2018-11-04 | End: 2018-11-07 | Stop reason: HOSPADM

## 2018-11-04 RX ADMIN — MIRTAZAPINE 30 MG: 30 TABLET, FILM COATED ORAL at 21:32

## 2018-11-04 RX ADMIN — CLONIDINE HYDROCHLORIDE 0.1 MG: 0.1 TABLET ORAL at 21:32

## 2018-11-04 RX ADMIN — DICYCLOMINE HYDROCHLORIDE 10 MG: 10 CAPSULE ORAL at 08:40

## 2018-11-04 RX ADMIN — CLONIDINE HYDROCHLORIDE 0.1 MG: 0.1 TABLET ORAL at 08:40

## 2018-11-04 RX ADMIN — PROMETHAZINE HYDROCHLORIDE 25 MG: 25 TABLET ORAL at 08:23

## 2018-11-04 RX ADMIN — DICYCLOMINE HYDROCHLORIDE 10 MG: 20 TABLET ORAL at 17:14

## 2018-11-04 RX ADMIN — SUCRALFATE 1 G: 1 TABLET ORAL at 21:32

## 2018-11-04 RX ADMIN — CLONIDINE HYDROCHLORIDE 0.1 MG: 0.1 TABLET ORAL at 13:09

## 2018-11-04 RX ADMIN — DICYCLOMINE HYDROCHLORIDE 10 MG: 20 TABLET ORAL at 21:32

## 2018-11-04 RX ADMIN — PANTOPRAZOLE SODIUM 20 MG: 20 TABLET, DELAYED RELEASE ORAL at 22:54

## 2018-11-04 RX ADMIN — OLANZAPINE 5 MG: 5 TABLET, ORALLY DISINTEGRATING ORAL at 21:32

## 2018-11-04 RX ADMIN — DICYCLOMINE HYDROCHLORIDE 10 MG: 10 CAPSULE ORAL at 12:23

## 2018-11-04 RX ADMIN — SUCRALFATE 1 G: 1 TABLET ORAL at 12:23

## 2018-11-04 RX ADMIN — PRAZOSIN HYDROCHLORIDE 1 MG: 1 CAPSULE ORAL at 08:40

## 2018-11-04 RX ADMIN — VORTIOXETINE 10 MG: 10 TABLET, FILM COATED ORAL at 08:40

## 2018-11-04 RX ADMIN — PRAZOSIN HYDROCHLORIDE 1 MG: 1 CAPSULE ORAL at 21:32

## 2018-11-04 RX ADMIN — DIPHENHYDRAMINE HCL 25 MG: 25 TABLET ORAL at 22:54

## 2018-11-04 RX ADMIN — SUCRALFATE 1 G: 1 TABLET ORAL at 17:14

## 2018-11-04 RX ADMIN — PANTOPRAZOLE SODIUM 20 MG: 20 TABLET, DELAYED RELEASE ORAL at 08:40

## 2018-11-04 RX ADMIN — SUCRALFATE 1 G: 1 TABLET ORAL at 08:40

## 2018-11-04 RX ADMIN — TRAZODONE HYDROCHLORIDE 50 MG: 50 TABLET ORAL at 22:54

## 2018-11-04 NOTE — PLAN OF CARE
Problem: Depressive Behavior With or Without Suicide Precautions:  Goal: Able to verbalize and/or display a decrease in depressive symptoms  Able to verbalize and/or display a decrease in depressive symptoms    Outcome: Ongoing  Pt. Reports having increased feelings of anxiety and states that he feels this way as a result of several deaths in the family. Pt. States that he has been feeling overwhelmed today. Pt. Is calm and cooperative upon assessment. Q15min checks continued for safety. Problem: Falls - Risk of:  Goal: Will remain free from falls  Will remain free from falls    Outcome: Ongoing  Pt. Remains free from harm or falls and is safe on the unit. Pt. Denies any suicidal or homicidal ideations and reports no auditory/visual hallucinations. Pt. States that he gets nauseous while eating and that he feels like vomiting at times. Q15min checks continued for safety.

## 2018-11-04 NOTE — PLAN OF CARE
Problem: Depressive Behavior With or Without Suicide Precautions:  Goal: Ability to disclose and discuss suicidal ideas will improve  Ability to disclose and discuss suicidal ideas will improve   Outcome: Ongoing  Patient denies suicidal ideas at this time. Patient has been out in day room watching tv and socializing with select staff. Problem: Falls - Risk of:  Goal: Will remain free from falls  Will remain free from falls   Outcome: Ongoing  Patient is free of falls at this time. Patient gait is steady. Patient is wearing non skid footwear and agrees to seek out staff for assistance as needed. Patient is free of self harm at this time. Patient agrees to seek out staff if thoughts to harm self arise. Staff will provide support and reassurance as needed. Safety checks maintained every 15 minutes.

## 2018-11-05 PROCEDURE — 99232 SBSQ HOSP IP/OBS MODERATE 35: CPT | Performed by: PSYCHIATRY & NEUROLOGY

## 2018-11-05 PROCEDURE — 6370000000 HC RX 637 (ALT 250 FOR IP): Performed by: REGISTERED NURSE

## 2018-11-05 PROCEDURE — 6360000002 HC RX W HCPCS: Performed by: REGISTERED NURSE

## 2018-11-05 PROCEDURE — 1240000000 HC EMOTIONAL WELLNESS R&B

## 2018-11-05 PROCEDURE — 6370000000 HC RX 637 (ALT 250 FOR IP): Performed by: NURSE PRACTITIONER

## 2018-11-05 RX ADMIN — DICYCLOMINE HYDROCHLORIDE 10 MG: 20 TABLET ORAL at 21:08

## 2018-11-05 RX ADMIN — DICYCLOMINE HYDROCHLORIDE 10 MG: 20 TABLET ORAL at 16:54

## 2018-11-05 RX ADMIN — PRAZOSIN HYDROCHLORIDE 1 MG: 1 CAPSULE ORAL at 08:42

## 2018-11-05 RX ADMIN — HYDROXYZINE HYDROCHLORIDE 50 MG: 50 TABLET, FILM COATED ORAL at 07:39

## 2018-11-05 RX ADMIN — VORTIOXETINE 10 MG: 10 TABLET, FILM COATED ORAL at 08:42

## 2018-11-05 RX ADMIN — CLONIDINE HYDROCHLORIDE 0.1 MG: 0.1 TABLET ORAL at 08:42

## 2018-11-05 RX ADMIN — OLANZAPINE 5 MG: 5 TABLET, ORALLY DISINTEGRATING ORAL at 21:07

## 2018-11-05 RX ADMIN — PANTOPRAZOLE SODIUM 20 MG: 20 TABLET, DELAYED RELEASE ORAL at 08:42

## 2018-11-05 RX ADMIN — PANTOPRAZOLE SODIUM 20 MG: 20 TABLET, DELAYED RELEASE ORAL at 21:08

## 2018-11-05 RX ADMIN — CLONIDINE HYDROCHLORIDE 0.1 MG: 0.1 TABLET ORAL at 14:31

## 2018-11-05 RX ADMIN — SUCRALFATE 1 G: 1 TABLET ORAL at 13:30

## 2018-11-05 RX ADMIN — SUCRALFATE 1 G: 1 TABLET ORAL at 21:08

## 2018-11-05 RX ADMIN — TRAZODONE HYDROCHLORIDE 50 MG: 50 TABLET ORAL at 22:03

## 2018-11-05 RX ADMIN — PRAZOSIN HYDROCHLORIDE 1 MG: 1 CAPSULE ORAL at 21:08

## 2018-11-05 RX ADMIN — DIPHENHYDRAMINE HCL 25 MG: 25 TABLET ORAL at 22:03

## 2018-11-05 RX ADMIN — HYDROXYZINE HYDROCHLORIDE 50 MG: 50 TABLET, FILM COATED ORAL at 16:54

## 2018-11-05 RX ADMIN — SUCRALFATE 1 G: 1 TABLET ORAL at 16:54

## 2018-11-05 RX ADMIN — MIRTAZAPINE 30 MG: 30 TABLET, FILM COATED ORAL at 21:08

## 2018-11-05 RX ADMIN — CLONIDINE HYDROCHLORIDE 0.1 MG: 0.1 TABLET ORAL at 21:08

## 2018-11-05 RX ADMIN — PROMETHAZINE HYDROCHLORIDE 25 MG: 25 TABLET ORAL at 21:07

## 2018-11-05 RX ADMIN — DICYCLOMINE HYDROCHLORIDE 10 MG: 20 TABLET ORAL at 06:36

## 2018-11-05 RX ADMIN — SUCRALFATE 1 G: 1 TABLET ORAL at 08:42

## 2018-11-05 NOTE — PLAN OF CARE
Problem: Depressive Behavior With or Without Suicide Precautions:  Goal: Ability to disclose and discuss suicidal ideas will improve  Ability to disclose and discuss suicidal ideas will improve    Outcome: Ongoing  PSYCHOEDUCATION GROUP NOTE    Date: 11/5/18  Start Time: 1100  End Time: 1130    Number Participants in Group:  12/18    Goal:  Patient will demonstrate increased interpersonal interaction   Topic: Problem Solving/Leisure Awareness/Socialization    Discipline Responsible:   OT  AT    Ns. x RT MHP Other       Participation Level:     None  Minimal   x Active Listener x Interactive    Monopolizing         Participation Quality:  x Appropriate  Inappropriate   x       Attentive        Intrusive   x       Sharing        Resistant          Supportive        Lethargic       Affective:   x Congruent  Incongruent  Blunted  Flat    Constricted  Anxious  Elated  Angry    Labile  Depressed  Other         Cognitive:  x Alert x Oriented PPTP     Concentration x G  F  P   Attention Span x G  F  P   Short-Term Memory x G  F  P   Long-Term Memory x G  F  P   ProblemSolving/  Decision Making x G  F  P   Ability to Process  Information x G  F  P      Contributing Factors             Delusional             Hallucinating             Flight of Ideas             Other:       Modes of Intervention:  x Education x Support x Exploration    Clarifying x Problem Solving  Confrontation   x Socialization  Limit Setting x Reality Testing   x Activity  Movement x Media    Other:            Response to Learning:  x Able to verbalize current knowledge/experience   x Able to verbalize/acknowledge new learning   x Able to retain information    Capable of insight    Able to change behavior   x Progressing to goal    Other:        Comments:

## 2018-11-05 NOTE — PLAN OF CARE
Problem: Depressive Behavior With or Without Suicide Precautions:  Goal: Able to verbalize and/or display a decrease in depressive symptoms  Able to verbalize and/or display a decrease in depressive symptoms    Outcome: Ongoing  Pt declined to attend psychotherapy at 1000 am despite encouragement. Pt offered 1:1 and refused.

## 2018-11-05 NOTE — PLAN OF CARE
Problem: Depressive Behavior With or Without Suicide Precautions:  Goal: Able to verbalize and/or display a decrease in depressive symptoms  Able to verbalize and/or display a decrease in depressive symptoms    Outcome: Ongoing  Patient denies any thoughts of self harm. Denies hallucinations. Attends select group. Social in day area with peers. 15 minute checks maintained for safety.

## 2018-11-05 NOTE — PROGRESS NOTES
[address] [facility].                Date:   11/4/2018  Patient name:  Tu Cueto  Date of admission:  11/1/2018  9:15 AM  MRN:   [id number]  YOB: 1990    Pt seen at the request of Lexx Conklin MD    CHIEF COMPLAINT:     History Obtained From:  Patient and chart review. HISTORY OF PRESENT ILLNESS:      The patient is a 33 y.o.  male who is admitted to the hospital forThis is a 33 y.o.   male who was admitted 11/1/2018 with Major depression, recurrent ([facility] [address] [F33.9]. We have been asked to see the patient in consultation by Lexx Conklin MD for  Stomach ulcers, nausea abdominal pain. The pt presented to the ED for SI and anxiety. He reports nausea, non radiating right upper quadrant pain and white/clear colored emesis for the last three days. He states that the pain is intermittent and is worse with food. He denies dysphagia and odynophagia. He has lost 15# recently. He states he had a EGD and colonoscopy at [facility] 3 months ago that showed ulcers but no reports of these are in the computer today. He states he was not given any acid reflux medication to take at home. He had coffee ground emesis and hematochezia three weeks ago that is now resolved. He denies NSAID use and is not on anticoagulation medications. He admits to smoking marijuana at least 4 times per day since the age of 6. He also tested positive for methadone. He has been taking hot showers here to help with his symptoms. His mother has IBD and he reports his uncle has crohns disease. He has not been told that he has IBD. He denies a family history of colon cancer and liver disease. He has leucocytosis. He denies alcohol use. He has not had recent imaging; his abdominal ct in august of this year showed hepatic steatosis with borderline hepatomegaly and diverticulosis.     abd pain better    Past Medical History:   has a past medical history of Acid reflux; week.     Family History: family history includes Allergies in his brother; Cancer in his maternal uncle; Crohn's Disease in his maternal uncle; Kidney Disease in his mother; Other in his father and mother. REVIEW OF SYSTEMS:    · Constitutional: Negative for weight loss  · Eyes: Negative for visual changes, diplopia, scleral icterus. · ENT: Negative for Headaches, hearing loss, vertigo, mouth sores, sore throat. · Cardiovascular: Negative for lightheadedness/orthostatic symptoms ,chest pain, dyspnea on exertion, palpitations or loss of consciousness. · Respiratory: Negative for cough or wheezing, sputum production, hemoptysis, pleuritic pain. · Gastrointestinal: pos for nausea/vomiting, change in bowel habits, abdominal pain, dysphagia/appetite loss, hematemesis, blood in stools. · Genitourinary:Negative for change in bladder habits, dysuria, trouble voiding, hematuria. · Musculoskeletal: Negative for gait disturbance, weakness, joint complaints. · Integumentary: Negative for rash, pruritis. · Neurological: Negative for headache, dizziness, change in muscle strength, numbness/tingling, change in gait, balance, coordination,   · Endocrine: negative for temperature intolerance, excessive thirst, fluid intake, or urination, tremor. · Hematologic/Lymphatic: negative for abnormal bruising or bleeding, blood clots, swollen lymph nodes. · Allergic/Immunologic: negative for nasal congestion, pruritis, hives. PHYSICAL EXAM:    /79   Pulse 99   Temp 98.1 °F (36.7 °C) (Oral)   Resp 14   Ht 5' 10\" (1.778 m)   Wt 180 lb (81.6 kg)   SpO2 100%   BMI 25.83 kg/m²      · General appearance: well nourished  · HEENT: Head: Normocephalic, no lesions, without obvious abnormality.   · Lungs: clear to auscultation bilaterally  · Heart: regular rate and rhythm, S1, S2 normal, no murmur, click, rub or gallop  · Abdomen:   ·  diffuse tenderness bowel sounds normal; no masses,  no organomegaly  · Extremities: extremities normal, atraumatic, no cyanosis or edema  · Neurological: Gait normal. Reflexes normal and symmetric. Sensation grossly normal  · Skin - no rash, no lump   · Eye no icterus no redness  · Lymphatic system-no lymphadenopathy no splenomegaly       DIAGNOSTICS:    Laboratory Testing:  CBC:   Recent Labs      11/04/18   0922   WBC  9.2   HGB  15.2   PLT  331     BMP:  No results for input(s): NA, K, CL, CO2, BUN, CREATININE, GLUCOSE in the last 72 hours. S. Calcium:No results for input(s): CALCIUM in the last 72 hours. S. Ionized Calcium:No results for input(s): IONCA in the last 72 hours. S. Magnesium:No results for input(s): MG in the last 72 hours. S. Phosphorus:No results for input(s): PHOS in the last 72 hours. S. Glucose:No results for input(s): POCGLU in the last 72 hours. Glycosylated hemoglobin A1C: No results for input(s): LABA1C in the last 72 hours. INR: No results for input(s): INR in the last 72 hours. Hepatic functions: No results for input(s): ALKPHOS, ALT, AST, PROT, BILITOT, BILIDIR, LABALBU in the last 72 hours. Pancreatic functions:No results for input(s): LACTA, AMYLASE in the last 72 hours. S. Lactic Acid: No results for input(s): LACTA in the last 72 hours. Cardiac enzymes:No results for input(s): CKTOTAL, CKMB, CKMBINDEX, TROPONINI in the last 72 hours. BNP:No results for input(s): BNP in the last 72 hours. Lipid profile: No results for input(s): CHOL, TRIG, HDL, LDLCALC in the last 72 hours.     Invalid input(s): LDL  Blood Gases: No results found for: PH, PCO2, PO2, HCO3, O2SAT  Thyroid functions:   Lab Results   Component Value Date    TSH 0.34 09/18/2018        Imaging/Diagonstics:    Ct Abdomen Pelvis Wo Contrast Additional Contrast? Oral    Result Date: 11/3/2018  EXAMINATION: CT OF THE ABDOMEN AND PELVIS WITHOUT CONTRAST 11/3/2018 5:10 pm TECHNIQUE: CT of the abdomen and pelvis was performed without the administration of intravenous contrast. Multiplanar reformatted  Epigastric pain       PLAN:  1. Abdominal pain with leucocytosis and nausea, emesis etiology unclear at this time; consider PUD, gastritis, colitis, also need to consider cyclic vomiting syndrome related to marijuana use  -will place order to obtain medical records from recent EGD and colonoscopy at [facility]  -continue the ppi and antiemetics  -pt may need repeat EGD if symptoms do not improve; will discuss with Dr Sanjay Walsh.  -pain management per primary service  -ct of the abdomen ordered to rule out colitis due to leucocytosis and for further eval of the abdominal pain.  -marijuana cessation    Will follow   ct abd     may need colonoscopy     11/4 abd pain better ct neg    likely from [address] use lft nl     Lipase nl     tolerating po    no plans for egd per gi     MD MARIANN Chauhan[address], [address] [place] [address].    Phone [phone]   Fax: [phone]  Answering Service: [phone]

## 2018-11-06 VITALS
HEIGHT: 70 IN | WEIGHT: 180 LBS | RESPIRATION RATE: 14 BRPM | DIASTOLIC BLOOD PRESSURE: 50 MMHG | SYSTOLIC BLOOD PRESSURE: 117 MMHG | OXYGEN SATURATION: 100 % | BODY MASS INDEX: 25.77 KG/M2 | HEART RATE: 72 BPM | TEMPERATURE: 97.4 F

## 2018-11-06 PROCEDURE — 1240000000 HC EMOTIONAL WELLNESS R&B

## 2018-11-06 PROCEDURE — 6370000000 HC RX 637 (ALT 250 FOR IP): Performed by: NURSE PRACTITIONER

## 2018-11-06 PROCEDURE — 6370000000 HC RX 637 (ALT 250 FOR IP): Performed by: REGISTERED NURSE

## 2018-11-06 PROCEDURE — 99232 SBSQ HOSP IP/OBS MODERATE 35: CPT | Performed by: REGISTERED NURSE

## 2018-11-06 RX ORDER — MIRTAZAPINE 30 MG/1
30 TABLET, FILM COATED ORAL NIGHTLY
Qty: 30 TABLET | Refills: 0 | Status: ON HOLD | OUTPATIENT
Start: 2018-11-06 | End: 2020-11-18 | Stop reason: HOSPADM

## 2018-11-06 RX ORDER — OLANZAPINE 5 MG/1
5 TABLET, ORALLY DISINTEGRATING ORAL NIGHTLY
Qty: 30 TABLET | Refills: 0 | Status: ON HOLD | OUTPATIENT
Start: 2018-11-06 | End: 2019-05-17 | Stop reason: HOSPADM

## 2018-11-06 RX ORDER — TRAZODONE HYDROCHLORIDE 50 MG/1
50 TABLET ORAL NIGHTLY PRN
Qty: 30 TABLET | Refills: 0 | Status: ON HOLD | OUTPATIENT
Start: 2018-11-06 | End: 2019-05-17 | Stop reason: HOSPADM

## 2018-11-06 RX ORDER — PRAZOSIN HYDROCHLORIDE 1 MG/1
1 CAPSULE ORAL 2 TIMES DAILY
Qty: 60 CAPSULE | Refills: 0 | Status: ON HOLD | OUTPATIENT
Start: 2018-11-06 | End: 2019-05-17 | Stop reason: HOSPADM

## 2018-11-06 RX ADMIN — PRAZOSIN HYDROCHLORIDE 1 MG: 1 CAPSULE ORAL at 08:38

## 2018-11-06 RX ADMIN — SUCRALFATE 1 G: 1 TABLET ORAL at 16:57

## 2018-11-06 RX ADMIN — DICYCLOMINE HYDROCHLORIDE 10 MG: 20 TABLET ORAL at 20:47

## 2018-11-06 RX ADMIN — CLONIDINE HYDROCHLORIDE 0.1 MG: 0.1 TABLET ORAL at 08:38

## 2018-11-06 RX ADMIN — DICYCLOMINE HYDROCHLORIDE 10 MG: 20 TABLET ORAL at 11:50

## 2018-11-06 RX ADMIN — OLANZAPINE 5 MG: 5 TABLET, ORALLY DISINTEGRATING ORAL at 20:47

## 2018-11-06 RX ADMIN — DICYCLOMINE HYDROCHLORIDE 10 MG: 20 TABLET ORAL at 16:58

## 2018-11-06 RX ADMIN — PANTOPRAZOLE SODIUM 20 MG: 20 TABLET, DELAYED RELEASE ORAL at 08:38

## 2018-11-06 RX ADMIN — SUCRALFATE 1 G: 1 TABLET ORAL at 08:39

## 2018-11-06 RX ADMIN — DICYCLOMINE HYDROCHLORIDE 10 MG: 20 TABLET ORAL at 06:32

## 2018-11-06 RX ADMIN — CLONIDINE HYDROCHLORIDE 0.1 MG: 0.1 TABLET ORAL at 14:27

## 2018-11-06 RX ADMIN — TRAZODONE HYDROCHLORIDE 50 MG: 50 TABLET ORAL at 21:53

## 2018-11-06 RX ADMIN — MIRTAZAPINE 30 MG: 30 TABLET, FILM COATED ORAL at 20:47

## 2018-11-06 RX ADMIN — VORTIOXETINE 10 MG: 10 TABLET, FILM COATED ORAL at 08:38

## 2018-11-06 RX ADMIN — PANTOPRAZOLE SODIUM 20 MG: 20 TABLET, DELAYED RELEASE ORAL at 20:48

## 2018-11-06 RX ADMIN — SUCRALFATE 1 G: 1 TABLET ORAL at 20:47

## 2018-11-06 RX ADMIN — SUCRALFATE 1 G: 1 TABLET ORAL at 14:24

## 2018-11-06 RX ADMIN — DIPHENHYDRAMINE HCL 25 MG: 25 TABLET ORAL at 21:53

## 2018-11-06 NOTE — PLAN OF CARE
Problem: Depressive Behavior With or Without Suicide Precautions:  Goal: Ability to disclose and discuss suicidal ideas will improve  Ability to disclose and discuss suicidal ideas will improve    Outcome: Ongoing  PSYCHOEDUCATION GROUP NOTE    Date: 11/6/2018    Start Time: 0900  End Time: 0930    Number Participants in Group:  10/16    Goal:  Patient will demonstrate increased interpersonal interaction   Topic: Community Meeting and Daily Goals     Discipline Responsible:   OT  AT    Ns. x RT MHP Other       Participation Level:     None  Minimal   x Active Listener x Interactive    Monopolizing         Participation Quality:  x Appropriate  Inappropriate   x       Attentive        Intrusive   x       Sharing        Resistant          Supportive        Lethargic       Affective:   x Congruent  Incongruent  Blunted  Flat    Constricted  Anxious  Elated  Angry    Labile  Depressed  Other         Cognitive:  x Alert x Oriented PPTP     Concentration x G  F  P   Attention Span x G  F  P   Short-Term Memory x G  F  P   Long-Term Memory x G  F  P   ProblemSolving/  Decision Making x G  F  P   Ability to Process  Information x G  F  P      Contributing Factors             Delusional             Hallucinating             Flight of Ideas             Other:       Modes of Intervention:  x Education  Support  Exploration   x Clarifying x Problem Solving  Confrontation   x Socialization  Limit Setting x Reality Testing   x Activity  Movement  Media    Other:            Response to Learning:   Able to verbalize current knowledge/experience    Able to verbalize/acknowledge new learning    Able to retain information    Capable of insight    Able to change behavior   x Progressing to goal    Other:        Comments:

## 2018-11-06 NOTE — PLAN OF CARE
Problem: Depressive Behavior With or Without Suicide Precautions:  Goal: Able to verbalize and/or display a decrease in depressive symptoms  Able to verbalize and/or display a decrease in depressive symptoms    Outcome: Ongoing  Psychoeducation Group Note    Date: 11/6/2018  Start Time: 1430  End Time: 1505    Number Participants in Group:  11    Goal:  Patient will demonstrate increased interpersonal interaction   Topic: Recovery/ leisure     Discipline Responsible:   OT  AT  [facility]. x RT  Other       Participation Level:     None  Minimal   x Active Listener  Interactive    Monopolizing         Participation Quality:  x Appropriate  Inappropriate   x       Attentive        Intrusive          Sharing        Resistant          Supportive        Lethargic       Affective:   x Congruent  Incongruent  Blunted  Flat    Constricted  Anxious  Elated  Angry    Labile  Depressed  Other         Cognitive:  x Alert x Oriented PPTP     Concentration  G  F  P   Attention Span  G  F  P   Short-Term Memory  G  F  P   Long-Term Memory  G  F  P   ProblemSolving/  Decision Making  G  F  P   Ability to Process  Information  G  F  P      Contributing Factors             Delusional             Hallucinating             Flight of Ideas             Other:       Modes of Intervention:  x Education x Support x Exploration   x Clarifying x Problem Solving  Confrontation   x Socialization  Limit Setting x Reality Testing   x Activity  Movement  Media    Other:            Response to Learning:   Able to verbalize current knowledge/experience    Able to verbalize/acknowledge new learning    Able to retain information    Capable of insight    Able to change behavior   x Progressing to goal    Other:        Comments: Pt came to group but did not participate

## 2018-11-06 NOTE — PROGRESS NOTES
Department of Psychiatry  Attending Progress Note  Chief Complaint: Severe episode of recurrent major depressive disorder, without psychotic features ([address]     SUBJECTIVE:  Jazzy Day is seen today in the day room. He reports his depression is much less. He is bright and social now. He has been attending groups and spending time in the milieu. He is cooperative and compliant. He reports he would like to be discharged tomorrow.      OBJECTIVE    Physical  /75   Pulse 66   Temp 97.9 °F (36.6 °C) (Oral)   Resp 14   Ht 5' 10\" (1.778 m)   Wt 180 lb (81.6 kg)   SpO2 100%   BMI 25.83 kg/m²      Mental Status Evaluation:  Orientation: alertness: alert   Mood:. dysthymic      Affect:  brighter      Appearance:  age appropriate and disheveled   Activity:  Restless & fidgety   Gait/Posture: Normal   Speech:  normal pitch and normal volume   Thought Process:  concrete   Thought Content:  wnl   Sensorium:  person, place, time/date and situation   Cognition:  grossly intact   Memory: intact   Insight:  fair   Judgment: fair   Suicidal Ideations: Denies SI   Homicidal Ideations: Negative for homicidal ideation      Medication Side Effects: absent       Attention Span attention span and concentration were age appropriate     Medications  Current Facility-Administered Medications   Medication Dose Route Frequency Provider Last Rate Last Dose    dicyclomine (BENTYL) tablet 10 mg  10 mg Oral 4x Daily AC & HS DON Camargo - CNS   10 mg at 11/06/18 1658    ondansetron (ZOFRAN) tablet 4 mg  4 mg Oral Q8H PRN Reed Sera, APRN - CNP        cloNIDine (CATAPRES) tablet 0.1 mg  0.1 mg Oral TID [place] Sera, APRN - CNP   0.1 mg at 11/06/18 1427    loperamide (IMODIUM) capsule 2 mg  2 mg Oral 4x Daily PRN Reed Sera, APRN - CNP        cyclobenzaprine (FLEXERIL) tablet 10 mg  10 mg Oral TID PRN [place] Sera, APRN - CNP        ibuprofen (ADVIL;MOTRIN) tablet 800 mg  800 mg Oral Q8H PRN Reed Sera, APRN - CNP        diphenhydrAMINE

## 2018-11-07 ENCOUNTER — TELEPHONE (OUTPATIENT)
Dept: FAMILY MEDICINE CLINIC | Age: 28
End: 2018-11-07

## 2018-11-07 PROCEDURE — 6370000000 HC RX 637 (ALT 250 FOR IP): Performed by: NURSE PRACTITIONER

## 2018-11-07 PROCEDURE — 6370000000 HC RX 637 (ALT 250 FOR IP): Performed by: REGISTERED NURSE

## 2018-11-07 PROCEDURE — 99239 HOSP IP/OBS DSCHRG MGMT >30: CPT | Performed by: REGISTERED NURSE

## 2018-11-07 PROCEDURE — 5130000000 HC BRIDGE APPOINTMENT

## 2018-11-07 RX ADMIN — DICYCLOMINE HYDROCHLORIDE 10 MG: 20 TABLET ORAL at 06:44

## 2018-11-07 RX ADMIN — SUCRALFATE 1 G: 1 TABLET ORAL at 08:42

## 2018-11-07 RX ADMIN — PRAZOSIN HYDROCHLORIDE 1 MG: 1 CAPSULE ORAL at 08:42

## 2018-11-07 RX ADMIN — PANTOPRAZOLE SODIUM 20 MG: 20 TABLET, DELAYED RELEASE ORAL at 08:42

## 2018-11-07 RX ADMIN — VORTIOXETINE 10 MG: 10 TABLET, FILM COATED ORAL at 08:42

## 2018-11-07 RX ADMIN — CLONIDINE HYDROCHLORIDE 0.1 MG: 0.1 TABLET ORAL at 08:42

## 2018-11-07 NOTE — DISCHARGE SUMMARY
DISCHARGE SUMMARY  Patient ID:  Tanesha Chaves  [id number]  89 y.o.  1990    Admit date: 11/1/2018    Discharge date and time: 11/7/2018  11:30 AM     Admitting Physician: Nick Moraes MD     Discharge Physician:  Romayne Patience, APRN - CNS    Admission Diagnoses: Major depression, recurrent ([facility] [F33.9]    Discharge Diagnoses:   Severe episode of recurrent major depressive disorder, without psychotic features [facility])     Patient Active Problem List   Diagnosis Code    C. difficile colitis A04.72    Nausea and vomiting R11.2    Generalized abdominal pain R10.84    Chronic diarrhea K52.9    Irritable bowel syndrome with both constipation and diarrhea K58.2    Severe episode of recurrent major depressive disorder, without psychotic features ([facility] F33.2    Post traumatic stress disorder F43.10    Epigastric pain R10.13        Admission Condition: poor    Discharged Condition: stable    Indication for Admission: threat to self    History of Present Illnes (present tense wording indicates findings from admission exam on 11/1/2018 and are not necessarily indicative of current findings): Hospital Course:   Upon admission, Tanesha Chaves was provided a safe secure environment, introduced to unit milieu. Patient participated in groups and individual therapies. Meds were adjusted. After few days of hospital care, patient began to feel improvement. Depression lifted, thoughts to harm self ceased. Sleep improved, appetite was good. On morning rounds 11/7/2018, patient endorsed feeling ready for discharge. Patient denies suicidal or homicidal ideations, denies hallucinations or delusions. Denies SE's from meds. It was decided that pt had achieved maximum benefit from hospital care and can be discharged     Consults: GI and internal medicine.       Significant Diagnostic Studies: Routine labs and diagnostics    Treatments: Psychotropic medications, therapy with group, milieu, and 1:1 with nurses, social workers,

## 2018-11-07 NOTE — PLAN OF CARE
Problem: Depressive Behavior With or Without Suicide Precautions:  Goal: Able to verbalize and/or display a decrease in depressive symptoms  Able to verbalize and/or display a decrease in depressive symptoms    Outcome: Ongoing  Patient brightened affect, denies suicidal/homicidal ideations, denies hallucinations, reports low anxiety and depression, looking forward to upcoming discharge tomorrow, states no sleeping or eating issues, pleasant and cooperative

## 2018-11-07 NOTE — CARE COORDINATION
Name: Almita Duff    : 1990    Discharge Date: 2018    Primary Auth/Cert #: [id number]    Discharge Medications:      Medication List      CHANGE how you take these medications    traZODone 50 MG tablet  Commonly known as:  DESYREL  Take 1 tablet by mouth nightly as needed for Sleep  What changed:  · how much to take  · when to take this  · reasons to take this  Notes to patient:  Sleep        CONTINUE taking these medications    clonazePAM 0.5 MG tablet  Commonly known as:  Sharrie Delay  Notes to patient:  Anxiety     dicyclomine 10 MG capsule  Commonly known as:  BENTYL  Take 1 capsule by mouth 4 times daily  Notes to patient:  Stomach cramps     hydrOXYzine 25 MG capsule  Commonly known as:  VISTARIL  Notes to patient:  Anxiety     mirtazapine 30 MG tablet  Commonly known as:  REMERON  Take 1 tablet by mouth nightly  Notes to patient:  Improve mood     OLANZapine zydis 5 MG disintegrating tablet  Commonly known as:  ZYPREXA  Take 1 tablet by mouth nightly  Notes to patient:  Clear thoughts     pantoprazole 20 MG tablet  Commonly known as:  PROTONIX  Take 1 tablet by mouth 2 times daily  Notes to patient:  Reduce stomach acid     prazosin 1 MG capsule  Commonly known as:  MINIPRESS  Take 1 capsule by mouth 2 times daily  Notes to patient:  PTSD     promethazine 25 MG tablet  Commonly known as:  PHENERGAN  Take 1 tablet by mouth every 6 hours as needed for Nausea  Notes to patient:  Nausea     sucralfate 1 GM tablet  Commonly known as:  CARAFATE  Take 1 tablet by mouth 4 times daily  Notes to patient:  Stomach ulcers     VORTIoxetine 10 MG Tabs tablet  Commonly known as:  TRINTELLIX  Take 10 mg by mouth daily  Notes to patient:  Improve mood        STOP taking these medications    citalopram 10 MG tablet  Commonly known as:  CELEXA           Where to Get Your Medications      These medications were sent to [address] [place] [address], [address] [place] [address] [phone] - F

## 2018-11-07 NOTE — BH NOTE
[facility]  Discharge Note    Pt discharged with followings belongings:   Dentures: None  Vision - Corrective Lenses: None  Hearing Aid: None  Jewelry: None  Body Piercings Removed: N/A  Clothing: Pants, Shirt, Footwear  Were All Patient Medications Collected?: Yes  Other Valuables: None   Valuables sent home with patient. Valuables retrieved from safe, Security envelope number:  \[id number]\ and returned to patient. Patient left department with Departure Mode: Family member via Mobility at Departure: Ambulatory, discharged to Discharged to: Private Residence. Patient education on aftercare instructions: completed  Information faxed to [facility] by [facility] ([facility]) LPN Patient verbalize understanding of AVS:  Yes.     Status EXAM upon discharge:  Status and Exam  Normal: Yes  Facial Expression: Brightened  Affect: Appropriate  Level of Consciousness: Alert  Mood:Normal: Yes  Mood: Depressed  Motor Activity:Normal: Yes  Motor Activity: Increased  Interview Behavior: Cooperative  Preception: [place] to Person, [place] to Time, [place] to Place, [place] to Situation  Attention:Normal: Yes  Attention: Distractible  Thought Processes: Circumstantial  Thought Content:Normal: Yes  Thought Content: Preoccupations  Hallucinations: None  Delusions: No  Memory:Normal: Yes  Insight and Judgment: Yes  Insight and Judgment: Poor Insight  Present Suicidal Ideation: No  Present Homicidal Ideation: No    Too Christianson LPN
EVENING GROUP NOTE    Date:  11/6/18 Start Time: 8:00pm  End Time: 8:45pm    Number Participants in Group:  10/14    Goal:  Patient will demonstrate increased interpersonal interaction   Topic:  Wrap Up and Relaxation Groups    Discipline Responsible:   OT  AT   x Nsg.  RT  Other       Participation Level:     None  Minimal   x Active Listener x Interactive    Monopolizing         Participation Quality:  x Appropriate  Inappropriate   x       Attentive        Intrusive   x       Sharing        Resistant   x       Supportive        Lethargic       Affective:   x Congruent  Incongruent  Blunted  Flat    Constricted  Anxious  Elated  Angry    Labile  Depressed  Other         Cognitive:  x Alert  Oriented PPTP     Concentration x G  F  P   Attention Span x G  F  P   Short-Term Memory x G  F  P   Long-Term Memory x G  F  P   ProblemSolving/  Decision Making x G  F  P   Ability to Process  Information x G  F  P      Contributing Factors             Delusional             Hallucinating             Flight of Ideas             Other:       Modes of Intervention:  x Education  Support  Exploration   x Clarifying  Problem Solving  Confrontation    Socialization  Limit Setting  Reality Testing    Activity  Movement  Media    Other:            Response to Learning:  x Able to verbalize current knowledge/experience   x Able to verbalize/acknowledge new learning    Able to retain information    Capable of insight    Able to change behavior    Progressing to goal    Other:        Comments:  Pt attended and participated in  Wrap Up and Relaxation Groups this evening.
[facility] GROUP NOTE    Date: 11/4/18        Start Time: 2100     End Time:2130      Number Participants in Group:5/17    Name of group:Relaxation Group     Discipline Responsible:   OT  AT   x Nsg.  RT MHP Other       Participation Level:     None X Minimal    Active Listener  Interactive    Monopolizing         Participation Quality:   Appropriate  Inappropriate          Attentive        Intrusive          Sharing X       Resistant          Supportive        Lethargic       Affective:    Congruent X Incongruent X Blunted X Flat    Constricted  Anxious  Elated  Angry    Labile  Depressed  Other         Cognitive:  x Alert x Oriented PPTP     Concentration  G X F  P   Attention Span  G  F X P   Short-Term Memory  G X F  P   Long-Term Memory  G X F  P   ProblemSolving/  Decision Making  G X F  P   Ability to Process  Information  G  F X P      Contributing Factors             Delusional             Hallucinating             Flight of Ideas             Other: poor concentration       Modes of Intervention:  x Education x Support x Exploration    Clarifying  Problem Solving  Confrontation   x Socialization  Limit Setting  Reality Testing   x Activity  Movement  Media    Other:            Response to Learning:   Able to verbalize current knowledge/experience    Able to verbalize/acknowledge new learning    Able to retain information    Capable of insight    Able to change behavior    Progressing to goal    Other: MINIMAL INTERACTION,RESISTANT       Comments:
LPN documentation reviewed by RN.
Pt. participated in 4:00 safety check group.
exercise, relaxation techniques, changing routine, distraction)                                                           ( )  Basic information about quitting (benefits of quitting, techniques in how to quit, available resources  ( ) Referral for counseling faxed to [facility]                                           ( ) Patient refused counseling  ( X ) Patient has not smoked in the last 30 days    Metabolic Screening:    Lab Results   Component Value Date    LABA1C 5.2 07/30/2018       No results for input(s): CHOL, TRIG, HDL, LDLCALC, LABVLDL in the last 72 hours. Body mass index is 25.83 kg/m². BP Readings from Last 2 Encounters:   11/01/18 (!) 144/95   09/28/18 (!) 163/88           Pt admitted with followings belongings:  Dentures: None  Vision - Corrective Lenses: None  Hearing Aid: None  Jewelry: None  Body Piercings Removed: N/A  Clothing: Pants, Shirt, Footwear  Were All Patient Medications Collected?: Yes  Other Valuables: None     Valuables placed in safe in security envelope, number:  \[id number]\. Patient's home medications were reviewed by Kely PENA. Patient oriented to surroundings and program expectations and copy of patient rights given. Received admission packet:  yes. Consents reviewed, signed all. Refused none. Patient verbalize understanding:  yes.     Patient education on precautions: yes                   Magdaleno Verdin RN

## 2018-11-13 ENCOUNTER — OFFICE VISIT (OUTPATIENT)
Dept: FAMILY MEDICINE CLINIC | Age: 28
End: 2018-11-13
Payer: MEDICARE

## 2018-11-13 VITALS
DIASTOLIC BLOOD PRESSURE: 64 MMHG | TEMPERATURE: 98.2 F | OXYGEN SATURATION: 98 % | WEIGHT: 194.2 LBS | SYSTOLIC BLOOD PRESSURE: 110 MMHG | BODY MASS INDEX: 27.8 KG/M2 | HEIGHT: 70 IN | HEART RATE: 84 BPM

## 2018-11-13 DIAGNOSIS — F41.0 GENERALIZED ANXIETY DISORDER WITH PANIC ATTACKS: ICD-10-CM

## 2018-11-13 DIAGNOSIS — F41.1 GENERALIZED ANXIETY DISORDER WITH PANIC ATTACKS: ICD-10-CM

## 2018-11-13 DIAGNOSIS — F33.2 SEVERE EPISODE OF RECURRENT MAJOR DEPRESSIVE DISORDER, WITHOUT PSYCHOTIC FEATURES (HCC): Primary | Chronic | ICD-10-CM

## 2018-11-13 PROBLEM — F19.10 DRUG ABUSE (HCC): Status: ACTIVE | Noted: 2018-09-30

## 2018-11-13 PROBLEM — E78.5 HYPERLIPIDEMIA: Status: ACTIVE | Noted: 2018-08-25

## 2018-11-13 PROBLEM — N17.9 ACUTE KIDNEY INJURY (HCC): Status: ACTIVE | Noted: 2018-04-13

## 2018-11-13 PROBLEM — E87.6 HYPOKALEMIA: Status: ACTIVE | Noted: 2018-04-14

## 2018-11-13 PROBLEM — R45.851 SUICIDAL IDEATION: Status: ACTIVE | Noted: 2018-08-25

## 2018-11-13 PROBLEM — F34.1 DYSTHYMIA: Status: ACTIVE | Noted: 2018-09-29

## 2018-11-13 PROBLEM — F41.9 ANXIETY: Status: ACTIVE | Noted: 2018-08-25

## 2018-11-13 PROBLEM — K21.00 GASTROESOPHAGEAL REFLUX DISEASE WITH ESOPHAGITIS: Status: ACTIVE | Noted: 2018-08-25

## 2018-11-13 PROBLEM — F32.9 MDD (MAJOR DEPRESSIVE DISORDER): Status: ACTIVE | Noted: 2018-08-24

## 2018-11-13 PROCEDURE — 1111F DSCHRG MED/CURRENT MED MERGE: CPT | Performed by: FAMILY MEDICINE

## 2018-11-13 PROCEDURE — 99495 TRANSJ CARE MGMT MOD F2F 14D: CPT | Performed by: FAMILY MEDICINE

## 2018-11-13 RX ORDER — PANTOPRAZOLE SODIUM 20 MG/1
20 TABLET, DELAYED RELEASE ORAL 2 TIMES DAILY
Qty: 60 TABLET | Refills: 5 | Status: SHIPPED | OUTPATIENT
Start: 2018-11-13 | End: 2019-04-03 | Stop reason: SDUPTHER

## 2018-11-13 ASSESSMENT — ENCOUNTER SYMPTOMS
SHORTNESS OF BREATH: 1
ABDOMINAL PAIN: 0
NAUSEA: 0
CHEST TIGHTNESS: 0
WHEEZING: 0
COUGH: 0
CONSTIPATION: 0
DIARRHEA: 0

## 2018-11-13 NOTE — PROGRESS NOTES
mirtazapine (REMERON) 30 MG tablet  Take 1 tablet by mouth nightly             OLANZapine zydis (ZYPREXA) 5 MG disintegrating tablet  Take 1 tablet by mouth nightly             pantoprazole (PROTONIX) 20 MG tablet  Take 1 tablet by mouth 2 times daily             prazosin (MINIPRESS) 1 MG capsule  Take 1 capsule by mouth 2 times daily             promethazine (PHENERGAN) 25 MG tablet  Take 1 tablet by mouth every 6 hours as needed for Nausea             sucralfate (CARAFATE) 1 GM tablet  Take 1 tablet by mouth 4 times daily             traZODone (DESYREL) 50 MG tablet  Take 1 tablet by mouth nightly as needed for Sleep             VORTIoxetine (TRINTELLIX) 10 MG TABS tablet  Take 10 mg by mouth daily                   Medications marked \"taking\" at this time  Outpatient Prescriptions Marked as Taking for the 11/13/18 encounter (Office Visit) with Harriet Kellogg MD   Medication Sig Dispense Refill    mirtazapine (REMERON) 30 MG tablet Take 1 tablet by mouth nightly 30 tablet 0    traZODone (DESYREL) 50 MG tablet Take 1 tablet by mouth nightly as needed for Sleep 30 tablet 0    VORTIoxetine (TRINTELLIX) 10 MG TABS tablet Take 10 mg by mouth daily 30 tablet 0    prazosin (MINIPRESS) 1 MG capsule Take 1 capsule by mouth 2 times daily 60 capsule 0    OLANZapine zydis (ZYPREXA) 5 MG disintegrating tablet Take 1 tablet by mouth nightly 30 tablet 0    hydrOXYzine (VISTARIL) 25 MG capsule Take 25 mg by mouth 3 times daily as needed for Anxiety      clonazePAM (KLONOPIN) 0.5 MG tablet Take 0.5 mg by mouth nightly. Albaro Quintana pantoprazole (PROTONIX) 20 MG tablet Take 1 tablet by mouth 2 times daily 60 tablet 5    dicyclomine (BENTYL) 10 MG capsule Take 1 capsule by mouth 4 times daily 120 capsule 3    sucralfate (CARAFATE) 1 GM tablet Take 1 tablet by mouth 4 times daily 120 tablet 5    promethazine (PHENERGAN) 25 MG tablet Take 1 tablet by mouth every 6 hours as needed for Nausea 30 tablet 2        Medications Tympanic   SpO2: 98%   Weight: 194 lb 3.2 oz (88.1 kg)   Height: 5' 10\" (1.778 m)     Body mass index is 27.86 kg/m². Wt Readings from Last 3 Encounters:   11/13/18 194 lb 3.2 oz (88.1 kg)   09/28/18 180 lb (81.6 kg)   09/24/18 178 lb 6.4 oz (80.9 kg)     BP Readings from Last 3 Encounters:   11/13/18 110/64   09/28/18 (!) 163/88   09/27/18 (!) 147/77       Physical Exam   Constitutional: He is oriented to person, place, and time. He appears well-developed and well-nourished. No distress. Eyes: Pupils are equal, round, and reactive to light. Conjunctivae and EOM are normal.   Neck: Normal range of motion. Neck supple. Cardiovascular: Normal rate, regular rhythm, normal heart sounds and intact distal pulses. No murmur heard. Pulmonary/Chest: Effort normal and breath sounds normal. No respiratory distress. He has no wheezes. He has no rales. Musculoskeletal: Normal range of motion. He exhibits no edema. Lymphadenopathy:     He has no cervical adenopathy. Neurological: He is alert and oriented to person, place, and time. Skin: Skin is warm and dry. No rash noted. Nursing note and vitals reviewed. Assessment/Plan:  1. Severe episode of recurrent major depressive disorder, without psychotic features ([address]  Improving; he is doing a little better. He is now motivated to start working out and he is going to go back to work. He is following up with his psychiatrist regularly which I encouraged him to continue to do. 2. Generalized anxiety disorder with panic attacks  Improving; he is feeling much better. He is having less panic attacks and he feels more in control of his life. I encouraged him to keep using the calm [place] because it sounds like it is really helping him. I am incredibly proud of the progress he has made with his counselor and the group therapy in the hospital. I congratulated him on his hard work and reminded him to take baby steps.          Medical Decision Making: moderate

## 2018-11-13 NOTE — PATIENT INSTRUCTIONS
Patient Education        Hemorrhoids: Care Instructions  Your Care Instructions    Hemorrhoids are enlarged veins that develop in the anal canal. Bleeding during bowel movements, itching, swelling, and rectal pain are the most common symptoms. They can be uncomfortable at times, but hemorrhoids rarely are a serious problem. You can treat most hemorrhoids with simple changes to your diet and bowel habits. These changes include eating more fiber and not straining to pass stools. Most hemorrhoids do not need surgery or other treatment unless they are very large and painful or bleed a lot. Follow-up care is a key part of your treatment and safety. Be sure to make and go to all appointments, and call your doctor if you are having problems. It's also a good idea to know your test results and keep a list of the medicines you take. How can you care for yourself at home? · Sit in a few inches of warm water (sitz bath) 3 times a day and after bowel movements. The warm water helps with pain and itching. · Use Tuck's pads as needed for discomfort  · Put ice on your anal area several times a day for 10 minutes at a time. Put a thin cloth between the ice and your skin. Follow this by placing a warm, wet towel on the area for another 10 to 20 minutes. · Take pain medicines exactly as directed. ? If the doctor gave you a prescription medicine for pain, take it as prescribed. ? If you are not taking a prescription pain medicine, ask your doctor if you can take an over-the-counter medicine. · Keep the anal area clean, but be gentle. Use water and a fragrance-free soap, such as [place], or use baby wipes or medicated pads, such as Tucks. · Wear cotton underwear and loose clothing to decrease moisture in the anal area. · Eat more fiber. Include foods such as whole-grain breads and cereals, raw vegetables, raw and dried fruits, and beans.   · Drink plenty of fluids, enough so that your urine is light yellow or clear like

## 2019-01-28 ENCOUNTER — TELEPHONE (OUTPATIENT)
Dept: FAMILY MEDICINE CLINIC | Age: 29
End: 2019-01-28

## 2019-02-04 ENCOUNTER — TELEPHONE (OUTPATIENT)
Dept: FAMILY MEDICINE CLINIC | Age: 29
End: 2019-02-04

## 2019-02-05 PROBLEM — F33.3 MDD (MAJOR DEPRESSIVE DISORDER), RECURRENT, SEVERE, WITH PSYCHOSIS (HCC): Status: ACTIVE | Noted: 2019-01-29

## 2019-03-13 NOTE — DISCHARGE INSTR - ACTIVITY
PULMONARY AND CRITICAL CARE PROGRESS NOTE - [facility]    Patient: Bayron Rodriguez  1957   MR# [id number]   Acct# [id number]  03/13/19   7:49 AM  Referring Provider: Kamar Joy MD    Chief Complaint: Shortness of breath    Interval history: He is presently sleeping.  He appears to be breathing comfortably on room air.  No issues overnight.  No fever.  No family at the bedside.    Meds:    acetylcysteine 1.5 mL Nebulization Q8H - RT   amLODIPine 5 mg Oral QAM   atorvastatin 20 mg Oral Nightly   doxycycline 100 mg Intravenous Q12H   folic acid 1 mg Oral QAM   guaiFENesin 1,200 mg Oral Q12H   insulin lispro 2-7 Units Subcutaneous 4x Daily With Meals & Nightly   ipratropium-albuterol 3 mL Nebulization Q4H - RT   melatonin 3 mg Oral Nightly   methylPREDNISolone sodium succinate 40 mg Intravenous Q12H   metoprolol tartrate 100 mg Oral Q12H   mycophenolate 540 mg Oral Q12H   pantoprazole 40 mg Oral QAM   piperacillin-tazobactam 3.375 g Intravenous Q6H   pregabalin 150 mg Oral Q12H   sodium chloride 3 mL Intravenous Q12H   tacrolimus 0.5 mg Oral Nightly   tacrolimus 1 mg Oral QAM   vitamin B-12 500 mcg Oral QAM        Review of Systems:   Review of Systems:    Constitutional: Positive for activity change, chills, fatigue and fever.   HENT: Positive for congestion. Negative for nosebleeds, rhinorrhea, sinus pressure, sore throat and trouble swallowing.    Eyes: Negative for pain, discharge and itching.   Respiratory: Positive for cough, chest tightness, shortness of breath and wheezing. Negative for apnea.    Cardiovascular: Negative for chest pain, palpitations and leg swelling.   Gastrointestinal: Negative for abdominal distention, constipation, diarrhea, nausea and vomiting.   Endocrine: Negative for polydipsia, polyphagia and polyuria.   Genitourinary: Negative for difficulty urinating, hematuria and urgency.   Musculoskeletal: Negative for arthralgias, back pain, gait problem and joint  As tolerated swelling.   Skin: Negative for color change, pallor and rash.   Neurological: Negative for dizziness, speech difficulty, weakness and numbness.   Hematological: Negative for adenopathy. Does not bruise/bleed easily.   Psychiatric/Behavioral: Negative for agitation and confusion. The patient is not nervous/anxious.    All other systems reviewed and are negative.    Physical Exam:  SpO2 Percentage    03/13/19 0358 03/13/19 0635 03/13/19 0737   SpO2: 93% 97% 92%     Temp:  [97.6 °F (36.4 °C)-98 °F (36.7 °C)] 97.8 °F (36.6 °C)  Heart Rate:  [] 90  Resp:  [18-20] 18  BP: (130-147)/(62-79) 144/68    Intake/Output Summary (Last 24 hours) at 3/13/2019 0749  Last data filed at 3/13/2019 0448  Gross per 24 hour   Intake 1360 ml   Output --   Net 1360 ml     Physical Exam :    Constitutional: He is oriented to person, place, and time. He appears well-developed and well-nourished. No distress.   HENT:   Head: Normocephalic and atraumatic.   Right Ear: External ear normal.   Left Ear: External ear normal.   Nose: Nose normal.   Mouth/Throat: Oropharynx is clear and moist. No oropharyngeal exudate.   Eyes: Conjunctivae and EOM are normal. Pupils are equal, round, and reactive to light. Right eye exhibits no discharge. Left eye exhibits no discharge.   Neck: Normal range of motion. Neck supple. No JVD present.   Cardiovascular: Normal rate and regular rhythm.   No murmur heard.  Pulmonary/Chest: He has decreased breath sounds in the right upper field, the right middle field and the right lower field. He has rhonchi in the left upper field and the left lower field.   Abdominal: Soft. Bowel sounds are normal. He exhibits no distension.   Musculoskeletal: Normal range of motion. He exhibits no edema or deformity.   Neurological: He is alert and oriented to person, place, and time. He displays normal reflexes. No cranial nerve deficit. Coordination normal.   Skin: Skin is warm and dry. No rash noted. He is not diaphoretic. No  erythema.   Psychiatric: He has a normal mood and affect. His behavior is normal. Thought content normal.   Nursing note and vitals reviewed    Laboratory Data:  Results from last 7 days   Lab Units 03/13/19  0452 03/12/19  0639 03/11/19  0313   WBC 10*3/mm3 18.19* 16.88* 13.06*   HEMOGLOBIN g/dL 12.4* 13.3* 13.3*   PLATELETS 10*3/mm3 220 215 187     Results from last 7 days   Lab Units 03/13/19  0452 03/12/19  0639 03/11/19  0313   SODIUM mmol/L 137 138 136   POTASSIUM mmol/L 3.6 3.6 3.8   BUN mg/dL 36* 35* 31*   CREATININE mg/dL 1.15 1.29 1.29     Results from last 7 days   Lab Units 03/11/19  0520 03/10/19  1853   PH, ARTERIAL pH units 7.419 7.453*   PCO2, ARTERIAL mm Hg 44.3 43.0   PO2 ART mm Hg 56.4* 60.8*     Blood Culture   Date Value Ref Range Status   03/10/2019 No growth at 24 hours  Preliminary   03/10/2019 No growth at 24 hours  Preliminary     Respiratory Culture   Date Value Ref Range Status   03/10/2019 Heavy growth (4+) Gram Negative Bacilli (A)  Preliminary   03/10/2019 Heavy growth (4+) Normal Respiratory Mahsa  Preliminary     Recent films:  No radiology results for the last day  Films reviewed personally by me.  My interpretation: None today    Pulmonary Assessment:    1. COPD exacerbation  2. Acute on chronic renal failure  3. Unilateral lung transplant history  4. Chronic immunosuppressive state  5. Anemia   6. Questionable aspiration    Recommend:     · Okay to home from pulmonary standpoint   · IV steroid dosing reduced.  Will need to go home on a prednisone taper   · Should be able to switch to oral Levaquin to cover Pseudomonas.  Recommend a full 10-day course  · Continue oxygen at home  · We will work on home chest physiotherapy outpatient        Electronically signed by ENRICO Ovalles, 03/13/19, 7:49 AM     Physician substantive portion:  Pt feels somewhat better but reports severe episode of dyspnea acutely after receiving a dose of mucomyst last evening.  Exam shows diminished  breath sounds bilaterally, no accessory muscle use today and growth of pseudomonas on resp culture.  Quinolones can reduce effectiveness of MMF but in his case treatment of pseudomonas is critical and he has not been rejection prone so I recommend completing antibiotic course with that.  Pt needs mucus clearance therapy.  He will take home the aerobika which has given him some mild benefit but since he has had much more significant benefit from the metaneb device, I think he would benefit much more from percussive vest therapy, which could reduce risk for recurrent infection/pneumonia/mucus plugging in this pt with chronic lung disease and now pseudomonas infection.    I have seen and examined patient personally, performing a face-to-face diagnostic evaluation with plan of care reviewed and developed with APRN and nursing staff. I have addended and/or modified the above history of present illness, physical examination, and assessment and plan to reflect my findings and impressions. Essential elements of the care plan were discussed with APRN above.  Agree with findings and assessment/plan as documented above.    Electronically signed by Erik Richardson MD, on 3/13/2019, 8:51 AM     Ok to home from pulmonary standpoint.  Concomitant use of quinolone antibiotics can reduce

## 2019-03-22 ENCOUNTER — HOSPITAL ENCOUNTER (EMERGENCY)
Age: 29
Discharge: PSYCHIATRIC OR SUBSTANCE USE FACILITY | End: 2019-03-22
Attending: EMERGENCY MEDICINE
Payer: MEDICARE

## 2019-03-22 ENCOUNTER — TELEPHONE (OUTPATIENT)
Dept: PRIMARY CARE CLINIC | Age: 29
End: 2019-03-22

## 2019-03-22 VITALS
DIASTOLIC BLOOD PRESSURE: 95 MMHG | RESPIRATION RATE: 18 BRPM | OXYGEN SATURATION: 96 % | SYSTOLIC BLOOD PRESSURE: 151 MMHG | HEART RATE: 115 BPM | TEMPERATURE: 99.7 F

## 2019-03-22 DIAGNOSIS — R45.851 SUICIDAL IDEATION: Primary | ICD-10-CM

## 2019-03-22 DIAGNOSIS — F41.9 SEVERE ANXIETY: ICD-10-CM

## 2019-03-22 LAB
-: ABNORMAL
ABSOLUTE EOS #: 0 K/UL (ref 0–0.4)
ABSOLUTE IMMATURE GRANULOCYTE: ABNORMAL K/UL (ref 0–0.3)
ABSOLUTE LYMPH #: 3.1 K/UL (ref 1–4.8)
ABSOLUTE MONO #: 1.3 K/UL (ref 0.1–1.2)
ALBUMIN SERPL-MCNC: 5.1 G/DL (ref 3.5–5.2)
ALBUMIN/GLOBULIN RATIO: 1.5 (ref 1–2.5)
ALP BLD-CCNC: 90 U/L (ref 40–129)
ALT SERPL-CCNC: 36 U/L (ref 5–41)
AMORPHOUS: ABNORMAL
AMPHETAMINE SCREEN URINE: NEGATIVE
ANION GAP SERPL CALCULATED.3IONS-SCNC: 19 MMOL/L (ref 9–17)
AST SERPL-CCNC: 23 U/L
BACTERIA: ABNORMAL
BARBITURATE SCREEN URINE: NEGATIVE
BASOPHILS # BLD: 1 % (ref 0–2)
BASOPHILS ABSOLUTE: 0.1 K/UL (ref 0–0.2)
BENZODIAZEPINE SCREEN, URINE: POSITIVE
BILIRUB SERPL-MCNC: 0.95 MG/DL (ref 0.3–1.2)
BILIRUBIN URINE: NEGATIVE
BUN BLDV-MCNC: 21 MG/DL (ref 6–20)
BUN/CREAT BLD: 18 (ref 9–20)
BUPRENORPHINE URINE: NEGATIVE
CALCIUM SERPL-MCNC: 10.5 MG/DL (ref 8.6–10.4)
CANNABINOID SCREEN URINE: POSITIVE
CASTS UA: ABNORMAL /LPF (ref 0–2)
CHLORIDE BLD-SCNC: 98 MMOL/L (ref 98–107)
CO2: 20 MMOL/L (ref 20–31)
COCAINE METABOLITE, URINE: NEGATIVE
COLOR: ABNORMAL
COMMENT UA: ABNORMAL
CREAT SERPL-MCNC: 1.16 MG/DL (ref 0.7–1.2)
CRYSTALS, UA: ABNORMAL /HPF
DIFFERENTIAL TYPE: ABNORMAL
EOSINOPHILS RELATIVE PERCENT: 0 % (ref 1–8)
EPITHELIAL CELLS UA: ABNORMAL /HPF (ref 0–5)
ETHANOL PERCENT: NORMAL %
ETHANOL: <10 MG/DL
GFR AFRICAN AMERICAN: >60 ML/MIN
GFR NON-AFRICAN AMERICAN: >60 ML/MIN
GFR SERPL CREATININE-BSD FRML MDRD: ABNORMAL ML/MIN/{1.73_M2}
GFR SERPL CREATININE-BSD FRML MDRD: ABNORMAL ML/MIN/{1.73_M2}
GLUCOSE BLD-MCNC: 131 MG/DL (ref 70–99)
GLUCOSE URINE: NEGATIVE
HCT VFR BLD CALC: 46.8 % (ref 41–53)
HEMOGLOBIN: 15.4 G/DL (ref 13.5–17.5)
IMMATURE GRANULOCYTES: ABNORMAL %
KETONES, URINE: NEGATIVE
LEUKOCYTE ESTERASE, URINE: NEGATIVE
LYMPHOCYTES # BLD: 18 % (ref 15–43)
MCH RBC QN AUTO: 29.4 PG (ref 26–34)
MCHC RBC AUTO-ENTMCNC: 33 G/DL (ref 31–37)
MCV RBC AUTO: 89.1 FL (ref 80–100)
MDMA URINE: ABNORMAL
METHADONE SCREEN, URINE: NEGATIVE
METHAMPHETAMINE, URINE: NEGATIVE
MONOCYTES # BLD: 7 % (ref 6–14)
MUCUS: ABNORMAL
NITRITE, URINE: NEGATIVE
NRBC AUTOMATED: ABNORMAL PER 100 WBC
OPIATES, URINE: NEGATIVE
OTHER OBSERVATIONS UA: ABNORMAL
OXYCODONE SCREEN URINE: NEGATIVE
PDW BLD-RTO: 14.4 % (ref 11–14.5)
PH UA: 5.5 (ref 5–6)
PHENCYCLIDINE, URINE: NEGATIVE
PLATELET # BLD: 466 K/UL (ref 140–450)
PLATELET ESTIMATE: ABNORMAL
PMV BLD AUTO: 7.2 FL (ref 6–12)
POTASSIUM SERPL-SCNC: 3.8 MMOL/L (ref 3.7–5.3)
PROPOXYPHENE, URINE: NEGATIVE
PROTEIN UA: ABNORMAL
RBC # BLD: 5.25 M/UL (ref 4.5–5.9)
RBC # BLD: ABNORMAL 10*6/UL
RBC UA: ABNORMAL /HPF (ref 0–4)
RENAL EPITHELIAL, UA: ABNORMAL /HPF
SEG NEUTROPHILS: 74 % (ref 44–74)
SEGMENTED NEUTROPHILS ABSOLUTE COUNT: 12.7 K/UL (ref 1.8–7.7)
SODIUM BLD-SCNC: 137 MMOL/L (ref 135–144)
SPECIFIC GRAVITY UA: 1.02 (ref 1.01–1.02)
TEST INFORMATION: ABNORMAL
TOTAL PROTEIN: 8.6 G/DL (ref 6.4–8.3)
TRICHOMONAS: ABNORMAL
TRICYCLIC ANTIDEPRESSANTS, UR: NEGATIVE
TSH SERPL DL<=0.05 MIU/L-ACNC: 0.96 MIU/L (ref 0.3–5)
TURBIDITY: ABNORMAL
URINE HGB: NEGATIVE
UROBILINOGEN, URINE: NORMAL
WBC # BLD: 17.2 K/UL (ref 3.5–11)
WBC # BLD: ABNORMAL 10*3/UL
WBC UA: ABNORMAL /HPF (ref 0–4)
YEAST: ABNORMAL

## 2019-03-22 PROCEDURE — 6360000002 HC RX W HCPCS

## 2019-03-22 PROCEDURE — 81001 URINALYSIS AUTO W/SCOPE: CPT

## 2019-03-22 PROCEDURE — 99285 EMERGENCY DEPT VISIT HI MDM: CPT

## 2019-03-22 PROCEDURE — 84443 ASSAY THYROID STIM HORMONE: CPT

## 2019-03-22 PROCEDURE — 80053 COMPREHEN METABOLIC PANEL: CPT

## 2019-03-22 PROCEDURE — 96375 TX/PRO/DX INJ NEW DRUG ADDON: CPT

## 2019-03-22 PROCEDURE — 96374 THER/PROPH/DIAG INJ IV PUSH: CPT

## 2019-03-22 PROCEDURE — G0480 DRUG TEST DEF 1-7 CLASSES: HCPCS

## 2019-03-22 PROCEDURE — 6360000002 HC RX W HCPCS: Performed by: EMERGENCY MEDICINE

## 2019-03-22 PROCEDURE — 80306 DRUG TEST PRSMV INSTRMNT: CPT

## 2019-03-22 PROCEDURE — 85025 COMPLETE CBC W/AUTO DIFF WBC: CPT

## 2019-03-22 PROCEDURE — 96376 TX/PRO/DX INJ SAME DRUG ADON: CPT

## 2019-03-22 RX ORDER — LORAZEPAM 2 MG/ML
1 INJECTION INTRAMUSCULAR ONCE
Status: COMPLETED | OUTPATIENT
Start: 2019-03-22 | End: 2019-03-22

## 2019-03-22 RX ORDER — DIPHENHYDRAMINE HYDROCHLORIDE 50 MG/ML
25 INJECTION INTRAMUSCULAR; INTRAVENOUS ONCE
Status: COMPLETED | OUTPATIENT
Start: 2019-03-22 | End: 2019-03-22

## 2019-03-22 RX ORDER — ONDANSETRON 2 MG/ML
4 INJECTION INTRAMUSCULAR; INTRAVENOUS ONCE
Status: COMPLETED | OUTPATIENT
Start: 2019-03-22 | End: 2019-03-22

## 2019-03-22 RX ORDER — LORAZEPAM 2 MG/ML
INJECTION INTRAMUSCULAR
Status: COMPLETED
Start: 2019-03-22 | End: 2019-03-22

## 2019-03-22 RX ORDER — METOCLOPRAMIDE HYDROCHLORIDE 5 MG/ML
10 INJECTION INTRAMUSCULAR; INTRAVENOUS ONCE
Status: COMPLETED | OUTPATIENT
Start: 2019-03-22 | End: 2019-03-22

## 2019-03-22 RX ORDER — HYDROXYZINE 50 MG/1
50 TABLET, FILM COATED ORAL 3 TIMES DAILY PRN
COMMUNITY
End: 2019-07-02 | Stop reason: SDUPTHER

## 2019-03-22 RX ORDER — DESVENLAFAXINE 50 MG/1
50 TABLET, EXTENDED RELEASE ORAL DAILY
Status: ON HOLD | COMMUNITY
End: 2019-05-17 | Stop reason: HOSPADM

## 2019-03-22 RX ORDER — CITALOPRAM 40 MG/1
40 TABLET ORAL DAILY
COMMUNITY
End: 2019-08-19

## 2019-03-22 RX ADMIN — LORAZEPAM 1 MG: 2 INJECTION INTRAMUSCULAR; INTRAVENOUS at 12:39

## 2019-03-22 RX ADMIN — DIPHENHYDRAMINE HYDROCHLORIDE 25 MG: 50 INJECTION, SOLUTION INTRAMUSCULAR; INTRAVENOUS at 10:11

## 2019-03-22 RX ADMIN — LORAZEPAM 1 MG: 2 INJECTION INTRAMUSCULAR; INTRAVENOUS at 10:09

## 2019-03-22 RX ADMIN — ONDANSETRON 4 MG: 2 INJECTION INTRAMUSCULAR; INTRAVENOUS at 12:37

## 2019-03-22 RX ADMIN — LORAZEPAM 1 MG: 2 INJECTION INTRAMUSCULAR; INTRAVENOUS at 12:48

## 2019-03-22 RX ADMIN — METOCLOPRAMIDE 10 MG: 5 INJECTION, SOLUTION INTRAMUSCULAR; INTRAVENOUS at 10:10

## 2019-03-22 ASSESSMENT — ENCOUNTER SYMPTOMS: VOMITING: 0

## 2019-03-22 ASSESSMENT — PAIN DESCRIPTION - LOCATION: LOCATION: ABDOMEN;HEAD

## 2019-03-22 ASSESSMENT — PAIN SCALES - GENERAL: PAINLEVEL_OUTOF10: 5

## 2019-03-26 ENCOUNTER — TELEPHONE (OUTPATIENT)
Dept: FAMILY MEDICINE CLINIC | Age: 29
End: 2019-03-26

## 2019-04-03 ENCOUNTER — OFFICE VISIT (OUTPATIENT)
Dept: FAMILY MEDICINE CLINIC | Age: 29
End: 2019-04-03
Payer: MEDICARE

## 2019-04-03 ENCOUNTER — HOSPITAL ENCOUNTER (OUTPATIENT)
Dept: LAB | Age: 29
Discharge: HOME | End: 2019-04-03
Payer: MEDICARE

## 2019-04-03 VITALS
WEIGHT: 205.8 LBS | HEART RATE: 86 BPM | SYSTOLIC BLOOD PRESSURE: 110 MMHG | TEMPERATURE: 97.6 F | OXYGEN SATURATION: 96 % | BODY MASS INDEX: 29.46 KG/M2 | DIASTOLIC BLOOD PRESSURE: 78 MMHG | HEIGHT: 70 IN

## 2019-04-03 DIAGNOSIS — D72.829 LEUKOCYTOSIS, UNSPECIFIED TYPE: Primary | ICD-10-CM

## 2019-04-03 DIAGNOSIS — H92.01 RIGHT EAR PAIN: ICD-10-CM

## 2019-04-03 DIAGNOSIS — F41.9 ANXIETY: ICD-10-CM

## 2019-04-03 PROBLEM — E83.52 HYPERCALCEMIA: Status: ACTIVE | Noted: 2019-03-23

## 2019-04-03 LAB
ALBUMIN SERPL-MCNC: 4.3 G/DL (ref 3.5–5.2)
ALBUMIN/GLOBULIN RATIO: 1.5 (ref 1–2.5)
ALP BLD-CCNC: 86 U/L (ref 40–129)
ALT SERPL-CCNC: 26 U/L (ref 5–41)
ANION GAP SERPL CALCULATED.3IONS-SCNC: 10 MMOL/L (ref 9–17)
AST SERPL-CCNC: 14 U/L
BILIRUB SERPL-MCNC: 0.14 MG/DL (ref 0.3–1.2)
BUN BLDV-MCNC: 15 MG/DL (ref 6–20)
BUN/CREAT BLD: 19 (ref 9–20)
CALCIUM SERPL-MCNC: 9.3 MG/DL (ref 8.6–10.4)
CHLORIDE BLD-SCNC: 108 MMOL/L (ref 98–107)
CHOLESTEROL/HDL RATIO: 5.4
CHOLESTEROL: 200 MG/DL
CO2: 25 MMOL/L (ref 20–31)
CREAT SERPL-MCNC: 0.77 MG/DL (ref 0.7–1.2)
ESTIMATED AVERAGE GLUCOSE: 105 MG/DL
GFR AFRICAN AMERICAN: >60 ML/MIN
GFR NON-AFRICAN AMERICAN: >60 ML/MIN
GFR SERPL CREATININE-BSD FRML MDRD: ABNORMAL ML/MIN/{1.73_M2}
GFR SERPL CREATININE-BSD FRML MDRD: ABNORMAL ML/MIN/{1.73_M2}
GLUCOSE BLD-MCNC: 118 MG/DL (ref 70–99)
HBA1C MFR BLD: 5.3 % (ref 4.8–5.9)
HDLC SERPL-MCNC: 37 MG/DL
LDL CHOLESTEROL: 121 MG/DL (ref 0–130)
POTASSIUM SERPL-SCNC: 4.3 MMOL/L (ref 3.7–5.3)
PROLACTIN: 16.99 UG/L (ref 4.04–15.2)
SODIUM BLD-SCNC: 143 MMOL/L (ref 135–144)
THYROXINE, FREE: 1.1 NG/DL (ref 0.93–1.7)
TOTAL PROTEIN: 7.1 G/DL (ref 6.4–8.3)
TRIGL SERPL-MCNC: 210 MG/DL
TSH SERPL DL<=0.05 MIU/L-ACNC: 0.27 MIU/L (ref 0.3–5)
VITAMIN B-12: 356 PG/ML (ref 232–1245)
VITAMIN D 25-HYDROXY: 14.1 NG/ML (ref 30–100)
VLDLC SERPL CALC-MCNC: ABNORMAL MG/DL (ref 1–30)

## 2019-04-03 PROCEDURE — 80061 LIPID PANEL: CPT

## 2019-04-03 PROCEDURE — 84443 ASSAY THYROID STIM HORMONE: CPT

## 2019-04-03 PROCEDURE — 36415 COLL VENOUS BLD VENIPUNCTURE: CPT

## 2019-04-03 PROCEDURE — 82306 VITAMIN D 25 HYDROXY: CPT

## 2019-04-03 PROCEDURE — 84439 ASSAY OF FREE THYROXINE: CPT

## 2019-04-03 PROCEDURE — 82607 VITAMIN B-12: CPT

## 2019-04-03 PROCEDURE — 80053 COMPREHEN METABOLIC PANEL: CPT

## 2019-04-03 PROCEDURE — 84146 ASSAY OF PROLACTIN: CPT

## 2019-04-03 PROCEDURE — 1111F DSCHRG MED/CURRENT MED MERGE: CPT | Performed by: FAMILY MEDICINE

## 2019-04-03 PROCEDURE — 83036 HEMOGLOBIN GLYCOSYLATED A1C: CPT

## 2019-04-03 PROCEDURE — 99214 OFFICE O/P EST MOD 30 MIN: CPT | Performed by: FAMILY MEDICINE

## 2019-04-03 RX ORDER — PROMETHAZINE HYDROCHLORIDE 25 MG/1
25 TABLET ORAL EVERY 6 HOURS PRN
Qty: 30 TABLET | Refills: 2 | Status: SHIPPED | OUTPATIENT
Start: 2019-04-03 | End: 2020-02-13

## 2019-04-03 RX ORDER — PANTOPRAZOLE SODIUM 20 MG/1
20 TABLET, DELAYED RELEASE ORAL 2 TIMES DAILY
Qty: 60 TABLET | Refills: 5 | Status: SHIPPED | OUTPATIENT
Start: 2019-04-03 | End: 2019-06-06 | Stop reason: DRUGHIGH

## 2019-04-03 RX ORDER — DESVENLAFAXINE 50 MG/1
50 TABLET, EXTENDED RELEASE ORAL
Status: ON HOLD | COMMUNITY
Start: 2019-03-27 | End: 2019-05-17 | Stop reason: HOSPADM

## 2019-04-03 RX ORDER — PROPRANOLOL HYDROCHLORIDE 10 MG/1
10 TABLET ORAL DAILY
COMMUNITY
End: 2019-04-05 | Stop reason: SDUPTHER

## 2019-04-03 RX ORDER — DICYCLOMINE HYDROCHLORIDE 10 MG/1
10 CAPSULE ORAL 4 TIMES DAILY
Qty: 120 CAPSULE | Refills: 3 | Status: SHIPPED | OUTPATIENT
Start: 2019-04-03 | End: 2019-07-02 | Stop reason: SDUPTHER

## 2019-04-03 RX ORDER — SUCRALFATE 1 G/1
1 TABLET ORAL 4 TIMES DAILY
Qty: 120 TABLET | Refills: 5 | Status: SHIPPED | OUTPATIENT
Start: 2019-04-03 | End: 2020-10-28 | Stop reason: SDUPTHER

## 2019-04-03 RX ORDER — FLUTICASONE PROPIONATE 50 MCG
1 SPRAY, SUSPENSION (ML) NASAL DAILY
Qty: 1 BOTTLE | Refills: 3 | Status: ON HOLD | OUTPATIENT
Start: 2019-04-03 | End: 2020-11-18 | Stop reason: HOSPADM

## 2019-04-03 ASSESSMENT — ENCOUNTER SYMPTOMS
CHEST TIGHTNESS: 0
NAUSEA: 0
CONSTIPATION: 0
COUGH: 0
DIARRHEA: 0
SHORTNESS OF BREATH: 0
WHEEZING: 0
ABDOMINAL PAIN: 0

## 2019-04-03 NOTE — PROGRESS NOTES
release tablet  Take 50 mg by mouth             dicyclomine (BENTYL) 10 MG capsule  Take 1 capsule by mouth 4 times daily             fluticasone (FLONASE) 50 MCG/ACT nasal spray  1 spray by Each Nare route daily             hydrOXYzine (ATARAX) 50 MG tablet  Take 50 mg by mouth 3 times daily as needed for Itching             mirtazapine (REMERON) 30 MG tablet  Take 1 tablet by mouth nightly             OLANZapine zydis (ZYPREXA) 5 MG disintegrating tablet  Take 1 tablet by mouth nightly             pantoprazole (PROTONIX) 20 MG tablet  Take 1 tablet by mouth 2 times daily             prazosin (MINIPRESS) 1 MG capsule  Take 1 capsule by mouth 2 times daily             promethazine (PHENERGAN) 25 MG tablet  Take 1 tablet by mouth every 6 hours as needed for Nausea             propranolol (INDERAL) 10 MG tablet  Take 10 mg by mouth daily             sucralfate (CARAFATE) 1 GM tablet  Take 1 tablet by mouth 4 times daily             traZODone (DESYREL) 50 MG tablet  Take 1 tablet by mouth nightly as needed for Sleep                   Medications marked \"taking\" at this time  Outpatient Medications Marked as Taking for the 4/3/19 encounter (Office Visit) with Hayley Davis MD   Medication Sig Dispense Refill    desvenlafaxine succinate (PRISTIQ) 50 MG TB24 extended release tablet Take 50 mg by mouth      propranolol (INDERAL) 10 MG tablet Take 10 mg by mouth daily      fluticasone (FLONASE) 50 MCG/ACT nasal spray 1 spray by Each Nare route daily 1 Bottle 3    desvenlafaxine (KHEDEZLA) 50 MG TB24 extended release tablet Take 50 mg by mouth daily      citalopram (CELEXA) 40 MG tablet Take 40 mg by mouth daily      hydrOXYzine (ATARAX) 50 MG tablet Take 50 mg by mouth 3 times daily as needed for Itching      pantoprazole (PROTONIX) 20 MG tablet Take 1 tablet by mouth 2 times daily 60 tablet 5    mirtazapine (REMERON) 30 MG tablet Take 1 tablet by mouth nightly 30 tablet 0    traZODone (DESYREL) 50 MG tablet Take 1 tablet by mouth nightly as needed for Sleep 30 tablet 0    prazosin (MINIPRESS) 1 MG capsule Take 1 capsule by mouth 2 times daily 60 capsule 0    OLANZapine zydis (ZYPREXA) 5 MG disintegrating tablet Take 1 tablet by mouth nightly 30 tablet 0    clonazePAM (KLONOPIN) 0.5 MG tablet Take 1 mg by mouth nightly.  dicyclomine (BENTYL) 10 MG capsule Take 1 capsule by mouth 4 times daily 120 capsule 3    sucralfate (CARAFATE) 1 GM tablet Take 1 tablet by mouth 4 times daily 120 tablet 5    promethazine (PHENERGAN) 25 MG tablet Take 1 tablet by mouth every 6 hours as needed for Nausea 30 tablet 2        Medications patient taking as of now reconciled against medications ordered at time of hospital discharge: Yes    Chief Complaint   Patient presents with    Follow-Up from CLAUDIA AGUIRRE  S[facility]     [facility]- [place] 3/27/19 dx anxiety, depression, elevated WBC    Otalgia     right ear pain couple danette - thinks he might have an ear infections        HPI Here today for a follow up from the [facility]. [facility] the [facility] on [facility] and discharged on 3/26. He was admitted with severe anxiety and suicidal ideation. He is doing well. He has been eating well. He got a treadmill to start exercising to he wants to start playing basketball again. He switched his psychiatrist because he was doing well and didn't want his meds changed. He will start counseling in a few weeks. He has the calm [place] as well which he finds has really helped him. His abdominal pain has improved as well. He had previously been smoking a lot of marijuana. He now has his medical marijuana card and the oil has really helped him. He uses the oil when he is feeling anxious and needs to help himself calm down. Right ear pain: new; he has had pain in his ear off and on for the past month and it is giving him a headache. No hearing loss. He is not getting any drainage out of his ear. He has not had any recent fevers.  He has had some nasal congestion recently. Inpatient course: Discharge summary reviewed- see chart. Interval history/Current status: improving    Review of Systems   Constitutional: Negative for activity change, appetite change, fatigue and fever. HENT: Positive for congestion and ear pain (right). Respiratory: Negative for cough, chest tightness, shortness of breath (during panic attacks) and wheezing. Cardiovascular: Negative for chest pain, palpitations (during panic attacks) and leg swelling. Gastrointestinal: Negative for abdominal pain, constipation, diarrhea and nausea. Skin: Negative for rash. Neurological: Negative for syncope and headaches. Psychiatric/Behavioral: Negative for agitation, dysphoric mood, self-injury, sleep disturbance and suicidal ideas. The patient is not nervous/anxious. Vitals:    04/03/19 1131   BP: 110/78   Site: Left Upper Arm   Position: Sitting   Cuff Size: Medium Adult   Pulse: 86   Temp: 97.6 °F (36.4 °C)   TempSrc: Tympanic   SpO2: 96%   Weight: 205 lb 12.8 oz (93.4 kg)   Height: 5' 10\" (1.778 m)     Body mass index is 29.53 kg/m². Wt Readings from Last 3 Encounters:   04/03/19 205 lb 12.8 oz (93.4 kg)   11/13/18 194 lb 3.2 oz (88.1 kg)   09/28/18 180 lb (81.6 kg)     BP Readings from Last 3 Encounters:   04/03/19 110/78   03/22/19 (!) 151/95   11/13/18 110/64       Physical Exam   Constitutional: He is oriented to person, place, and time. He appears well-developed and well-nourished. No distress. HENT:   Right Ear: Hearing, tympanic membrane, external ear and ear canal normal.   Left Ear: Hearing, tympanic membrane, external ear and ear canal normal.   Eyes: Pupils are equal, round, and reactive to light. Conjunctivae and EOM are normal.   Neck: Normal range of motion. Neck supple. Cardiovascular: Normal rate, regular rhythm, normal heart sounds and intact distal pulses. No murmur heard.   Pulmonary/Chest: Effort normal and breath sounds normal. No respiratory distress. He has no wheezes. He has no rales. Musculoskeletal: Normal range of motion. He exhibits no edema. Lymphadenopathy:     He has no cervical adenopathy. Neurological: He is alert and oriented to person, place, and time. Skin: Skin is warm and dry. No rash noted. Nursing note and vitals reviewed. Assessment/Plan:  1. Leukocytosis, unspecified type  Stable; this has been seen consistently when he is in the [facility]. I received a note about it after his most recent admission suggesting further work up. I agree it needs evaluated because his WBC has been consistently high for almost a year. I will refer to hematology for further recommendations. - Bao Ayon MD, Hematology/Oncology, [facility] CURRENT OUTPATIENT MED LIST    2. Anxiety  Improving; he is much better today than I have ever seen him. I encouraged him to continue with his counseling and to follow up with his psychiatrist.     - [address] MED LIST    3. Right ear pain  New; seems to be due to congestion. I recommended flonase to help ease the pressure.         Medical Decision Making: moderate complexity    Maria Esther Sanders MD  4/3/2019  1:59 PM

## 2019-04-05 RX ORDER — PROPRANOLOL HYDROCHLORIDE 10 MG/1
10 TABLET ORAL DAILY
Qty: 30 TABLET | Refills: 5 | Status: ON HOLD | OUTPATIENT
Start: 2019-04-05 | End: 2019-05-17 | Stop reason: HOSPADM

## 2019-04-05 RX ORDER — PANTOPRAZOLE SODIUM 20 MG/1
20 TABLET, DELAYED RELEASE ORAL 2 TIMES DAILY
Qty: 60 TABLET | Refills: 5 | Status: CANCELLED | OUTPATIENT
Start: 2019-04-05

## 2019-05-11 ENCOUNTER — HOSPITAL ENCOUNTER (EMERGENCY)
Age: 29
Discharge: ANOTHER HOSPITAL | End: 2019-05-12
Attending: EMERGENCY MEDICINE
Payer: MEDICARE

## 2019-05-11 DIAGNOSIS — R45.851 SUICIDAL IDEATION: Primary | ICD-10-CM

## 2019-05-11 DIAGNOSIS — F41.1 ANXIETY STATE: ICD-10-CM

## 2019-05-11 LAB
ABSOLUTE EOS #: 0 K/UL (ref 0–0.4)
ABSOLUTE IMMATURE GRANULOCYTE: ABNORMAL K/UL (ref 0–0.3)
ABSOLUTE LYMPH #: 2.7 K/UL (ref 1–4.8)
ABSOLUTE MONO #: 0.9 K/UL (ref 0.1–1.2)
ACETAMINOPHEN LEVEL: <5 UG/ML (ref 10–30)
ALBUMIN SERPL-MCNC: 4.8 G/DL (ref 3.5–5.2)
ALBUMIN/GLOBULIN RATIO: 1.4 (ref 1–2.5)
ALP BLD-CCNC: 104 U/L (ref 40–129)
ALT SERPL-CCNC: 25 U/L (ref 5–41)
ANION GAP SERPL CALCULATED.3IONS-SCNC: 23 MMOL/L (ref 9–17)
AST SERPL-CCNC: 14 U/L
BASOPHILS # BLD: 0 % (ref 0–2)
BASOPHILS ABSOLUTE: 0.1 K/UL (ref 0–0.2)
BILIRUB SERPL-MCNC: 0.43 MG/DL (ref 0.3–1.2)
BUN BLDV-MCNC: 15 MG/DL (ref 6–20)
BUN/CREAT BLD: 17 (ref 9–20)
CALCIUM SERPL-MCNC: 10 MG/DL (ref 8.6–10.4)
CHLORIDE BLD-SCNC: 104 MMOL/L (ref 98–107)
CO2: 20 MMOL/L (ref 20–31)
CREAT SERPL-MCNC: 0.87 MG/DL (ref 0.7–1.2)
DIFFERENTIAL TYPE: ABNORMAL
EOSINOPHILS RELATIVE PERCENT: 0 % (ref 1–8)
ETHANOL PERCENT: ABNORMAL %
ETHANOL: <10 MG/DL
GFR AFRICAN AMERICAN: >60 ML/MIN
GFR NON-AFRICAN AMERICAN: >60 ML/MIN
GFR SERPL CREATININE-BSD FRML MDRD: ABNORMAL ML/MIN/{1.73_M2}
GFR SERPL CREATININE-BSD FRML MDRD: ABNORMAL ML/MIN/{1.73_M2}
GLUCOSE BLD-MCNC: 138 MG/DL (ref 70–99)
HCT VFR BLD CALC: 46.8 % (ref 41–53)
HEMOGLOBIN: 15.5 G/DL (ref 13.5–17.5)
IMMATURE GRANULOCYTES: ABNORMAL %
LYMPHOCYTES # BLD: 14 % (ref 15–43)
MCH RBC QN AUTO: 29.2 PG (ref 26–34)
MCHC RBC AUTO-ENTMCNC: 33.1 G/DL (ref 31–37)
MCV RBC AUTO: 88.3 FL (ref 80–100)
MONOCYTES # BLD: 4 % (ref 6–14)
NRBC AUTOMATED: ABNORMAL PER 100 WBC
PDW BLD-RTO: 13.7 % (ref 11–14.5)
PLATELET # BLD: 463 K/UL (ref 140–450)
PLATELET ESTIMATE: ABNORMAL
PMV BLD AUTO: 7.3 FL (ref 6–12)
POTASSIUM SERPL-SCNC: 3.7 MMOL/L (ref 3.7–5.3)
RBC # BLD: 5.3 M/UL (ref 4.5–5.9)
RBC # BLD: ABNORMAL 10*6/UL
SALICYLATE LEVEL: <1 MG/DL (ref 3–10)
SEG NEUTROPHILS: 82 % (ref 44–74)
SEGMENTED NEUTROPHILS ABSOLUTE COUNT: 16.2 K/UL (ref 1.8–7.7)
SODIUM BLD-SCNC: 147 MMOL/L (ref 135–144)
TOTAL PROTEIN: 8.2 G/DL (ref 6.4–8.3)
TOXIC TRICYCLIC SC,BLOOD: ABNORMAL
WBC # BLD: 19.8 K/UL (ref 3.5–11)
WBC # BLD: ABNORMAL 10*3/UL

## 2019-05-11 PROCEDURE — 6360000002 HC RX W HCPCS: Performed by: EMERGENCY MEDICINE

## 2019-05-11 PROCEDURE — 36415 COLL VENOUS BLD VENIPUNCTURE: CPT

## 2019-05-11 PROCEDURE — 93005 ELECTROCARDIOGRAM TRACING: CPT

## 2019-05-11 PROCEDURE — 6360000002 HC RX W HCPCS

## 2019-05-11 PROCEDURE — 80307 DRUG TEST PRSMV CHEM ANLYZR: CPT

## 2019-05-11 PROCEDURE — 80306 DRUG TEST PRSMV INSTRMNT: CPT

## 2019-05-11 PROCEDURE — 85025 COMPLETE CBC W/AUTO DIFF WBC: CPT

## 2019-05-11 PROCEDURE — G0480 DRUG TEST DEF 1-7 CLASSES: HCPCS

## 2019-05-11 PROCEDURE — 96372 THER/PROPH/DIAG INJ SC/IM: CPT

## 2019-05-11 PROCEDURE — 6370000000 HC RX 637 (ALT 250 FOR IP): Performed by: EMERGENCY MEDICINE

## 2019-05-11 PROCEDURE — 99285 EMERGENCY DEPT VISIT HI MDM: CPT

## 2019-05-11 PROCEDURE — 80053 COMPREHEN METABOLIC PANEL: CPT

## 2019-05-11 RX ORDER — LORAZEPAM 0.5 MG/1
1 TABLET ORAL ONCE
Status: DISCONTINUED | OUTPATIENT
Start: 2019-05-11 | End: 2019-05-12 | Stop reason: HOSPADM

## 2019-05-11 RX ORDER — MAGNESIUM HYDROXIDE/ALUMINUM HYDROXICE/SIMETHICONE 120; 1200; 1200 MG/30ML; MG/30ML; MG/30ML
15 SUSPENSION ORAL ONCE
Status: COMPLETED | OUTPATIENT
Start: 2019-05-11 | End: 2019-05-11

## 2019-05-11 RX ORDER — LORAZEPAM 2 MG/ML
1 INJECTION INTRAMUSCULAR ONCE
Status: COMPLETED | OUTPATIENT
Start: 2019-05-11 | End: 2019-05-11

## 2019-05-11 RX ORDER — LORAZEPAM 2 MG/ML
INJECTION INTRAMUSCULAR
Status: DISCONTINUED
Start: 2019-05-11 | End: 2019-05-12 | Stop reason: HOSPADM

## 2019-05-11 RX ORDER — ACETAMINOPHEN 160 MG
4000 TABLET,DISINTEGRATING ORAL DAILY
COMMUNITY
End: 2019-07-02 | Stop reason: SDUPTHER

## 2019-05-11 RX ORDER — LORAZEPAM 2 MG/ML
INJECTION INTRAMUSCULAR
Status: COMPLETED
Start: 2019-05-11 | End: 2019-05-11

## 2019-05-11 RX ORDER — LORAZEPAM 2 MG/ML
1 INJECTION INTRAMUSCULAR ONCE
Status: DISCONTINUED | OUTPATIENT
Start: 2019-05-11 | End: 2019-05-11

## 2019-05-11 RX ORDER — ONDANSETRON 4 MG/1
4 TABLET, ORALLY DISINTEGRATING ORAL ONCE
Status: COMPLETED | OUTPATIENT
Start: 2019-05-11 | End: 2019-05-11

## 2019-05-11 RX ADMIN — LORAZEPAM 1 MG: 2 INJECTION INTRAMUSCULAR at 20:04

## 2019-05-11 RX ADMIN — ONDANSETRON 4 MG: 4 TABLET, ORALLY DISINTEGRATING ORAL at 19:54

## 2019-05-11 RX ADMIN — ALUMINUM HYDROXIDE, MAGNESIUM HYDROXIDE, AND SIMETHICONE 15 ML: 200; 200; 20 SUSPENSION ORAL at 19:52

## 2019-05-11 RX ADMIN — LORAZEPAM 1 MG: 2 INJECTION INTRAMUSCULAR; INTRAVENOUS at 23:23

## 2019-05-11 RX ADMIN — LIDOCAINE HYDROCHLORIDE 5 ML: 20 SOLUTION ORAL; TOPICAL at 19:52

## 2019-05-11 RX ADMIN — LORAZEPAM 1 MG: 2 INJECTION INTRAMUSCULAR; INTRAVENOUS at 20:04

## 2019-05-11 ASSESSMENT — PATIENT HEALTH QUESTIONNAIRE - PHQ9: SUM OF ALL RESPONSES TO PHQ QUESTIONS 1-9: 24

## 2019-05-11 NOTE — ED PROVIDER NOTES
(INDERAL) 10 MG TABLET    Take 1 tablet by mouth daily    SUCRALFATE (CARAFATE) 1 GM TABLET    Take 1 tablet by mouth 4 times daily    TRAZODONE (DESYREL) 50 MG TABLET    Take 1 tablet by mouth nightly as needed for Sleep       ALLERGIES     is allergic to zoloft [sertraline hcl].       Diagnostic Results     LABS:   Results for orders placed or performed during the hospital encounter of 05/11/19   Urine Drug Screen   Result Value Ref Range    Amphetamine Screen, Ur NEGATIVE NEGATIVE    Barbiturate Screen, Ur NEGATIVE NEGATIVE    Benzodiazepine Screen, Urine POSITIVE (A) NEGATIVE    Cocaine Metabolite, Urine NEGATIVE NEGATIVE    Methadone Screen, Urine NEGATIVE NEGATIVE    Opiates, Urine NEGATIVE NEGATIVE    Phencyclidine, Urine NEGATIVE NEGATIVE    Propoxyphene, Urine NEGATIVE NEGATIVE    Cannabinoid Scrn, Ur POSITIVE (A) NEGATIVE    Oxycodone Screen, Ur NEGATIVE NEGATIVE    Methamphetamine, Urine NEGATIVE NEGATIVE    Tricyclic Antidepressants, Urine NEGATIVE NEGATIVE    MDMA, Urine NOT REPORTED NEGATIVE    Buprenorphine Urine NEGATIVE NEGATIVE    Test Information       The following threshold concentrations are established for the drug assays:   TOX SCR, BLD, ED   Result Value Ref Range    Ethanol <10 <10 mg/dL    Ethanol percent NOT REPORTED %    Salicylate Lvl <1 (L) 3 - 10 mg/dL    Acetaminophen Level <5 (L) 10 - 30 ug/mL    Toxic Tricyclic Sc,Blood PENDING NEGATIVE   CBC Auto Differential   Result Value Ref Range    WBC 19.8 (H) 3.5 - 11.0 k/uL    RBC 5.30 4.5 - 5.9 m/uL    Hemoglobin 15.5 13.5 - 17.5 g/dL    Hematocrit 46.8 41 - 53 %    MCV 88.3 80 - 100 fL    MCH 29.2 26 - 34 pg    MCHC 33.1 31 - 37 g/dL    RDW 13.7 11.0 - 14.5 %    Platelets 601 (H) 826 - 450 k/uL    MPV 7.3 6.0 - 12.0 fL    NRBC Automated NOT REPORTED per 100 WBC    Differential Type NOT REPORTED     Immature Granulocytes NOT REPORTED 0 %    Absolute Immature Granulocyte NOT REPORTED 0.00 - 0.30 k/uL    WBC Morphology NOT REPORTED     RBC Morphology NOT REPORTED     Platelet Estimate NOT REPORTED     Seg Neutrophils 82 (H) 44 - 74 %    Lymphocytes 14 (L) 15 - 43 %    Monocytes 4 (L) 6 - 14 %    Eosinophils % 0 (L) 1 - 8 %    Basophils 0 0 - 2 %    Segs Absolute 16.20 (H) 1.8 - 7.7 k/uL    Absolute Lymph # 2.70 1.0 - 4.8 k/uL    Absolute Mono # 0.90 0.1 - 1.2 k/uL    Absolute Eos # 0.00 0.0 - 0.4 k/uL    Basophils # 0.10 0.0 - 0.2 k/uL   Comprehensive Metabolic Panel   Result Value Ref Range    Glucose 138 (H) 70 - 99 mg/dL    BUN 15 6 - 20 mg/dL    CREATININE 0.87 0.70 - 1.20 mg/dL    Bun/Cre Ratio 17 9 - 20    Calcium 10.0 8.6 - 10.4 mg/dL    Sodium 147 (H) 135 - 144 mmol/L    Potassium 3.7 3.7 - 5.3 mmol/L    Chloride 104 98 - 107 mmol/L    CO2 20 20 - 31 mmol/L    Anion Gap 23 (H) 9 - 17 mmol/L    Alkaline Phosphatase 104 40 - 129 U/L    ALT 25 5 - 41 U/L    AST 14 <40 U/L    Total Bilirubin 0.43 0.3 - 1.2 mg/dL    Total Protein 8.2 6.4 - 8.3 g/dL    Alb 4.8 3.5 - 5.2 g/dL    Albumin/Globulin Ratio 1.4 1.0 - 2.5    GFR Non-African American >60 >60 mL/min    GFR African American >60 >60 mL/min    GFR Comment          GFR Staging NOT REPORTED    Urinalysis, Routine   Result Value Ref Range    Color, UA NOT REPORTED YELLOW    Turbidity UA NOT REPORTED CLEAR    Glucose, Ur NEGATIVE NEGATIVE    Bilirubin Urine NEGATIVE NEGATIVE    Ketones, Urine 1+ (A) NEGATIVE    Specific Gravity, UA 1.030 (H) 1.010 - 1.025    Urine Hgb NEGATIVE NEGATIVE    pH, UA 6.0 5.0 - 6.0    Protein, UA NEGATIVE NEGATIVE    Urobilinogen, Urine Normal Normal    Nitrite, Urine NEGATIVE NEGATIVE    Leukocyte Esterase, Urine NEGATIVE NEGATIVE    Urinalysis Comments NOT REPORTED    Microscopic Urinalysis   Result Value Ref Range    -          WBC, UA None 0 - 4 /HPF    RBC, UA None 0 - 4 /HPF    Casts UA NOT REPORTED 0 - 2 /LPF    Crystals UA NOT REPORTED None /HPF    Epithelial Cells UA None 0 - 5 /HPF    Renal Epithelial, Urine NOT REPORTED 0 /HPF    Bacteria, UA TRACE (A) None Mucus, UA NOT REPORTED None    Trichomonas, UA NOT REPORTED None    Amorphous, UA 4+ (A) None    Other Observations UA NOT REPORTED NOT REQ. Yeast, UA NOT REPORTED None   EKG 12 Lead   Result Value Ref Range    Ventricular Rate 69 BPM    Atrial Rate 69 BPM    P-R Interval 140 ms    QRS Duration 96 ms    Q-T Interval 410 ms    QTc Calculation (Bazett) 439 ms    P Axis 29 degrees    R Axis 26 degrees    T Axis 25 degrees       RADIOLOGY:  No orders to display       RECENT VITALS:  BP: (!) 150/89, Temp: 98.6 °F (37 °C), Pulse: 69, Resp: 18     ED Course     The patient was given the following medications:  Orders Placed This Encounter   Medications    aluminum & magnesium hydroxide-simethicone (MAALOX) 200-200-20 MG/5ML suspension 15 mL    lidocaine viscous (XYLOCAINE) 2 % solution 5 mL    LORazepam (ATIVAN) tablet 1 mg    ondansetron (ZOFRAN-ODT) disintegrating tablet 4 mg    LORazepam (ATIVAN) 2 MG/ML injection     APOLINAR HAN: cabinet override    LORazepam (ATIVAN) injection 1 mg    LORazepam (ATIVAN) 2 MG/ML injection     MITZY LANDAVERDE: cabinet override    LORazepam (ATIVAN) injection 1 mg    DISCONTD: LORazepam (ATIVAN) injection 1 mg    haloperidol lactate (HALDOL) injection 5 mg    haloperidol lactate (HALDOL) 5 MG/ML injection     APOLINAR HAN: cabinet override       Medical Decision Making       patient signed out to me pending medical clearance and screening by the psychiatric team.    Twelve lead EKG interpreted by myself:  A 12 lead EKG done at 1920, interpreted by myself, showed a regular rhythm at a rate of 69 bpm.  The IN interval was normal.  The QRS complex was normal.  There was no ST segment elevation or depression, T wave inversion not present. QRS progression through precordial leads was grossly normal.  Interpretation: Normal sinus rhythm, no ST segment changes and no pattern consistent with acute ischemia or infarct    Leukocytosis is chronic.     Patient received Ativan 2 different times in the emergency department. He also received Zofran. He was still complaining of anxiety and nausea, I tried 5 mg of Haldol intramuscularly which helped quite well with his symptoms. Pt medically clear for psychiatric eval and admission. Patient was evaluated and deemed necessary for admission. He will be sent to [facility]. I spoke with the nurse practitioner on call at approximately 1:15 AM.    Disposition     FINAL IMPRESSION      1. Suicidal ideation    2. Anxiety state          DISPOSITION/PLAN   DISPOSITION        CONDITION ON DISPOSITION:   stable    PATIENT REFERRED TO:  No follow-up provider specified.     DISCHARGE MEDICATIONS:  New Prescriptions    No medications on file             (Please note that portions of this note were completed with a voice recognition program.  Efforts were made to edit the dictations but occasionally words are mis-transcribed.)    Denisse Higgins DO  Board Certified Emergency Medicine Physician                  Denisse Higgins DO  05/12/19 [id number]

## 2019-05-11 NOTE — ED PROVIDER NOTES
[address] [facility]  eMERGENCY dEPARTMENT eNCOUnter      Pt Name: Vicki Jamison  MRN: [id number]  [place] 1990  Date of evaluation: 5/11/2019      CHIEF COMPLAINT       Chief Complaint   Patient presents with    Suicidal     wanted to cut his wrist    Anxiety         HISTORY OF PRESENT ILLNESS      The patient presents to the emergency department with suicidal ideation. The patient states he is thinking about slitting his wrists. He says his mother stopped him, tonight. The patient was at a party with his family and became anxious and overwhelmed while cooking. He has a history of depression and anxiety. [facility] He would like to go to the [address] [place] [address] [address]. The patient reports using medical marijuana, but no other drugs. He has been taking his medications as directed. His family accompanies him. He denies taking drugs in overdose and has not injured himself. REVIEW OF SYSTEMS       All systems reviewed and negative unless noted in HPI. The patient denies fever or constitutional symptoms. Denies vision change. Denies any sore throat or rhinorrhea. Denies any neck pain or stiffness. Denies chest pain or shortness of breath. No nausea,  vomiting or diarrhea. Denies any dysuria. Denies urinary frequency or hematuria. Denies musculoskeletal injury or pain. Denies any weakness, numbness or focal neurologic deficit. Denies any skin rash or edema. History of PTST and depression with suicidal ideation. No easy bruising or bleeding. Denies any polyuria, polydypsia or history of immunocompromise. PAST MEDICAL HISTORY    has a past medical history of Acid reflux, Anxiety, Appendicitis, Diverticulitis, History of acute renal failure, History of stomach ulcers, Irritable bowel syndrome, and Ulcer. SURGICAL HISTORY      has a past surgical history that includes Appendectomy; Upper gastrointestinal endoscopy;  Colonoscopy; and Snyder tooth extraction. CURRENT MEDICATIONS       Previous Medications    CITALOPRAM (CELEXA) 40 MG TABLET    Take 40 mg by mouth daily    CLONAZEPAM (KLONOPIN) 0.5 MG TABLET    Take 1 mg by mouth nightly. DESVENLAFAXINE (KHEDEZLA) 50 MG TB24 EXTENDED RELEASE TABLET    Take 50 mg by mouth daily    DESVENLAFAXINE SUCCINATE (PRISTIQ) 50 MG TB24 EXTENDED RELEASE TABLET    Take 50 mg by mouth    DICYCLOMINE (BENTYL) 10 MG CAPSULE    Take 1 capsule by mouth 4 times daily    FLUTICASONE (FLONASE) 50 MCG/ACT NASAL SPRAY    1 spray by Each Nare route daily    HYDROXYZINE (ATARAX) 50 MG TABLET    Take 50 mg by mouth 3 times daily as needed for Itching    MIRTAZAPINE (REMERON) 30 MG TABLET    Take 1 tablet by mouth nightly    OLANZAPINE ZYDIS (ZYPREXA) 5 MG DISINTEGRATING TABLET    Take 1 tablet by mouth nightly    PANTOPRAZOLE (PROTONIX) 20 MG TABLET    Take 1 tablet by mouth 2 times daily    PRAZOSIN (MINIPRESS) 1 MG CAPSULE    Take 1 capsule by mouth 2 times daily    PROMETHAZINE (PHENERGAN) 25 MG TABLET    Take 1 tablet by mouth every 6 hours as needed for Nausea    PROPRANOLOL (INDERAL) 10 MG TABLET    Take 1 tablet by mouth daily    SUCRALFATE (CARAFATE) 1 GM TABLET    Take 1 tablet by mouth 4 times daily    TRAZODONE (DESYREL) 50 MG TABLET    Take 1 tablet by mouth nightly as needed for Sleep       ALLERGIES     is allergic to zoloft [sertraline hcl]. FAMILY HISTORY     indicated that his mother is alive. He indicated that his father is . He indicated that his sister is alive. He indicated that both of his brothers are alive. family history includes Allergies in his brother; Cancer in his maternal uncle; Crohn's Disease in his maternal uncle; Kidney Disease in his mother; Other in his father and mother. SOCIAL HISTORY      reports that he has quit smoking. His smoking use included cigarettes. He smoked 0.50 packs per day.  He has never used smokeless tobacco. He reports that he drinks about 1.2 oz of alcohol per week. He reports that he has current or past drug history. Drug: Marijuana. Frequency: 6.00 times per week. PHYSICAL EXAM     INITIAL VITALS:  tympanic temperature is 98.6 °F (37 °C). His pulse is 71. His respiration is 28 and oxygen saturation is 98%. Patient is alert and oriented, appears anxious. Pressured speech noted. HEENT is atraumatic. Pupils are PERRL at 4 mm with normal extraocular motion. No nystagmus. Mucous membranes moist.    Neck is supple with no lymphadenopathy. No JVD. No meningismus. Heart sounds regular rate and rhythm with no gallops, murmurs, or rubs. Lungs clear, no wheezes, rales or rhonchi. Abdomen: soft, nontender with no pain to palpation. Musculoskeletal exam shows no evidence of trauma. Skin: no rash or edema. Neurological exam reveals cranial nerves 2 through 12 grossly intact. Patient has equal  and normal deep tendon reflexes. Psychiatric: reports suicidal thoughts. No evidence of hallucinations or delusions. Poor eye contact and appears anxious. Increased motor movement of hands and extremities. Lymphatics.:  No lymphadenopathy. DIFFERENTIAL DIAGNOSIS/ MDM:     Suicidal ideation, anxiety    DIAGNOSTIC RESULTS     EKG: All EKG's are interpreted by the Emergency Department Physician who either signs or Co-signs this chart in the absence of a cardiologist.        RADIOLOGY:   I directly visualized the following  images and reviewed the radiologist interpretations:  No orders to display       LABS:  No results found for this visit on 05/11/19. EMERGENCY DEPARTMENT COURSE:   Vitals:    Vitals:    05/11/19 1912   Pulse: 71   Resp: 28   Temp: 98.6 °F (37 °C)   TempSrc: Tympanic   SpO2: 98%     -------------------------   , Temp: 98.6 °F (37 °C), Pulse: 71, Resp: 28      Re-evaluation Notes    The patient will be endorsed to Dr. Saumya Strong secondary to end of shift. Please refer to his documentation.         FINAL IMPRESSION    No diagnosis found. DISPOSITION/PLAN   DISPOSITION        Condition on Disposition    stable    PATIENT REFERRED TO:  No follow-up provider specified.     DISCHARGE MEDICATIONS:  New Prescriptions    No medications on file       (Please note that portions of this note were completed with a voice recognition program.  Efforts were made to edit the dictations but occasionally words are mis-transcribed.)    [place] MD   Attending Emergency Physician         Mansoor Gonzalez MD  05/11/19 [id number]

## 2019-05-12 ENCOUNTER — HOSPITAL ENCOUNTER (INPATIENT)
Age: 29
LOS: 5 days | Discharge: HOME | DRG: 751 | End: 2019-05-17
Attending: PSYCHIATRY & NEUROLOGY | Admitting: PSYCHIATRY & NEUROLOGY
Payer: MEDICARE

## 2019-05-12 VITALS
DIASTOLIC BLOOD PRESSURE: 89 MMHG | RESPIRATION RATE: 18 BRPM | OXYGEN SATURATION: 98 % | TEMPERATURE: 98.6 F | SYSTOLIC BLOOD PRESSURE: 150 MMHG | HEART RATE: 69 BPM

## 2019-05-12 PROBLEM — F32.2 SEVERE MAJOR DEPRESSION (HCC): Status: ACTIVE | Noted: 2019-05-12

## 2019-05-12 LAB
-: ABNORMAL
ABSOLUTE BANDS #: 0.79 K/UL (ref 0–1)
ABSOLUTE EOS #: 0 K/UL (ref 0–0.4)
ABSOLUTE IMMATURE GRANULOCYTE: ABNORMAL K/UL (ref 0–0.3)
ABSOLUTE LYMPH #: 1.77 K/UL (ref 1–4.8)
ABSOLUTE MONO #: 0.99 K/UL (ref 0.1–1.3)
ALBUMIN SERPL-MCNC: 4.8 G/DL (ref 3.5–5.2)
ALBUMIN/GLOBULIN RATIO: NORMAL (ref 1–2.5)
ALP BLD-CCNC: 99 U/L (ref 40–129)
ALT SERPL-CCNC: 24 U/L (ref 5–41)
AMORPHOUS: ABNORMAL
AMPHETAMINE SCREEN URINE: NEGATIVE
ANION GAP SERPL CALCULATED.3IONS-SCNC: 15 MMOL/L (ref 9–17)
AST SERPL-CCNC: 18 U/L
BACTERIA: ABNORMAL
BANDS: 4 % (ref 0–10)
BARBITURATE SCREEN URINE: NEGATIVE
BASOPHILS # BLD: 0 % (ref 0–2)
BASOPHILS ABSOLUTE: 0 K/UL (ref 0–0.2)
BENZODIAZEPINE SCREEN, URINE: POSITIVE
BILIRUB SERPL-MCNC: 0.43 MG/DL (ref 0.3–1.2)
BILIRUBIN DIRECT: 0.12 MG/DL
BILIRUBIN URINE: NEGATIVE
BILIRUBIN, INDIRECT: 0.31 MG/DL (ref 0–1)
BUN BLDV-MCNC: 16 MG/DL (ref 6–20)
BUN/CREAT BLD: ABNORMAL (ref 9–20)
BUPRENORPHINE URINE: NEGATIVE
CALCIUM SERPL-MCNC: 9.8 MG/DL (ref 8.6–10.4)
CANNABINOID SCREEN URINE: POSITIVE
CASTS UA: ABNORMAL /LPF (ref 0–2)
CHLORIDE BLD-SCNC: 101 MMOL/L (ref 98–107)
CO2: 24 MMOL/L (ref 20–31)
COCAINE METABOLITE, URINE: NEGATIVE
COLOR: ABNORMAL
COMMENT UA: ABNORMAL
CREAT SERPL-MCNC: 0.99 MG/DL (ref 0.7–1.2)
CRYSTALS, UA: ABNORMAL /HPF
DIFFERENTIAL TYPE: ABNORMAL
EKG ATRIAL RATE: 69 BPM
EKG P AXIS: 29 DEGREES
EKG P-R INTERVAL: 140 MS
EKG Q-T INTERVAL: 410 MS
EKG QRS DURATION: 96 MS
EKG QTC CALCULATION (BAZETT): 439 MS
EKG R AXIS: 26 DEGREES
EKG T AXIS: 25 DEGREES
EKG VENTRICULAR RATE: 69 BPM
EOSINOPHILS RELATIVE PERCENT: 0 % (ref 0–4)
EPITHELIAL CELLS UA: ABNORMAL /HPF (ref 0–5)
ESTIMATED AVERAGE GLUCOSE: 111 MG/DL
GFR AFRICAN AMERICAN: >60 ML/MIN
GFR NON-AFRICAN AMERICAN: >60 ML/MIN
GFR SERPL CREATININE-BSD FRML MDRD: ABNORMAL ML/MIN/{1.73_M2}
GFR SERPL CREATININE-BSD FRML MDRD: ABNORMAL ML/MIN/{1.73_M2}
GLOBULIN: NORMAL G/DL (ref 1.5–3.8)
GLUCOSE BLD-MCNC: 125 MG/DL (ref 70–99)
GLUCOSE URINE: NEGATIVE
HBA1C MFR BLD: 5.5 % (ref 4–6)
HCT VFR BLD CALC: 44.9 % (ref 41–53)
HEMOGLOBIN: 15.3 G/DL (ref 13.5–17.5)
IMMATURE GRANULOCYTES: ABNORMAL %
KETONES, URINE: ABNORMAL
LEUKOCYTE ESTERASE, URINE: NEGATIVE
LYMPHOCYTES # BLD: 9 % (ref 24–44)
MCH RBC QN AUTO: 30.1 PG (ref 26–34)
MCHC RBC AUTO-ENTMCNC: 34.1 G/DL (ref 31–37)
MCV RBC AUTO: 88.3 FL (ref 80–100)
MDMA URINE: ABNORMAL
METHADONE SCREEN, URINE: NEGATIVE
METHAMPHETAMINE, URINE: NEGATIVE
MONOCYTES # BLD: 5 % (ref 1–7)
MORPHOLOGY: NORMAL
MUCUS: ABNORMAL
NITRITE, URINE: NEGATIVE
NRBC AUTOMATED: ABNORMAL PER 100 WBC
OPIATES, URINE: NEGATIVE
OTHER OBSERVATIONS UA: ABNORMAL
OXYCODONE SCREEN URINE: NEGATIVE
PDW BLD-RTO: 13.7 % (ref 11.5–14.9)
PH UA: 6 (ref 5–6)
PHENCYCLIDINE, URINE: NEGATIVE
PLATELET # BLD: 444 K/UL (ref 150–450)
PLATELET ESTIMATE: ABNORMAL
PMV BLD AUTO: 7.4 FL (ref 6–12)
POTASSIUM SERPL-SCNC: 4.4 MMOL/L (ref 3.7–5.3)
PROPOXYPHENE, URINE: NEGATIVE
PROTEIN UA: NEGATIVE
RBC # BLD: 5.08 M/UL (ref 4.5–5.9)
RBC # BLD: ABNORMAL 10*6/UL
RBC UA: ABNORMAL /HPF (ref 0–4)
RENAL EPITHELIAL, UA: ABNORMAL /HPF
SEG NEUTROPHILS: 82 % (ref 36–66)
SEGMENTED NEUTROPHILS ABSOLUTE COUNT: 16.15 K/UL (ref 1.3–9.1)
SODIUM BLD-SCNC: 140 MMOL/L (ref 135–144)
SPECIFIC GRAVITY UA: 1.03 (ref 1.01–1.02)
TEST INFORMATION: ABNORMAL
THYROXINE, FREE: 1.27 NG/DL (ref 0.93–1.7)
TOTAL PROTEIN: 8.2 G/DL (ref 6.4–8.3)
TRICHOMONAS: ABNORMAL
TRICYCLIC ANTIDEPRESSANTS, UR: NEGATIVE
TSH SERPL DL<=0.05 MIU/L-ACNC: 0.23 MIU/L (ref 0.3–5)
TURBIDITY: ABNORMAL
URINE HGB: NEGATIVE
UROBILINOGEN, URINE: NORMAL
WBC # BLD: 19.7 K/UL (ref 3.5–11)
WBC # BLD: ABNORMAL 10*3/UL
WBC UA: ABNORMAL /HPF (ref 0–4)
YEAST: ABNORMAL

## 2019-05-12 PROCEDURE — 84443 ASSAY THYROID STIM HORMONE: CPT

## 2019-05-12 PROCEDURE — 83036 HEMOGLOBIN GLYCOSYLATED A1C: CPT

## 2019-05-12 PROCEDURE — 80076 HEPATIC FUNCTION PANEL: CPT

## 2019-05-12 PROCEDURE — 6360000002 HC RX W HCPCS: Performed by: EMERGENCY MEDICINE

## 2019-05-12 PROCEDURE — 85025 COMPLETE CBC W/AUTO DIFF WBC: CPT

## 2019-05-12 PROCEDURE — 84439 ASSAY OF FREE THYROXINE: CPT

## 2019-05-12 PROCEDURE — 1240000000 HC EMOTIONAL WELLNESS R&B

## 2019-05-12 PROCEDURE — 81001 URINALYSIS AUTO W/SCOPE: CPT

## 2019-05-12 PROCEDURE — 90792 PSYCH DIAG EVAL W/MED SRVCS: CPT | Performed by: NURSE PRACTITIONER

## 2019-05-12 PROCEDURE — 80048 BASIC METABOLIC PNL TOTAL CA: CPT

## 2019-05-12 PROCEDURE — 36415 COLL VENOUS BLD VENIPUNCTURE: CPT

## 2019-05-12 PROCEDURE — 96372 THER/PROPH/DIAG INJ SC/IM: CPT

## 2019-05-12 PROCEDURE — 6370000000 HC RX 637 (ALT 250 FOR IP): Performed by: NURSE PRACTITIONER

## 2019-05-12 PROCEDURE — 6370000000 HC RX 637 (ALT 250 FOR IP): Performed by: PSYCHIATRY & NEUROLOGY

## 2019-05-12 RX ORDER — LORAZEPAM 2 MG/ML
1 INJECTION INTRAMUSCULAR ONCE
Status: COMPLETED | OUTPATIENT
Start: 2019-05-12 | End: 2019-05-12

## 2019-05-12 RX ORDER — HALOPERIDOL 5 MG/ML
5 INJECTION INTRAMUSCULAR ONCE
Status: COMPLETED | OUTPATIENT
Start: 2019-05-12 | End: 2019-05-12

## 2019-05-12 RX ORDER — BENZTROPINE MESYLATE 1 MG/ML
2 INJECTION INTRAMUSCULAR; INTRAVENOUS 2 TIMES DAILY PRN
Status: DISCONTINUED | OUTPATIENT
Start: 2019-05-12 | End: 2019-05-17 | Stop reason: HOSPADM

## 2019-05-12 RX ORDER — DESVENLAFAXINE 50 MG/1
50 TABLET, EXTENDED RELEASE ORAL DAILY
Status: DISCONTINUED | OUTPATIENT
Start: 2019-05-12 | End: 2019-05-13

## 2019-05-12 RX ORDER — HALOPERIDOL 5 MG/ML
5 INJECTION INTRAMUSCULAR EVERY 4 HOURS PRN
Status: DISCONTINUED | OUTPATIENT
Start: 2019-05-12 | End: 2019-05-14

## 2019-05-12 RX ORDER — CLONAZEPAM 1 MG/1
1 TABLET ORAL NIGHTLY
Status: DISCONTINUED | OUTPATIENT
Start: 2019-05-12 | End: 2019-05-13

## 2019-05-12 RX ORDER — PRAZOSIN HYDROCHLORIDE 1 MG/1
2 CAPSULE ORAL NIGHTLY
Status: DISCONTINUED | OUTPATIENT
Start: 2019-05-12 | End: 2019-05-17 | Stop reason: HOSPADM

## 2019-05-12 RX ORDER — PROMETHAZINE HYDROCHLORIDE 25 MG/1
25 TABLET ORAL EVERY 6 HOURS PRN
Status: DISCONTINUED | OUTPATIENT
Start: 2019-05-12 | End: 2019-05-17 | Stop reason: HOSPADM

## 2019-05-12 RX ORDER — OLANZAPINE 5 MG/1
5 TABLET ORAL
Status: DISCONTINUED | OUTPATIENT
Start: 2019-05-12 | End: 2019-05-12

## 2019-05-12 RX ORDER — TRAZODONE HYDROCHLORIDE 50 MG/1
50 TABLET ORAL NIGHTLY PRN
Status: DISCONTINUED | OUTPATIENT
Start: 2019-05-12 | End: 2019-05-12

## 2019-05-12 RX ORDER — HYDROXYZINE 50 MG/1
50 TABLET, FILM COATED ORAL 3 TIMES DAILY PRN
Status: DISCONTINUED | OUTPATIENT
Start: 2019-05-12 | End: 2019-05-17 | Stop reason: HOSPADM

## 2019-05-12 RX ORDER — DICYCLOMINE HYDROCHLORIDE 10 MG/1
10 CAPSULE ORAL 4 TIMES DAILY
Status: DISCONTINUED | OUTPATIENT
Start: 2019-05-12 | End: 2019-05-17 | Stop reason: HOSPADM

## 2019-05-12 RX ORDER — ACETAMINOPHEN 325 MG/1
650 TABLET ORAL EVERY 4 HOURS PRN
Status: DISCONTINUED | OUTPATIENT
Start: 2019-05-12 | End: 2019-05-17 | Stop reason: HOSPADM

## 2019-05-12 RX ORDER — OLANZAPINE 5 MG/1
5 TABLET ORAL 2 TIMES DAILY PRN
Status: DISCONTINUED | OUTPATIENT
Start: 2019-05-12 | End: 2019-05-17 | Stop reason: HOSPADM

## 2019-05-12 RX ORDER — HYDROXYZINE 50 MG/1
50 TABLET, FILM COATED ORAL 3 TIMES DAILY PRN
Status: DISCONTINUED | OUTPATIENT
Start: 2019-05-12 | End: 2019-05-12

## 2019-05-12 RX ORDER — SUCRALFATE 1 G/1
1 TABLET ORAL 4 TIMES DAILY
Status: DISCONTINUED | OUTPATIENT
Start: 2019-05-12 | End: 2019-05-17 | Stop reason: HOSPADM

## 2019-05-12 RX ORDER — FLUTICASONE PROPIONATE 50 MCG
1 SPRAY, SUSPENSION (ML) NASAL DAILY
Status: DISCONTINUED | OUTPATIENT
Start: 2019-05-12 | End: 2019-05-17 | Stop reason: HOSPADM

## 2019-05-12 RX ORDER — PANTOPRAZOLE SODIUM 20 MG/1
20 TABLET, DELAYED RELEASE ORAL
Status: DISCONTINUED | OUTPATIENT
Start: 2019-05-12 | End: 2019-05-17 | Stop reason: HOSPADM

## 2019-05-12 RX ORDER — PRAZOSIN HYDROCHLORIDE 1 MG/1
1 CAPSULE ORAL 2 TIMES DAILY
Status: DISCONTINUED | OUTPATIENT
Start: 2019-05-12 | End: 2019-05-12

## 2019-05-12 RX ORDER — HALOPERIDOL 5 MG/ML
INJECTION INTRAMUSCULAR
Status: DISCONTINUED
Start: 2019-05-12 | End: 2019-05-12 | Stop reason: HOSPADM

## 2019-05-12 RX ORDER — TRAZODONE HYDROCHLORIDE 50 MG/1
50 TABLET ORAL NIGHTLY PRN
Status: DISCONTINUED | OUTPATIENT
Start: 2019-05-12 | End: 2019-05-13

## 2019-05-12 RX ORDER — HYDROXYZINE 50 MG/1
50 TABLET, FILM COATED ORAL 3 TIMES DAILY PRN
Status: DISCONTINUED | OUTPATIENT
Start: 2019-05-12 | End: 2019-05-12 | Stop reason: SDUPTHER

## 2019-05-12 RX ADMIN — PANTOPRAZOLE SODIUM 20 MG: 20 TABLET, DELAYED RELEASE ORAL at 17:32

## 2019-05-12 RX ADMIN — CLONAZEPAM 1 MG: 1 TABLET ORAL at 21:37

## 2019-05-12 RX ADMIN — LORAZEPAM 1 MG: 2 INJECTION INTRAMUSCULAR; INTRAVENOUS at 04:48

## 2019-05-12 RX ADMIN — TRAZODONE HYDROCHLORIDE 50 MG: 50 TABLET ORAL at 21:37

## 2019-05-12 RX ADMIN — DICYCLOMINE HYDROCHLORIDE 10 MG: 10 CAPSULE ORAL at 13:45

## 2019-05-12 RX ADMIN — DESVENLAFAXINE SUCCINATE 50 MG: 50 TABLET, FILM COATED, EXTENDED RELEASE ORAL at 13:45

## 2019-05-12 RX ADMIN — SUCRALFATE 1 G: 1 TABLET ORAL at 17:32

## 2019-05-12 RX ADMIN — VITAMIN D, TAB 1000IU (100/BT) 2000 UNITS: 25 TAB at 13:45

## 2019-05-12 RX ADMIN — DICYCLOMINE HYDROCHLORIDE 10 MG: 10 CAPSULE ORAL at 21:37

## 2019-05-12 RX ADMIN — DICYCLOMINE HYDROCHLORIDE 10 MG: 10 CAPSULE ORAL at 17:32

## 2019-05-12 RX ADMIN — FLUTICASONE PROPIONATE 1 SPRAY: 50 SPRAY, METERED NASAL at 13:45

## 2019-05-12 RX ADMIN — PROMETHAZINE HYDROCHLORIDE 25 MG: 25 TABLET ORAL at 13:45

## 2019-05-12 RX ADMIN — SUCRALFATE 1 G: 1 TABLET ORAL at 21:37

## 2019-05-12 RX ADMIN — SUCRALFATE 1 G: 1 TABLET ORAL at 13:45

## 2019-05-12 RX ADMIN — HALOPERIDOL LACTATE 5 MG: 5 INJECTION, SOLUTION INTRAMUSCULAR at 00:17

## 2019-05-12 RX ADMIN — PRAZOSIN HYDROCHLORIDE 2 MG: 1 CAPSULE ORAL at 21:37

## 2019-05-12 ASSESSMENT — SLEEP AND FATIGUE QUESTIONNAIRES
DIFFICULTY STAYING ASLEEP: YES
AVERAGE NUMBER OF SLEEP HOURS: 4
SLEEP PATTERN: DIFFICULTY FALLING ASLEEP;INSOMNIA;DISTURBED/INTERRUPTED SLEEP;NIGHTMARES/TERRORS
RESTFUL SLEEP: NO
DIFFICULTY FALLING ASLEEP: YES
DIFFICULTY STAYING ASLEEP: YES
DO YOU HAVE DIFFICULTY SLEEPING: YES
DIFFICULTY ARISING: NO
AVERAGE NUMBER OF SLEEP HOURS: 4
SLEEP PATTERN: DIFFICULTY FALLING ASLEEP;NIGHTMARES/TERRORS
RESTFUL SLEEP: NO
DO YOU USE A SLEEP AID: YES
DO YOU USE A SLEEP AID: YES
DIFFICULTY ARISING: NO
DIFFICULTY FALLING ASLEEP: YES
DO YOU HAVE DIFFICULTY SLEEPING: YES

## 2019-05-12 ASSESSMENT — ENCOUNTER SYMPTOMS
ABDOMINAL DISTENTION: 0
DIARRHEA: 0
CONSTIPATION: 0
ALLERGIC/IMMUNOLOGIC NEGATIVE: 1
EYE REDNESS: 0
EYE DISCHARGE: 0
EYE PAIN: 0
BLOOD IN STOOL: 0
NAUSEA: 0

## 2019-05-12 ASSESSMENT — LIFESTYLE VARIABLES
HISTORY_ALCOHOL_USE: NO
HISTORY_ALCOHOL_USE: NO

## 2019-05-12 ASSESSMENT — PATIENT HEALTH QUESTIONNAIRE - PHQ9: SUM OF ALL RESPONSES TO PHQ QUESTIONS 1-9: 22

## 2019-05-12 ASSESSMENT — PAIN - FUNCTIONAL ASSESSMENT: PAIN_FUNCTIONAL_ASSESSMENT: 0-10

## 2019-05-12 NOTE — GROUP NOTE
Patient refused to attend Community Meeting/Goal Group at 910am after encouragement from staff. Pt also refused 1:1 talk time offered as alternative to group session.

## 2019-05-12 NOTE — GROUP NOTE
Group Therapy Note    Date: May 12    Group Start Time: 1100  Group End Time: 1140  Group Topic: Healthy Living/Wellness    [facility] Drake        Group Therapy Note    Attendees 10/21         patient refused to attend guided relaxation/meditation group at 1100 after encouragement from staff.   1:1 talk time provided as alternative to group session    Signature:  Jennifer Drake
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
## 2019-05-12 NOTE — PLAN OF CARE
[facility]  Initial Interdisciplinary Treatment Plan NO      Original treatment plan Date & Time: 5/12/19                     800AM    Admission Type:  Admission Type: Involuntary    Reason for admission:   Reason for Admission: SI no plan    Estimated Length of Stay:  5-7days  Estimated Discharge Date: to be determined by physician    PATIENT STRENGTHS:  Patient Strengths:Strengths: Positive Support, No significant Physical Illness  Patient Strengths and Limitations:Limitations: Tendency to isolate self, Lacks leisure interests, Difficulty problem solving/relies on others to help solve problems, Hopeless about future, Multiple barriers to leisure interests, Inappropriate/potentially harmful leisure interests (depression substance abuse anxiety poor coping skills)  Addictive Behavior: Addictive Behavior  In the past 3 months, have you felt or has someone told you that you have a problem with:  : None  Do you have a history of Chemical Use?: No  Do you have a history of Alcohol Use?: No  Do you have a history of Street Drug Abuse?: Yes  Histroy of Prescripton Drug Abuse?: No  Medical Problems:No past medical history on file.   Status EXAM:Status and Exam  Normal: No  Facial Expression: Elevated  Affect: Inappropriate  Level of Consciousness: Alert  Mood:Normal: No  Mood: Depressed, Anxious  Motor Activity:Normal: No  Motor Activity: Decreased  Interview Behavior: Cooperative  Preception: [place] to Person, [place] to Time, [place] to Place, [place] to Situation  Attention:Normal: Yes  Attention: Distractible  Thought Processes: Circumstantial  Thought Content:Normal: Yes  Thought Content: Preoccupations  Hallucinations: None  Delusions: No  Memory:Normal: Yes  Memory: Poor Recent, Confabulation  Insight and Judgment: No  Insight and Judgment: Unmotivated  Present Suicidal Ideation: No  Present Homicidal Ideation: No    EDUCATION:   Learner Progress Toward Treatment Goals: reviewed group plans and strategies for care    Method:group therapy, medication compliance, individualized assessments and care planning    Outcome: needs reinforcement    PATIENT GOALS: to be discussed with patient within 72 hours    PLAN/TREATMENT RECOMMENDATIONS:     continue group therapy , medications compliance, goal setting, individualized assessments and care, continue to monitor pt on unit      SHORT-TERM GOALS:   Time frame for Short-Term Goals: 5-7 days    LONG-TERM GOALS:  Time frame for Long-Term Goals: 6 months  Members Present in Team Meeting: See Signature Sheet    Rios Covington

## 2019-05-12 NOTE — BH NOTE
Patient given tobacco quitline number 49298192037 at this time, refusing to call at this time, states \" I just dont want to quit now\"- patient given information as to the dangers of long term tobacco use. Continue to reinforce the importance of tobacco cessation.

## 2019-05-12 NOTE — H&P
HISTORY and [id number]         NAME:  Barrera Kam  MRN: [id number]   YOB: 1990   Date: 5/12/2019   Age: 33 y.o. Gender: male       COMPLAINT AND PRESENT HISTORY:   Micheline Clark is a 33 yr old male who was admitted for Depression and anxiety, He lives with his Mom and [facility] to [facility]. 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 He is very sleepy today. He says he has been very agitated and wanted to cut his wrists, He thinks about suicide from time to time, He had had overdoses in the past, His last admission here was in Nov 2018. He smokes marijuana but denies other drugs. He does have + Benzodiazepine in his urine tox scree, on admission. However he had been given Ativan twice in [facility] prior to coming here, and likely got a dose prior to the urine being collected as far as I can tell from the notes in the chart review,     He had been at home helping his family by cooking for their party and he got very anxious and acted out and had to be taken to [place] [facility]. Then he was transferred here,     His WBCs are 19.8  He denies any abd pain at this time,  He has had diverticulitis in the past, Today he is very sleepy but does waken to name and denies abd pain, His last admission here they were elevated at 21.4 October 31 2019 and came down to normal with no intervention.  He recently had dental work done Left upper molar     He has diverticulitis and peptic ulcer history, but he is definite that he does not have abd pain toay, and his abd is soft and not tender to palpation,    DIAGNOSTIC RESULTS   Radiology:     EKG:    Labs:  CBC:   Recent Labs     05/11/19 1920 05/12/19  0829   WBC 19.8* 19.7*   HGB 15.5 15.3   * 444     BMP:    Recent Labs     05/11/19 1920 05/12/19  0829   * 140   K 3.7 4.4    101   CO2 20 24   BUN 15 16   CREATININE 0.87 0.99   GLUCOSE 138* 125*     Hepatic: Sexual Activity    Alcohol use: Yes     Alcohol/week: 1.2 oz     Types: 2 Cans of beer per week     Comment: stopped drinking 7 months ago - Jan 2018    Drug use: Yes     Frequency: 6.0 times per week     Types: Marijuana    Sexual activity: Not Currently     Partners: Female   Lifestyle    Physical activity:     Days per week: None     Minutes per session: None    Stress: None   Relationships    Social connections:     Talks on phone: None     Gets together: None     Attends Scientologist service: None     Active member of club or organization: None     Attends meetings of clubs or organizations: None     Relationship status: None    Intimate partner violence:     Fear of current or ex partner: None     Emotionally abused: None     Physically abused: None     Forced sexual activity: None   Other Topics Concern    None   Social History Narrative    None           REVIEW OF SYSTEMS      Allergies   Allergen Reactions    Zoloft [Sertraline Hcl] Other (See Comments)     nightmares       No current facility-administered medications on file prior to encounter.       Current Outpatient Medications on File Prior to Encounter   Medication Sig Dispense Refill    Cholecalciferol (VITAMIN D3) 2000 units CAPS Take by mouth daily      propranolol (INDERAL) 10 MG tablet Take 1 tablet by mouth daily 30 tablet 5    desvenlafaxine succinate (PRISTIQ) 50 MG TB24 extended release tablet Take 50 mg by mouth      fluticasone (FLONASE) 50 MCG/ACT nasal spray 1 spray by Each Nare route daily 1 Bottle 3    pantoprazole (PROTONIX) 20 MG tablet Take 1 tablet by mouth 2 times daily 60 tablet 5    dicyclomine (BENTYL) 10 MG capsule Take 1 capsule by mouth 4 times daily 120 capsule 3    sucralfate (CARAFATE) 1 GM tablet Take 1 tablet by mouth 4 times daily 120 tablet 5    promethazine (PHENERGAN) 25 MG tablet Take 1 tablet by mouth every 6 hours as needed for Nausea 30 tablet 2    desvenlafaxine (KHEDEZLA) 50 MG TB24 extended SKIN:  Warm, dry. Color good      HEAD:  Normocephalic. Face symmetrical.    EYES:  RUT EOMI and gaze is conjugate Conjunctiva is clear,non icteric. EARS:   Has no ear pain     NOSE:  Nares are patent,  Mucosa is moist .     THROAT:  Pharynx is not erythematous,. Dentition is in good repair No visible dental abcesses on gross exam, Left upper molar areas are not swollen    NECK:  Has no neck pain and no neck swelling . Sara January CHEST:    No use of accessory muscles. HEART:  HT regular and no murmer . LUNGS:  Breath sounds are clear and no wheezes     ABDOMEN: Abdomen is non tender and is soft on palpation. No localized tenderness. No guarding or rigidity. No palpable organomegaly. LYMPHATICS:  No palpable cervical adenopathy    LOCOMOTOR, BACK AND SPINE:  Has no pain to palpate the spine,   No pain on palpation of spine, No scoliosis. EXTREMITIES:   Has no extremity edema and good ROM of all extremities  No calf pain with flexion extension of feet,   Grasp strength is 5/5     NEUROLOGIC:  The patient is sleepy but sits up when asked, He has poor attention span Oriented to day and Month but not time. When prompted he remembers today is Mothers Day  No apparent focal sensory or motor deficits. PROVISIONAL DIAGNOSES:        Depression Suicidal thinking  Active Problems:    Severe major depression ([address]  Resolved Problems:    * No resolved hospital problems.  DON Lester CNP on 5/12/2019 at 9:31 AM

## 2019-05-12 NOTE — CARE COORDINATION
BHI Biopsychosocial Assessment    Current Level of Psychosocial Functioning     Independent   Dependent  X  Minimal Assist     Comments:  PT has prior diagnosis of Major Depressive Disorder, recurrent, without psychotic features; history of anxiety issues; history of suicidal ideations; multiple physical health issues    Psychosocial High Risk Factors (check all that apply)    Unable to obtain meds   Chronic illness/pain    Substance abuse   Lack of Family Support   Financial stress   Isolation X  Inadequate Community Resources  Suicide attempt(s) X  Not taking medications   Victim of crime   Developmental Delay  Unable to manage personal needs  X  Age 72 or older   Homeless  No transportation   Readmission within 30 days  Unemployment  Traumatic Event    Psychiatric Advanced Directives: Nothing reported    Family to Involve in Treatment: Mother involved in treatment as well as other family members    Sexual Orientation:  PT is currently single    Patient Strengths: Income; [employer] [employer]; safe housing with mother; supportive family; linked to [employer] [facility] [employer]    Patient Barriers: Long history of suicidal ideation; multiple psychiatric admissions between [facility] and [address] [place] [address];     Opioid/AOD Referral and Education Provided:  PT reports no substance abuse    CMHC/mental health history: [facility] [place]    Plan of Care   medication management, group/individual therapies, family meetings, psycho -education, treatment team meetings to assist with stabilization    Initial Discharge Plan:  Stabilize on medications and emotional symptoms; return to home at 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   PT is A&O x4; presents with depression as evidence by depressed/sad mood, loss of interest in pleasurable activities, fatigue and insomnia, feelings of worthlessness, hopelessness, inability to concentrate, thoughts of suicide. PT currently denies any thoughts of suicide but does remember telling his mother he wanted to cut his wrist.  PT also remembers being at a party with his family and \"my depression and anxiety started to go up. \"  PT does have a history of suicidal ideation and was recently (two months ago) at the [facility] in [place] but was unable to be admitted because they did not have a bed for him. PT is tired but cooperative throughout assessment. PT currently appears absent of self-harm. PT reports history of depression and anxiety. PT denies all hallucinations/delusions, no legal issues; and, denies any drug use. PT then confirms smokes medical marijuana and drinks, but only socially. PT lives with his mother; father  of a drug overdose and struggling with unresolved grief; close to siblings; and works as a  for a group home in [place]. Therapeutic support, empathetic care and psycho education provided at time of first meeting.

## 2019-05-12 NOTE — GROUP NOTE
Group Therapy Note    Date: May 12    Group Start Time: 1600  Group End Time: 1630  Group Topic: Psychoeducation    [facility] MOHSEN Lee        Group Therapy Note    Attendees: 20/20         Patient's Goal:  Participate appropriately in safety checks    Status After Intervention:  Unchanged    Participation Level:  Active Listener    Participation Quality: Appropriate      Speech:  normal      Thought Process/Content: Logical      Affective Functioning: Congruent      Mood: normal      Level of consciousness:  Alert and Oriented x4      Response to Learning: Able to verbalize current knowledge/experience      Endings: None Reported    Modes of Intervention: Education and Support      Discipline Responsible: Behavorial Health Tech      Signature:  Shaji Lee

## 2019-05-12 NOTE — BH NOTE
`[facility]  Admission Note     Admission Type:   Admission Type: Voluntary    Reason for admission:  Reason for Admission: Pt was having a party with his family when he became overwhelmed with increased anxiety and suicidal ideations with a plan to cut his wrists.  Pt's mom stopped him    PATIENT STRENGTHS:  Strengths: Medication Compliance, Positive Support, Communication    Patient Strengths and Limitations:  Limitations: Tendency to isolate self, Difficulty problem solving/relies on others to help solve problems    Addictive Behavior:   Addictive Behavior  In the past 3 months, have you felt or has someone told you that you have a problem with:  : None  Do you have a history of Chemical Use?: No  Do you have a history of Alcohol Use?: No  Do you have a history of Street Drug Abuse?: Comment(Medical marijuana)  Histroy of Prescripton Drug Abuse?: No    Medical Problems:   Past Medical History:   Diagnosis Date    Acid reflux     Anxiety     Appendicitis     when 11years old    Diverticulitis     History of acute renal failure     due to dehydration    History of stomach ulcers     Irritable bowel syndrome     Ulcer        Status EXAM:  Status and Exam  Normal: No  Facial Expression: Flat  Affect: Congruent  Level of Consciousness: Alert  Mood:Normal: No  Mood: Depressed, Anxious, Helpless  Motor Activity:Normal: Yes  Interview Behavior: Cooperative  Preception: [place] to Person, [place] to Time, [place] to Place, [place] to Situation  Attention:Normal: No  Attention: Distractible  Thought Processes: Circumstantial  Thought Content:Normal: No  Thought Content: Preoccupations  Hallucinations: None  Delusions: No  Memory:Normal: Yes  Insight and Judgment: No  Insight and Judgment: Poor Judgment  Present Suicidal Ideation: No  Present Homicidal Ideation: No    Pt admitted with followings belongings:  Dentures: None  Vision - Corrective Lenses: None  Hearing Aid: None  Jewelry: None  Body Piercings Removed: N/A  Clothing: Socks, Footwear, Jacket / coat, Pants, Shirt, Undergarments (Comment)  Were All Patient Medications Collected?: Yes  Other Valuables: None      Valuables placed in safe in security envelope, number:  [id number] and [id number]. Patient's home medications were reviewed. Patient oriented to surroundings and program expectations and copy of patient rights given. Received admission packet:  yes. Consents reviewed, and patient requested to sign later today. Patient verbalizes an understanding. Pt admitted to the [facility] unit room 231 per provider order under voluntary status. Pt changed into hospital attire. Pt scanned with metal detector. Nourishment provided. The patient presents to the emergency department with suicidal ideation. The patient states he is thinking about slitting his wrists. He says his mother stopped him, tonight. The patient was at a party with his family and became anxious and overwhelmed while cooking. He has a history of depression and anxiety. [facility]. He would like to go to the [address]. The patient reports using medical marijuana, but no other drugs. He has been taking his medications as directed. His family accompanies him. He denies taking drugs in overdose and has not injured himself. MD paged for orders. Will continue to monitor patient for safety and behavior.                   Ashlyn Russ RN

## 2019-05-12 NOTE — PLAN OF CARE
Problem: Depressive Behavior With or Without Suicide Precautions:  Goal: Ability to disclose and discuss suicidal ideas will improve  Description  Ability to disclose and discuss suicidal ideas will improve  5/12/2019 1649 by Pavan Dale  Outcome: Ongoing  Note:   Pt denies any current suicidal ideations upon request this morning. Reports he has times when he feels impulsive and overwhelmed. Has many sporadic times when he has extreme crying and he doesn't know why. Focused on wanting to take multiple showers a shift reporting he just feels he needs to be \"\". Seclusive to room this morning but out more this afternoon. Support and reassurance given. Encouraged to attend unit programing and take medications as prescribed. Agrees to seek out staff as needed and before harming self if negative self harm thoughts arise. Q15 minute checks for safety cont.

## 2019-05-12 NOTE — FLOWSHEET NOTE
Anam Tejeda Patient sleeping     05/12/19 1537   Encounter Summary   Services provided to: Patient   Referral/Consult From: [facility] Parent; Family members   Continue Visiting   (5/12/19)   Complexity of Encounter Low

## 2019-05-13 ENCOUNTER — APPOINTMENT (OUTPATIENT)
Dept: GENERAL RADIOLOGY | Age: 29
DRG: 751 | End: 2019-05-13
Attending: PSYCHIATRY & NEUROLOGY
Payer: MEDICARE

## 2019-05-13 LAB
ABSOLUTE EOS #: 0 K/UL (ref 0–0.4)
ABSOLUTE IMMATURE GRANULOCYTE: ABNORMAL K/UL (ref 0–0.3)
ABSOLUTE LYMPH #: 2.7 K/UL (ref 1–4.8)
ABSOLUTE MONO #: 1.2 K/UL (ref 0.1–1.3)
BASOPHILS # BLD: 1 % (ref 0–2)
BASOPHILS ABSOLUTE: 0.1 K/UL (ref 0–0.2)
DIFFERENTIAL TYPE: ABNORMAL
EOSINOPHILS RELATIVE PERCENT: 0 % (ref 0–4)
HCT VFR BLD CALC: 47.2 % (ref 41–53)
HEMOGLOBIN: 16 G/DL (ref 13.5–17.5)
IMMATURE GRANULOCYTES: ABNORMAL %
LYMPHOCYTES # BLD: 18 % (ref 24–44)
MCH RBC QN AUTO: 29.9 PG (ref 26–34)
MCHC RBC AUTO-ENTMCNC: 33.9 G/DL (ref 31–37)
MCV RBC AUTO: 88.1 FL (ref 80–100)
MONOCYTES # BLD: 8 % (ref 1–7)
NRBC AUTOMATED: ABNORMAL PER 100 WBC
PDW BLD-RTO: 13.5 % (ref 11.5–14.9)
PLATELET # BLD: 449 K/UL (ref 150–450)
PLATELET ESTIMATE: ABNORMAL
PMV BLD AUTO: 7.1 FL (ref 6–12)
RBC # BLD: 5.35 M/UL (ref 4.5–5.9)
RBC # BLD: ABNORMAL 10*6/UL
SEG NEUTROPHILS: 73 % (ref 36–66)
SEGMENTED NEUTROPHILS ABSOLUTE COUNT: 11.6 K/UL (ref 1.3–9.1)
WBC # BLD: 15.7 K/UL (ref 3.5–11)
WBC # BLD: ABNORMAL 10*3/UL

## 2019-05-13 PROCEDURE — 71045 X-RAY EXAM CHEST 1 VIEW: CPT

## 2019-05-13 PROCEDURE — 6370000000 HC RX 637 (ALT 250 FOR IP): Performed by: NURSE PRACTITIONER

## 2019-05-13 PROCEDURE — 99232 SBSQ HOSP IP/OBS MODERATE 35: CPT | Performed by: PSYCHIATRY & NEUROLOGY

## 2019-05-13 PROCEDURE — 1240000000 HC EMOTIONAL WELLNESS R&B

## 2019-05-13 PROCEDURE — 6370000000 HC RX 637 (ALT 250 FOR IP): Performed by: PSYCHIATRY & NEUROLOGY

## 2019-05-13 PROCEDURE — 36415 COLL VENOUS BLD VENIPUNCTURE: CPT

## 2019-05-13 PROCEDURE — 99253 IP/OBS CNSLTJ NEW/EST LOW 45: CPT | Performed by: INTERNAL MEDICINE

## 2019-05-13 PROCEDURE — 85025 COMPLETE CBC W/AUTO DIFF WBC: CPT

## 2019-05-13 RX ORDER — CLONAZEPAM 0.5 MG/1
0.5 TABLET ORAL ONCE
Status: COMPLETED | OUTPATIENT
Start: 2019-05-13 | End: 2019-05-13

## 2019-05-13 RX ORDER — TRAZODONE HYDROCHLORIDE 100 MG/1
100 TABLET ORAL NIGHTLY PRN
Status: DISCONTINUED | OUTPATIENT
Start: 2019-05-13 | End: 2019-05-17 | Stop reason: HOSPADM

## 2019-05-13 RX ORDER — CLONAZEPAM 1 MG/1
1 TABLET ORAL NIGHTLY
Status: DISCONTINUED | OUTPATIENT
Start: 2019-05-13 | End: 2019-05-17 | Stop reason: HOSPADM

## 2019-05-13 RX ORDER — DESVENLAFAXINE 50 MG/1
100 TABLET, EXTENDED RELEASE ORAL DAILY
Status: DISCONTINUED | OUTPATIENT
Start: 2019-05-14 | End: 2019-05-15

## 2019-05-13 RX ORDER — CLONAZEPAM 0.5 MG/1
0.5 TABLET ORAL DAILY
Status: DISCONTINUED | OUTPATIENT
Start: 2019-05-14 | End: 2019-05-17 | Stop reason: HOSPADM

## 2019-05-13 RX ORDER — ONDANSETRON 4 MG/1
4 TABLET, FILM COATED ORAL EVERY 6 HOURS PRN
Status: DISCONTINUED | OUTPATIENT
Start: 2019-05-13 | End: 2019-05-17 | Stop reason: HOSPADM

## 2019-05-13 RX ADMIN — TRAZODONE HYDROCHLORIDE 100 MG: 100 TABLET ORAL at 20:39

## 2019-05-13 RX ADMIN — DICYCLOMINE HYDROCHLORIDE 10 MG: 10 CAPSULE ORAL at 09:10

## 2019-05-13 RX ADMIN — PANTOPRAZOLE SODIUM 20 MG: 20 TABLET, DELAYED RELEASE ORAL at 08:19

## 2019-05-13 RX ADMIN — FLUTICASONE PROPIONATE 1 SPRAY: 50 SPRAY, METERED NASAL at 09:10

## 2019-05-13 RX ADMIN — VITAMIN D, TAB 1000IU (100/BT) 2000 UNITS: 25 TAB at 09:09

## 2019-05-13 RX ADMIN — ACETAMINOPHEN 650 MG: 325 TABLET, FILM COATED ORAL at 09:33

## 2019-05-13 RX ADMIN — DICYCLOMINE HYDROCHLORIDE 10 MG: 10 CAPSULE ORAL at 14:50

## 2019-05-13 RX ADMIN — SUCRALFATE 1 G: 1 TABLET ORAL at 20:39

## 2019-05-13 RX ADMIN — DICYCLOMINE HYDROCHLORIDE 10 MG: 10 CAPSULE ORAL at 20:39

## 2019-05-13 RX ADMIN — SUCRALFATE 1 G: 1 TABLET ORAL at 09:09

## 2019-05-13 RX ADMIN — PROMETHAZINE HYDROCHLORIDE 25 MG: 25 TABLET ORAL at 16:31

## 2019-05-13 RX ADMIN — TRAZODONE HYDROCHLORIDE 100 MG: 100 TABLET ORAL at 22:21

## 2019-05-13 RX ADMIN — SUCRALFATE 1 G: 1 TABLET ORAL at 14:50

## 2019-05-13 RX ADMIN — PRAZOSIN HYDROCHLORIDE 2 MG: 1 CAPSULE ORAL at 20:39

## 2019-05-13 RX ADMIN — HYDROXYZINE HYDROCHLORIDE 50 MG: 50 TABLET, FILM COATED ORAL at 09:09

## 2019-05-13 RX ADMIN — HYDROXYZINE HYDROCHLORIDE 50 MG: 50 TABLET, FILM COATED ORAL at 14:52

## 2019-05-13 RX ADMIN — PANTOPRAZOLE SODIUM 20 MG: 20 TABLET, DELAYED RELEASE ORAL at 16:34

## 2019-05-13 RX ADMIN — DESVENLAFAXINE SUCCINATE 50 MG: 50 TABLET, FILM COATED, EXTENDED RELEASE ORAL at 09:10

## 2019-05-13 RX ADMIN — OLANZAPINE 5 MG: 5 TABLET, FILM COATED ORAL at 16:28

## 2019-05-13 RX ADMIN — DICYCLOMINE HYDROCHLORIDE 10 MG: 10 CAPSULE ORAL at 18:00

## 2019-05-13 RX ADMIN — CLONAZEPAM 0.5 MG: 0.5 TABLET ORAL at 17:58

## 2019-05-13 RX ADMIN — CLONAZEPAM 1 MG: 1 TABLET ORAL at 20:39

## 2019-05-13 RX ADMIN — SUCRALFATE 1 G: 1 TABLET ORAL at 18:01

## 2019-05-13 ASSESSMENT — PAIN SCALES - GENERAL: PAINLEVEL_OUTOF10: 5

## 2019-05-13 NOTE — FLOWSHEET NOTE
05/13/19 1514   Encounter Summary   Services provided to: Patient   Referral/Consult From: Rounding   Continue Visiting   (5-13-19)   Complexity of Encounter Low   Length of Encounter 15 minutes   Routine   Type Initial   Spiritual/Pentecostal   Type Ritual   Intervention Anointing   Sacraments   Sacrament of Sick-Anointing Anointed  (Gayla Shirley 5-13-19)

## 2019-05-13 NOTE — FLOWSHEET NOTE
Patient participated in Spirituality Group.     05/13/19 3377   Encounter Summary   Services provided to: Patient   Referral/Consult From: Rounding   Continue Visiting   (5/13/19)   Complexity of Encounter Low   Length of Encounter 15 minutes   Spiritual/Mandaeism   Type Spiritual support   Assessment Calm   Intervention Active listening   Outcome Receptive

## 2019-05-13 NOTE — PLAN OF CARE
[facility]  Day 3 Interdisciplinary Treatment Plan NOTE    Review Date & Time: 5/13/2019  1324    Admission Type:   Admission Type: Voluntary    Reason for admission:  Reason for Admission: Pt was having a party with his family when he became overwhelmed with increased anxiety and suicidal ideations with a plan to cut his wrists.  Pt's mom stopped him  Estimated Length of Stay: 5-7 days  Estimated Discharge Date Update: to be determined by physician    PATIENT STRENGTHS:  Patient Strengths Strengths: Connection to output provider, No significant Physical Illness, Positive Support, Medication Compliance  Patient Strengths and Limitations:Limitations: Tendency to isolate self, Difficulty problem solving/relies on others to help solve problems, Multiple barriers to leisure interests, General negative or hopeless attitude about future/recovery  Addictive Behavior:Addictive Behavior  In the past 3 months, have you felt or has someone told you that you have a problem with:  : None  Do you have a history of Chemical Use?: No  Do you have a history of Alcohol Use?: No  Do you have a history of Street Drug Abuse?: No  Histroy of Prescripton Drug Abuse?: No  Medical Problems:  Past Medical History:   Diagnosis Date    Acid reflux     Anxiety     Appendicitis     when 11years old    Diverticulitis     History of acute renal failure     due to dehydration    History of stomach ulcers     Irritable bowel syndrome     Ulcer        Risk:  Fall RiskTotal: 81  Latrell Scale Latrell Scale Score: 22  BVC Total: 0  Change in scores no Changes to plan of Care no    Status EXAM:   Status and Exam  Normal: No  Facial Expression: Flat, Sad, Worried  Affect: Blunt  Level of Consciousness: Alert  Mood:Normal: No  Mood: Depressed, Anxious, Sad  Motor Activity:Normal: Yes  Interview Behavior: Cooperative  Preception: [place] to Person, [place] to Time, [place] to Place, [place] to Situation  Attention:Normal: No  Attention: Distractible  Thought Processes: Circumstantial  Thought Content:Normal: No  Thought Content: Compulsions(takes many showers to calm anxiety)  Hallucinations: None  Delusions: No  Memory:Normal: Yes  Insight and Judgment: No  Insight and Judgment: Poor Judgment, Poor Insight  Present Suicidal Ideation: Other(See comment)(fleeting but contracts for safety)  Present Homicidal Ideation: No    Daily Assessment Last Entry:   Daily Sleep (WDL): Within Defined Limits         Patient Currently in Pain: Denies  Daily Nutrition (WDL): Within Defined Limits    Patient Monitoring:  Frequency of Checks: 4 times per hour, close    Psychiatric Symptoms:   Depression Symptoms  Depression Symptoms: Feelings of helplessness, Feelings of hopelessess, Sleep disturbance, Isolative  Anxiety Symptoms  Anxiety Symptoms: Panic attack, Generalized, Ritualistic behavior(takes many showers to ease anxiety)  Lulú Symptoms  Lulú Symptoms: No problems reported or observed. Psychosis Symptoms  Delusion Type: (denies)    Suicide Risk CSSR-S:  1) Within the past month, have you wished you were dead or wished you could go to sleep and not wake up? : Yes  2) Have you actually had any thoughts of killing yourself? : Yes  3) Have you been thinking about how you might kill yourself? : Yes  5) Have you started to work out or worked out the details of how to kill yourself?  Do you intend to carry out this plan? : No  6) Have you ever done anything, started to do anything, or prepared to do anything to end your life?: No  Change in Result nate Change in Plan of care no      EDUCATION:   EDUCATION:   Learner Progress Toward Treatment Goals: Reviewed results and recommendations of this team, Reviewed group plan and strategies, Reviewed signs, symptoms and risk of self harm and violent behavior, Reviewed goals and plan of care    Method:small group, individual verbal education    Outcome:verbalized by patient, but needs reinforcement to obtain goals    PATIENT GOALS:  Short term: pt is unable to identify a short term goal  Long term: pt is unable identify a long term goal     PLAN/TREATMENT RECOMMENDATIONS UPDATE: continue with group therapies, increased socialization, continue planning for after discharge goals, continue with medication compliance    SHORT-TERM GOALS UPDATE:   Time frame for Short-Term Goals: 5-7 days    LONG-TERM GOALS UPDATE:   Time frame for Long-Term Goals: 6 months  Members Present in Team Meeting: See Signature Sheet    YU Fernandez Referred To Asc For Closure Text (Leave Blank If You Do Not Want): After obtaining clear surgical margins the patient was sent to an ASC for surgical repair.  The patient understands they will receive post-surgical care and follow-up from the ASC physician.

## 2019-05-13 NOTE — CONSULTS
[facility] / HISTORY AND PHYSICAL EXAMINATION            Date:   5/13/2019  Patient name:  Wilma Serna  Date of admission:  5/12/2019  6:03 AM  MRN:   [id number]  Account:  [de-identified]  YOB: 1990  PCP:    Danii Fitzgerald MD  Room:   210/3725-93  Code Status:    Full Code    Physician Requesting Consult: Su Tafoya MD    Reason for Consult:  Elevated WBC     Chief Complaint:     No chief complaint on file. History Obtained From:     patient, electronic medical record    History of Present Illness:   Patient was admitted with major depression, internal medicine is consulted with elevated white count. Patient has marijuana abuse, he has multiple episodes of vomiting's, patient is taking bath ,  multiple times. He quit smoking 3 months back, he was a heavy smoker, noticed cough with brownish sputum, going on for last 3 months. Past Medical History:     Past Medical History:   Diagnosis Date    Acid reflux     Anxiety     Appendicitis     when 11years old    Diverticulitis     History of acute renal failure     due to dehydration    History of stomach ulcers     Irritable bowel syndrome     Ulcer         Past Surgical History:     Past Surgical History:   Procedure Laterality Date    APPENDECTOMY      COLONOSCOPY      UPPER GASTROINTESTINAL ENDOSCOPY      WISDOM TOOTH EXTRACTION          Medications Prior to Admission:     Prior to Admission medications    Medication Sig Start Date End Date Taking?  Authorizing Provider   Cholecalciferol (VITAMIN D3) 2000 units CAPS Take by mouth daily    Historical Provider, MD   propranolol (INDERAL) 10 MG tablet Take 1 tablet by mouth daily 4/5/19   Danii Fitzgerald MD   desvenlafaxine succinate (PRISTIQ) 50 MG TB24 extended release tablet Take 50 mg by mouth 3/27/19 4/26/19  Historical Provider, MD   fluticasone (FLONASE) 50 MCG/ACT nasal spray 1 spray by Each Nare route daily Great-uncle    Crohn's Disease Maternal Uncle     Other Father         drug overdose       Review of Systems:     Positive and Negative as described in HPI. CONSTITUTIONAL:  negative for fevers, chills, sweats, fatigue, weight loss  HEENT:  negative for vision, hearing changes, runny nose, throat pain  RESPIRATORY:  Positive for cough with brownish sputum. CARDIOVASCULAR:  negative for chest pain, palpitations. GASTROINTESTINAL:  negative for nausea, vomiting, diarrhea, constipation, change in bowel habits, abdominal pain   GENITOURINARY:  negative for difficulty of urination, burning with urination, frequency   INTEGUMENT:  negative for rash, skin lesions, easy bruising   HEMATOLOGIC/LYMPHATIC:  negative for swelling/edema   ALLERGIC/IMMUNOLOGIC:  negative for urticaria , itching  ENDOCRINE:  negative increase in drinking, increase in urination, hot or cold intolerance  MUSCULOSKELETAL:  negative joint pains, muscle aches, swelling of joints  NEUROLOGICAL:  negative for headaches, dizziness, lightheadedness, numbness, pain, tingling extremities  BEHAVIOR/PSYCH:  Positive for depression    Physical Exam:     /70   Pulse 102   Temp 97.6 °F (36.4 °C) (Oral)   Resp 14   Ht 5' 10\" (1.778 m)   Wt 209 lb (94.8 kg)   BMI 29.99 kg/m²   Temp (24hrs), Av.3 °F (36.3 °C), Min:96.9 °F (36.1 °C), Max:97.6 °F (36.4 °C)    No results for input(s): POCGLU in the last 72 hours. No intake or output data in the 24 hours ending 19    General Appearance:  alert, well appearing, and in no acute distress  Mental status: oriented to person, place, and time with normal affect  Head:  normocephalic, atraumatic.   Eye: no icterus, redness, pupils equal and reactive, extraocular eye movements intact, conjunctiva clear  Ear: normal external ear, no discharge, hearing intact  Nose:  no drainage noted  Mouth: mucous membranes moist  Neck: supple, no carotid bruits, thyroid not palpable  Lungs: Bilateral equal air entry, clear to ausculation, no wheezing, rales or rhonchi, normal effort  Cardiovascular: normal rate, regular rhythm, no murmur, gallop, rub. Abdomen: Soft, nontender, nondistended, normal bowel sounds, no hepatomegaly or splenomegaly  Neurologic: There are no new focal motor or sensory deficits, normal muscle tone and bulk, no abnormal sensation, normal speech, cranial nerves II through XII grossly intact  Skin: No gross lesions, rashes, bruising or bleeding on exposed skin area  Extremities:  peripheral pulses palpable, no pedal edema or calf pain with palpation  Psych: normal affect     Investigations:      Laboratory Testing:  Recent Results (from the past 24 hour(s))   CBC with DIFF    Collection Time: 05/13/19 12:48 PM   Result Value Ref Range    WBC 15.7 (H) 3.5 - 11.0 k/uL    RBC 5.35 4.5 - 5.9 m/uL    Hemoglobin 16.0 13.5 - 17.5 g/dL    Hematocrit 47.2 41 - 53 %    MCV 88.1 80 - 100 fL    MCH 29.9 26 - 34 pg    MCHC 33.9 31 - 37 g/dL    RDW 13.5 11.5 - 14.9 %    Platelets 128 988 - 382 k/uL    MPV 7.1 6.0 - 12.0 fL    NRBC Automated NOT REPORTED per 100 WBC    Differential Type NOT REPORTED     Seg Neutrophils 73 (H) 36 - 66 %    Lymphocytes 18 (L) 24 - 44 %    Monocytes 8 (H) 1 - 7 %    Eosinophils % 0 0 - 4 %    Basophils 1 0 - 2 %    Immature Granulocytes NOT REPORTED 0 %    Segs Absolute 11.60 (H) 1.3 - 9.1 k/uL    Absolute Lymph # 2.70 1.0 - 4.8 k/uL    Absolute Mono # 1.20 0.1 - 1.3 k/uL    Absolute Eos # 0.00 0.0 - 0.4 k/uL    Basophils # 0.10 0.0 - 0.2 k/uL    Absolute Immature Granulocyte NOT REPORTED 0.00 - 0.30 k/uL    WBC Morphology NOT REPORTED     RBC Morphology NOT REPORTED     Platelet Estimate NOT REPORTED        Imaging/Diagonstics:      Assessment :      Primary Problem  Severe major depression [facility])    Active Hospital Problems    Diagnosis Date Noted    Severe major depression ([facility] [F32.2] 05/12/2019       Plan:     1.  Elevated white count, likely secondary to excessive vomiting. I suspect patient has cyclic vomiting syndrome. Improving   2.  Patient does not have any local signs of infection, ordering chest x-ray with history of cough    Consultations:   Ramila Lau MD  5/13/2019  7:19 PM    Copy sent to Dr. Daryle Ok, MD

## 2019-05-13 NOTE — GROUP NOTE
Group Therapy Note    Date: May 13    Group Start Time: 1100  Group End Time: 3549  Group Topic: Cognitive Skills    SELINA Kendrick    Pt did not attend 1100 skills group d/t resting in room despite staff invitation to attend. 1:1 talk time offered as alternative to group session, pt declined.        Signature:  Kareen Loyola

## 2019-05-13 NOTE — GROUP NOTE
Group Therapy Note    Date: May 13    Group Start Time: 1430  Group End Time: 6281  Group Topic: Cognitive Skills    [facility]    Jose Carlos Cox, [address]      Attendees: 12         Patient's Goal:  Improve decision making skills, improve concentration     Status After Intervention:  Improved    Participation Level:  Active Listener and Interactive    Participation Quality: Appropriate, Attentive, Sharing and Supportive      Speech:  normal      Thought Process/Content: Logical      Affective Functioning: Congruent        Level of consciousness:  Alert, Oriented x4 and Attentive      Response to Learning: Able to verbalize current knowledge/experience, Able to verbalize/acknowledge new learning and Progressing to goal      Endings: None Reported    Modes of Intervention: Education, Support, Socialization and Activity      Discipline Responsible: Psychoeducational Specialist      Signature:  Hector Villarreal

## 2019-05-13 NOTE — BH NOTE
Spoke with Dr. Blanca Valenzuela, he had me call pharmacy and verify Klonopin dose. [facility] [place] [phone]; Klonopin 1mg BID. Dr. Blanca Valenzuela said to order Klonopin 5mg daily, once now and Klonopin 1mg at night.

## 2019-05-13 NOTE — BH NOTE
Patient came up to nurse very anxious. Unable to catch his breath. Attempted to have patient breath through his nose and let out through his mouth and visualize a pleasant place. Patient states this is what happens to him at home. Patient went to room to try to relax. Patient called up to nurses station. Patient c/o intense headache, rocking back and forth. Stating he wanted to take another shower but had to wait until group time was over. Asked patient if this also happened at home, he stated it did and he took tylenol. Advised him I would get Tylenol and meet him at the nurses station and gather towels for him to take a shower. By that time, group would be over. He took his tylenol and shower. He came up afterward and thanked the nurse, stating \"I feel much better, thank you for being patient and allowing me to take another shower. \" \"I'm just going to walk around the unit. \" Nurse stated that was ok and just to let her know if he needed anything else.

## 2019-05-13 NOTE — GROUP NOTE
Group Therapy Note    Date: May 13    Group Start Time: 1000  Group End Time: 7113  Group Topic: Psychotherapy    WADE Valadez        Group Therapy Note  Pt declined to attend psychotherapy at 1000 am despite encouragement. Pt offered 1:1 and refused.        Signature:  WADE Mead
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
## 2019-05-13 NOTE — PROGRESS NOTES
Department of Psychiatry  Attending Physician Progress Note    Chief Complaint: Severe major depression ([address]     SUBJECTIVE:     The patient was feeling depressed and anxious this morning. He had a panic attack and was crying earlier this morning but felt better after taking a shower. He said he takes 5-6 hours a day at home to feel better. His sleep was difficult yesterday. He requested increasing trazodone to 100 mg. He continues to have suicidal ideations. There was no major side effects. There is no safe alternative other than the hospital treatment at this time. OBJECTIVE    Physical  VITALS:    /70   Pulse 102   Temp 97.6 °F (36.4 °C) (Oral)   Resp 14   Ht 5' 10\" (1.778 m)   Wt 209 lb (94.8 kg)   BMI 29.99 kg/m²     Mental Status Examination:    Level of consciousness:  Within normal limits  Appearance: Street clothes, seated, with good grooming  Behavior/Motor: No abnormalities noted  Attitude toward examiner:  Cooperative, attentive, good eye contact  Speech:  spontaneous, normal rate, normal volume and well articulated  Mood:  Depressed   Affect:  Mood-congruent, constricted in range. Thought processes:  linear, goal-directed and coherent  Thought content:  denies homicidal ideation  Suicidal Ideation:  less suicidal ideation  Delusions:  no evidence of delusions  Perceptual Disturbance:  No visual or auditory hallucinations. Cognition:  Intact  Memory: grossly intact.   Insight & Judgement: partial       Medications  Current Facility-Administered Medications: [START ON 5/14/2019] desvenlafaxine succinate (PRISTIQ) extended release tablet 100 mg, 100 mg, Oral, Daily  traZODone (DESYREL) tablet 100 mg, 100 mg, Oral, Nightly PRN  nicotine polacrilex (NICORETTE) gum 2 mg, 2 mg, Oral, PRN  acetaminophen (TYLENOL) tablet 650 mg, 650 mg, Oral, Q4H PRN  magnesium hydroxide (MILK OF MAGNESIA) 400 MG/5ML suspension 30 mL, 30 mL, Oral, Daily PRN  benztropine mesylate (COGENTIN) injection 2 mg, 2 mg, Intramuscular, BID PRN  haloperidol lactate (HALDOL) injection 5 mg, 5 mg, Intramuscular, Q4H PRN  vitamin D (CHOLECALCIFEROL) tablet 2,000 Units, 2,000 Units, Oral, Daily  clonazePAM (KLONOPIN) tablet 1 mg, 1 mg, Oral, Nightly  pantoprazole (PROTONIX) tablet 20 mg, 20 mg, Oral, BID AC  dicyclomine (BENTYL) capsule 10 mg, 10 mg, Oral, 4x Daily  sucralfate (CARAFATE) tablet 1 g, 1 g, Oral, 4x Daily  promethazine (PHENERGAN) tablet 25 mg, 25 mg, Oral, Q6H PRN  fluticasone (FLONASE) 50 MCG/ACT nasal spray 1 spray, 1 spray, Each Nare, Daily  prazosin (MINIPRESS) capsule 2 mg, 2 mg, Oral, Nightly  OLANZapine (ZYPREXA) tablet 5 mg, 5 mg, Oral, BID PRN  hydrOXYzine (ATARAX) tablet 50 mg, 50 mg, Oral, TID PRN    ASSESSMENT     Principal Problem:    Severe major depression ([address]  Resolved Problems:    * No resolved hospital problems. *      PLAN    · Celexa, Remeron and Zyprexa were stopped on admission. The patient is anxious about major medication changes. · Pristiq was restarted. We will increase the dose to 100 mg daily. · Increase trazodone to 100 mg at bedtime. · Continue clonazepam.  · Continue unit milieu and group psychotherapy.     Electronically Signed by Dwayne Kim MD , 5/13/2019 10:46 AM

## 2019-05-13 NOTE — PLAN OF CARE
Problem: Depressive Behavior With or Without Suicide Precautions:  Goal: Able to verbalize and/or display a decrease in depressive symptoms  Description  Able to verbalize and/or display a decrease in depressive symptoms  5/12/2019 2328 by Hemanth Horner RN  Note:   Pt rates anxiety a 7 but states he always has anxiety. PT states he does having fleeting thoughts of suicide but denies any plan or intent pt agreeable to seek out staff if thoughts become overwhelming. PT states he has a hard time during family function due to the loss of his father and feels his nieces birthday triggered this episode. PT is out and social on the unit. PT maintained on 15 min safety checks and rounds at irregular intervals. Problem: Depressive Behavior With or Without Suicide Precautions:  Goal: Ability to disclose and discuss suicidal ideas will improve  Description  Ability to disclose and discuss suicidal ideas will improve  5/12/2019 1649 by Genny Clemons  Outcome: Ongoing  Note:   Pt denies any current suicidal ideations upon request this morning. Reports he has times when he feels impulsive and overwhelmed. Has many sporadic times when he has extreme crying and he doesn't know why. Focused on wanting to take multiple showers a shift reporting he just feels he needs to be \"\". Seclusive to room this morning but out more this afternoon. Support and reassurance given. Encouraged to attend unit programing and take medications as prescribed. Agrees to seek out staff as needed and before harming self if negative self harm thoughts arise. Q15 minute checks for safety cont.

## 2019-05-14 PROCEDURE — 6370000000 HC RX 637 (ALT 250 FOR IP): Performed by: INTERNAL MEDICINE

## 2019-05-14 PROCEDURE — 6370000000 HC RX 637 (ALT 250 FOR IP): Performed by: PSYCHIATRY & NEUROLOGY

## 2019-05-14 PROCEDURE — 6360000002 HC RX W HCPCS: Performed by: NURSE PRACTITIONER

## 2019-05-14 PROCEDURE — 6370000000 HC RX 637 (ALT 250 FOR IP): Performed by: NURSE PRACTITIONER

## 2019-05-14 PROCEDURE — 99231 SBSQ HOSP IP/OBS SF/LOW 25: CPT | Performed by: INTERNAL MEDICINE

## 2019-05-14 PROCEDURE — 99232 SBSQ HOSP IP/OBS MODERATE 35: CPT | Performed by: PSYCHIATRY & NEUROLOGY

## 2019-05-14 PROCEDURE — 1240000000 HC EMOTIONAL WELLNESS R&B

## 2019-05-14 RX ADMIN — BENZTROPINE MESYLATE 2 MG: 1 INJECTION INTRAMUSCULAR; INTRAVENOUS at 02:09

## 2019-05-14 RX ADMIN — CLONAZEPAM 0.5 MG: 0.5 TABLET ORAL at 08:22

## 2019-05-14 RX ADMIN — DICYCLOMINE HYDROCHLORIDE 10 MG: 10 CAPSULE ORAL at 08:22

## 2019-05-14 RX ADMIN — TRAZODONE HYDROCHLORIDE 100 MG: 100 TABLET ORAL at 21:20

## 2019-05-14 RX ADMIN — DESVENLAFAXINE SUCCINATE 100 MG: 50 TABLET, FILM COATED, EXTENDED RELEASE ORAL at 08:22

## 2019-05-14 RX ADMIN — PRAZOSIN HYDROCHLORIDE 2 MG: 1 CAPSULE ORAL at 21:20

## 2019-05-14 RX ADMIN — SUCRALFATE 1 G: 1 TABLET ORAL at 09:20

## 2019-05-14 RX ADMIN — PANTOPRAZOLE SODIUM 20 MG: 20 TABLET, DELAYED RELEASE ORAL at 17:04

## 2019-05-14 RX ADMIN — HYDROXYZINE HYDROCHLORIDE 50 MG: 50 TABLET, FILM COATED ORAL at 19:00

## 2019-05-14 RX ADMIN — SUCRALFATE 1 G: 1 TABLET ORAL at 13:20

## 2019-05-14 RX ADMIN — DICYCLOMINE HYDROCHLORIDE 10 MG: 10 CAPSULE ORAL at 21:20

## 2019-05-14 RX ADMIN — VITAMIN D, TAB 1000IU (100/BT) 2000 UNITS: 25 TAB at 08:22

## 2019-05-14 RX ADMIN — PANTOPRAZOLE SODIUM 20 MG: 20 TABLET, DELAYED RELEASE ORAL at 08:22

## 2019-05-14 RX ADMIN — DICYCLOMINE HYDROCHLORIDE 10 MG: 10 CAPSULE ORAL at 18:16

## 2019-05-14 RX ADMIN — ONDANSETRON HYDROCHLORIDE 4 MG: 4 TABLET, FILM COATED ORAL at 08:28

## 2019-05-14 RX ADMIN — FLUTICASONE PROPIONATE 1 SPRAY: 50 SPRAY, METERED NASAL at 09:21

## 2019-05-14 RX ADMIN — HYDROXYZINE HYDROCHLORIDE 50 MG: 50 TABLET, FILM COATED ORAL at 16:05

## 2019-05-14 RX ADMIN — DICYCLOMINE HYDROCHLORIDE 10 MG: 10 CAPSULE ORAL at 13:20

## 2019-05-14 RX ADMIN — ACETAMINOPHEN 650 MG: 325 TABLET, FILM COATED ORAL at 19:39

## 2019-05-14 RX ADMIN — HALOPERIDOL LACTATE 5 MG: 5 INJECTION INTRAMUSCULAR at 01:44

## 2019-05-14 RX ADMIN — SUCRALFATE 1 G: 1 TABLET ORAL at 21:20

## 2019-05-14 RX ADMIN — SUCRALFATE 1 G: 1 TABLET ORAL at 18:17

## 2019-05-14 RX ADMIN — HYDROXYZINE HYDROCHLORIDE 50 MG: 50 TABLET, FILM COATED ORAL at 08:22

## 2019-05-14 RX ADMIN — CLONAZEPAM 1 MG: 1 TABLET ORAL at 21:21

## 2019-05-14 ASSESSMENT — PAIN SCALES - GENERAL
PAINLEVEL_OUTOF10: 0
PAINLEVEL_OUTOF10: 3

## 2019-05-14 NOTE — PROGRESS NOTES
Department of Psychiatry  Attending Physician Progress Note    Chief Complaint: Severe major depression ([address]     SUBJECTIVE:     The patient is feeling somewhat better today. He had an episode ofacute anxiety and agitation last night and received Haldol and Cogentin. He slept better after that. He would like to try. The night. No panic attacks today so far. He had suicidal ideations yesterday when he had a panic attack but not today so far. There was no major side effects. There is no safe alternative other than the hospital treatment at this time. OBJECTIVE    Physical  VITALS:    /86   Pulse 131   Temp 98.2 °F (36.8 °C) (Oral)   Resp 14   Ht 5' 10\" (1.778 m)   Wt 209 lb (94.8 kg)   BMI 29.99 kg/m²     Mental Status Examination:    Level of consciousness:  Within normal limits  Appearance: Street clothes, seated, with good grooming  Behavior/Motor: No abnormalities noted  Attitude toward examiner:  Cooperative, attentive, good eye contact  Speech:  spontaneous, normal rate, normal volume and well articulated  Mood:  Depressed   Affect:  Mood-congruent, constricted in range. Thought processes:  linear, goal-directed and coherent  Thought content:  denies homicidal ideation  Suicidal Ideation:  less suicidal ideation  Delusions:  no evidence of delusions  Perceptual Disturbance:  No visual or auditory hallucinations. Cognition:  Intact  Memory: grossly intact.   Insight & Judgement: partial       Medications  Current Facility-Administered Medications: desvenlafaxine succinate (PRISTIQ) extended release tablet 100 mg, 100 mg, Oral, Daily  traZODone (DESYREL) tablet 100 mg, 100 mg, Oral, Nightly PRN  ondansetron (ZOFRAN) tablet 4 mg, 4 mg, Oral, Q6H PRN  clonazePAM (KLONOPIN) tablet 0.5 mg, 0.5 mg, Oral, Daily  clonazePAM (KLONOPIN) tablet 1 mg, 1 mg, Oral, Nightly  nicotine polacrilex (NICORETTE) gum 2 mg, 2 mg, Oral, PRN  acetaminophen (TYLENOL) tablet 650 mg, 650 mg, Oral, Q4H PRN  magnesium hydroxide (MILK OF MAGNESIA) 400 MG/5ML suspension 30 mL, 30 mL, Oral, Daily PRN  benztropine mesylate (COGENTIN) injection 2 mg, 2 mg, Intramuscular, BID PRN  vitamin D (CHOLECALCIFEROL) tablet 2,000 Units, 2,000 Units, Oral, Daily  pantoprazole (PROTONIX) tablet 20 mg, 20 mg, Oral, BID AC  dicyclomine (BENTYL) capsule 10 mg, 10 mg, Oral, 4x Daily  sucralfate (CARAFATE) tablet 1 g, 1 g, Oral, 4x Daily  promethazine (PHENERGAN) tablet 25 mg, 25 mg, Oral, Q6H PRN  fluticasone (FLONASE) 50 MCG/ACT nasal spray 1 spray, 1 spray, Each Nare, Daily  prazosin (MINIPRESS) capsule 2 mg, 2 mg, Oral, Nightly  OLANZapine (ZYPREXA) tablet 5 mg, 5 mg, Oral, BID PRN  hydrOXYzine (ATARAX) tablet 50 mg, 50 mg, Oral, TID PRN    ASSESSMENT     Principal Problem:    Severe major depression ([address]  Resolved Problems:    * No resolved hospital problems. *      PLAN    · We will try a low-dose Benadryl at night to help with insomnia. · We increased Pristiq to 100 mg daily. · We increased trazodone to 100 mg at bedtime. · Continue clonazepam at 0.5+1 mg daily which is a lower dose than what he was taking at home. · Continue unit milieu and group psychotherapy.     Electronically Signed by Basil Cid MD , 5/14/2019 7:07 PM

## 2019-05-14 NOTE — GROUP NOTE
Group Therapy Note    Date: May 14    Group Start Time: 1110  Group End Time: 5792  Group Topic: Recreational    [facility]    Shy Montgomery    patient refused to attend decision making/communication skills/leisure group at 1110 after encouragement from staff.   1:1 talk time provided as alternative to group session        Signature:  Shy Montgomery

## 2019-05-14 NOTE — BH NOTE
Haldol given at 0144 for agitation. Patient pacing and becoming increasingly agitated. Haldol offered and patient accepted.

## 2019-05-14 NOTE — GROUP NOTE
Group Therapy Note     Date: May 14, 2019     Group Start Time: 1340  Group End Time: 4378  Group Topic: Cognitive Skills     POLINA Dueñas, CTRS           Group Therapy Note     Pt refused to participate in Cognitive Skills Group at 1330 when encouraged by staff. Pt also refused offer of 1:1 with staff as replacement for group session.            Discipline Responsible: Psychoeducational Specialist        Signature:  Swati Goff

## 2019-05-14 NOTE — PLAN OF CARE
Problem: Suicide risk  Goal: Provide patient with safe environment  Description  Provide patient with safe environment  5/13/2019 2136 by Melissa Clark RN  Outcome: Ongoing  Note:   Environment assessed for safety     Problem: Depressive Behavior With or Without Suicide Precautions:  Goal: Able to verbalize and/or display a decrease in depressive symptoms  Description  Able to verbalize and/or display a decrease in depressive symptoms  Outcome: Ongoing  Note:   Patient voiced depression

## 2019-05-14 NOTE — GROUP NOTE
Group Therapy Note    Date: May 14    Group Start Time: 0900  Group End Time: 5207  Group Topic: Community Meeting    [facility]    Morsealex Chaudhari    patient refused to attend community meeting group at 0900 after encouragement from staff.   1:1 talk time provided as alternative to group session        Signature:  Alfonso Chaudhari

## 2019-05-14 NOTE — PLAN OF CARE
patient refused to attend psychotherapy group at 1000 after encouragement from staff.   1:1 talk time provided as alternative to group session and refused

## 2019-05-14 NOTE — BH NOTE
PT has been expressing increased anxiety, restlessness, increased sweating, r/t nightmares from a death of a friend. PT has been unable to sleep at all so far in shift. PT attempted 1:1 convo, + Shower (per pt stating they calm him down) and had little effect. PT came to desk, asking for PRN, and his RN gave Haldol IM. PT reported never reviving Haldol to the best of his knowledge. About 20 minutes later pt hit call light, C/o increased cramping/locking up in his bilat legs, feet, back, arms, hands. PT had increased R/R, increased sweating, PT expressed that he has never felt this way. RN (Beth Rodríguez) attempted 1:1 convo was with breathing exercises to slow RR rate down, PT was able to lower his RR however stated the cramping was getting worse. RN assessed PT and his calfs, hands, arms, fingers were locked up with almost no ability to spread his fingers. VS were taken. PT stated the cramping was still increasing and very painful. Writer (RN) concerned with any acute dystonia so RN gave Cogentin 2mg IM. PT reported relief of muscle contractions within 5 minutes and stated he felt \"tremendously better with contractions almost completely gone. \"  As Beth Rodríguez was writing NP the other RN reviewed his past charting. iIt showed PT had received \"A/Haldol w/ Benadryl\" in the er upon admit. RN made NP aware and NP agreed with RN's decision, stating if s/s return to give IM benadryl 25mg. RN checked on PT as pt c/o of s/s returning during note, however PT is actually sleeping/snoring at this time, and has been sleeping since the incident.

## 2019-05-14 NOTE — PROGRESS NOTES
[address] [facility] / HISTORY AND PHYSICAL EXAMINATION            Date:   5/14/2019  Patient name:  Wilberto Moore  Date of admission:  5/12/2019  6:03 AM  MRN:   [id number]  Account:  [de-identified]  YOB: 1990  PCP:    Ravindra Betancur MD  Room:   2448/0352-90  Code Status:    Full Code    Physician Requesting Consult: Jose Guerrero MD    Reason for Consult:  Elevated WBC     Chief Complaint:     No chief complaint on file. History Obtained From:     patient, electronic medical record    History of Present Illness:   Patient was admitted with major depression, internal medicine is consulted with elevated white count. Patient has marijuana abuse, he has multiple episodes of vomiting's, patient is taking bath ,  multiple times. He quit smoking 3 months back, he was a heavy smoker, noticed cough with brownish sputum, going on for last 3 months. Past Medical History:     Past Medical History:   Diagnosis Date    Acid reflux     Anxiety     Appendicitis     when 11years old    Diverticulitis     History of acute renal failure     due to dehydration    History of stomach ulcers     Irritable bowel syndrome     Ulcer         Past Surgical History:     Past Surgical History:   Procedure Laterality Date    APPENDECTOMY      COLONOSCOPY      UPPER GASTROINTESTINAL ENDOSCOPY      WISDOM TOOTH EXTRACTION          Medications Prior to Admission:     Prior to Admission medications    Medication Sig Start Date End Date Taking?  Authorizing Provider   Cholecalciferol (VITAMIN D3) 2000 units CAPS Take by mouth daily    Historical Provider, MD   propranolol (INDERAL) 10 MG tablet Take 1 tablet by mouth daily 4/5/19   Ravindra Betancur MD   desvenlafaxine succinate (PRISTIQ) 50 MG TB24 extended release tablet Take 50 mg by mouth 3/27/19 4/26/19  Historical Provider, MD   fluticasone (FLONASE) 50 MCG/ACT nasal spray 1 spray by Each Nare route daily 4/3/19   Brandi Brady MD   pantoprazole (PROTONIX) 20 MG tablet Take 1 tablet by mouth 2 times daily 4/3/19   Brandi Brady MD   dicyclomine (BENTYL) 10 MG capsule Take 1 capsule by mouth 4 times daily 4/3/19   Brandi Brady MD   sucralfate (CARAFATE) 1 GM tablet Take 1 tablet by mouth 4 times daily 4/3/19   Brandi Brady MD   promethazine (PHENERGAN) 25 MG tablet Take 1 tablet by mouth every 6 hours as needed for Nausea 4/3/19   Brandi Brady MD   desvenlafaxine (KHEDEZLA) 50 MG TB24 extended release tablet Take 50 mg by mouth daily    Historical Provider, MD   citalopram (CELEXA) 40 MG tablet Take 40 mg by mouth daily    Historical Provider, MD   hydrOXYzine (ATARAX) 50 MG tablet Take 50 mg by mouth 3 times daily as needed for Itching    Historical Provider, MD   mirtazapine (REMERON) 30 MG tablet Take 1 tablet by mouth nightly 11/6/18   DON Goins - CNS   traZODone (DESYREL) 50 MG tablet Take 1 tablet by mouth nightly as needed for Sleep 11/6/18   DON Isabel - CNS   prazosin (MINIPRESS) 1 MG capsule Take 1 capsule by mouth 2 times daily 11/6/18   DON Goins - CNS   OLANZapine zydis (ZYPREXA) 5 MG disintegrating tablet Take 1 tablet by mouth nightly 11/6/18   DON Goins - CNS   clonazePAM (KLONOPIN) 0.5 MG tablet Take 1 mg by mouth nightly. 10/3/18 5/11/19  Historical Provider, MD        Allergies:     Haldol [haloperidol] and Zoloft [sertraline hcl]    Social History:     Tobacco:    reports that he has quit smoking. His smoking use included cigarettes. He smoked 0.50 packs per day. He has never used smokeless tobacco.  Alcohol:      reports that he drinks about 1.2 oz of alcohol per week. Drug Use:  reports that he has current or past drug history. Drug: Marijuana. Frequency: 6.00 times per week.     Family History:     Family History   Problem Relation Age of Onset    Kidney Disease Mother     Other Mother         Gastroesophageal reflux disease    Allergies Brother  Cancer Maternal Uncle         Great-uncle    Crohn's Disease Maternal Uncle     Other Father         drug overdose       Review of Systems:     Positive and Negative as described in HPI. CONSTITUTIONAL:  negative for fevers, chills, sweats, fatigue, weight loss  HEENT:  negative for vision, hearing changes, runny nose, throat pain  RESPIRATORY:  Positive for cough with brownish sputum. CARDIOVASCULAR:  negative for chest pain, palpitations. GASTROINTESTINAL:  negative for nausea, vomiting, diarrhea, constipation, change in bowel habits, abdominal pain   GENITOURINARY:  negative for difficulty of urination, burning with urination, frequency   INTEGUMENT:  negative for rash, skin lesions, easy bruising   HEMATOLOGIC/LYMPHATIC:  negative for swelling/edema   ALLERGIC/IMMUNOLOGIC:  negative for urticaria , itching  ENDOCRINE:  negative increase in drinking, increase in urination, hot or cold intolerance  MUSCULOSKELETAL:  negative joint pains, muscle aches, swelling of joints  NEUROLOGICAL:  negative for headaches, dizziness, lightheadedness, numbness, pain, tingling extremities  BEHAVIOR/PSYCH:  Positive for depression    Physical Exam:     /86   Pulse 131   Temp 98.2 °F (36.8 °C) (Oral)   Resp 14   Ht 5' 10\" (1.778 m)   Wt 209 lb (94.8 kg)   BMI 29.99 kg/m²   Temp (24hrs), Av °F (36.7 °C), Min:97.8 °F (36.6 °C), Max:98.2 °F (36.8 °C)    No results for input(s): POCGLU in the last 72 hours. No intake or output data in the 24 hours ending 19 1310    General Appearance:  alert, well appearing, and in no acute distress  Mental status: oriented to person, place, and time with normal affect  Head:  normocephalic, atraumatic.   Eye: no icterus, redness, pupils equal and reactive, extraocular eye movements intact, conjunctiva clear  Ear: normal external ear, no discharge, hearing intact  Nose:  no drainage noted  Mouth: mucous membranes moist  Neck: supple, no carotid bruits, thyroid not palpable  Lungs: Bilateral equal air entry, clear to ausculation, no wheezing, rales or rhonchi, normal effort  Cardiovascular: normal rate, regular rhythm, no murmur, gallop, rub. Abdomen: Soft, nontender, nondistended, normal bowel sounds, no hepatomegaly or splenomegaly  Neurologic: There are no new focal motor or sensory deficits, normal muscle tone and bulk, no abnormal sensation, normal speech, cranial nerves II through XII grossly intact  Skin: No gross lesions, rashes, bruising or bleeding on exposed skin area  Extremities:  peripheral pulses palpable, no pedal edema or calf pain with palpation  Psych: normal affect     Investigations:      Laboratory Testing:  No results found for this or any previous visit (from the past 24 hour(s)). Imaging/Diagonstics:      Assessment :      Primary Problem  Severe major depression [facility])    Active Hospital Problems    Diagnosis Date Noted    Severe major depression ([facility] [address] [F32.2] 05/12/2019       Plan:     1. Elevated white count, likely secondary to excessive vomiting. I suspect patient has cyclic vomiting syndrome. Improving   2.  Patient does not have any local signs of infection, ordering chest x-ray with history of cough    5/14  Patient doing better   Had Reaction to Haldol   Nausea/Vomiting Improved   Xray Chest is negative for Infection   Repeating CBC [place]     Consultations:   Michi Muro MD  5/14/2019  1:10 PM    Copy sent to Dr. Giovanni Milan MD

## 2019-05-14 NOTE — PLAN OF CARE
Problem: Depressive Behavior With or Without Suicide Precautions:  Goal: Able to verbalize and/or display a decrease in depressive symptoms  Description  Able to verbalize and/or display a decrease in depressive symptoms  6/31/4299 [id number] by Joie Molina RN  Outcome: Ongoing  Note:   Patient verbalizes feels safe at this time. States feels much better today than yesterday. Has taken two showers already this morning and is getting anxious feeling. Is social with one peer on the unit and will walk with peer, otherwise isolative to self. Does not attend groups. Ate a little scrambled eggs this morning. Patient is still drinking adequately. 15 minute patient safety checks maintained by staff.

## 2019-05-14 NOTE — GROUP NOTE
Group Therapy Note    Date: May 14    Group Start Time: 1510  Group End Time: 1550  Group Topic: Recreational    [facility] MOHSEN Reed        Group Therapy Note    Attendees: 13/20         Patient's Goal:  Pt will demonstrate increased interpersonal interaction    Notes:      Status After Intervention:  Improved    Participation Level:  Active Listener and Interactive    Participation Quality: Appropriate, Attentive and Sharing      Speech:  normal      Thought Process/Content: Logical  Linear      Affective Functioning: Congruent      Mood: euthymic      Level of consciousness:  Alert, Oriented x4 and Attentive      Response to Learning: Able to verbalize current knowledge/experience, Able to verbalize/acknowledge new learning, Able to retain information and Progressing to goal      Endings: None Reported    Modes of Intervention: Education, Support, Socialization, Exploration, Clarifying, Problem-solving and Activity      Discipline Responsible: Psychoeducational Specialist      Signature:  Annie Reed

## 2019-05-15 LAB
ABSOLUTE EOS #: 0 K/UL (ref 0–0.4)
ABSOLUTE IMMATURE GRANULOCYTE: ABNORMAL K/UL (ref 0–0.3)
ABSOLUTE LYMPH #: 3.3 K/UL (ref 1–4.8)
ABSOLUTE MONO #: 1.3 K/UL (ref 0.1–1.3)
BASOPHILS # BLD: 1 % (ref 0–2)
BASOPHILS ABSOLUTE: 0.1 K/UL (ref 0–0.2)
DIFFERENTIAL TYPE: ABNORMAL
EOSINOPHILS RELATIVE PERCENT: 0 % (ref 0–4)
HCT VFR BLD CALC: 45.7 % (ref 41–53)
HEMOGLOBIN: 15.8 G/DL (ref 13.5–17.5)
IMMATURE GRANULOCYTES: ABNORMAL %
LYMPHOCYTES # BLD: 25 % (ref 24–44)
MCH RBC QN AUTO: 29.9 PG (ref 26–34)
MCHC RBC AUTO-ENTMCNC: 34.7 G/DL (ref 31–37)
MCV RBC AUTO: 86.2 FL (ref 80–100)
MONOCYTES # BLD: 9 % (ref 1–7)
NRBC AUTOMATED: ABNORMAL PER 100 WBC
PDW BLD-RTO: 13.8 % (ref 11.5–14.9)
PLATELET # BLD: 434 K/UL (ref 150–450)
PLATELET ESTIMATE: ABNORMAL
PMV BLD AUTO: 7.1 FL (ref 6–12)
RBC # BLD: 5.31 M/UL (ref 4.5–5.9)
RBC # BLD: ABNORMAL 10*6/UL
SEG NEUTROPHILS: 65 % (ref 36–66)
SEGMENTED NEUTROPHILS ABSOLUTE COUNT: 8.8 K/UL (ref 1.3–9.1)
WBC # BLD: 13.5 K/UL (ref 3.5–11)
WBC # BLD: ABNORMAL 10*3/UL

## 2019-05-15 PROCEDURE — 6370000000 HC RX 637 (ALT 250 FOR IP): Performed by: NURSE PRACTITIONER

## 2019-05-15 PROCEDURE — 1240000000 HC EMOTIONAL WELLNESS R&B

## 2019-05-15 PROCEDURE — 99231 SBSQ HOSP IP/OBS SF/LOW 25: CPT | Performed by: INTERNAL MEDICINE

## 2019-05-15 PROCEDURE — 6370000000 HC RX 637 (ALT 250 FOR IP): Performed by: PSYCHIATRY & NEUROLOGY

## 2019-05-15 PROCEDURE — 99232 SBSQ HOSP IP/OBS MODERATE 35: CPT | Performed by: PSYCHIATRY & NEUROLOGY

## 2019-05-15 PROCEDURE — 85025 COMPLETE CBC W/AUTO DIFF WBC: CPT

## 2019-05-15 PROCEDURE — 6370000000 HC RX 637 (ALT 250 FOR IP): Performed by: INTERNAL MEDICINE

## 2019-05-15 PROCEDURE — 36415 COLL VENOUS BLD VENIPUNCTURE: CPT

## 2019-05-15 RX ORDER — PRAZOSIN HYDROCHLORIDE 1 MG/1
1 CAPSULE ORAL DAILY
Status: DISCONTINUED | OUTPATIENT
Start: 2019-05-15 | End: 2019-05-17 | Stop reason: HOSPADM

## 2019-05-15 RX ORDER — MIRTAZAPINE 30 MG/1
30 TABLET, FILM COATED ORAL NIGHTLY
Status: DISCONTINUED | OUTPATIENT
Start: 2019-05-15 | End: 2019-05-17 | Stop reason: HOSPADM

## 2019-05-15 RX ORDER — CITALOPRAM 40 MG/1
40 TABLET ORAL DAILY
Status: DISCONTINUED | OUTPATIENT
Start: 2019-05-16 | End: 2019-05-17 | Stop reason: HOSPADM

## 2019-05-15 RX ADMIN — SUCRALFATE 1 G: 1 TABLET ORAL at 21:42

## 2019-05-15 RX ADMIN — DICYCLOMINE HYDROCHLORIDE 10 MG: 10 CAPSULE ORAL at 17:26

## 2019-05-15 RX ADMIN — PROMETHAZINE HYDROCHLORIDE 25 MG: 25 TABLET ORAL at 09:54

## 2019-05-15 RX ADMIN — ACETAMINOPHEN 650 MG: 325 TABLET, FILM COATED ORAL at 05:18

## 2019-05-15 RX ADMIN — PANTOPRAZOLE SODIUM 20 MG: 20 TABLET, DELAYED RELEASE ORAL at 17:26

## 2019-05-15 RX ADMIN — HYDROXYZINE HYDROCHLORIDE 50 MG: 50 TABLET, FILM COATED ORAL at 12:31

## 2019-05-15 RX ADMIN — DESVENLAFAXINE SUCCINATE 100 MG: 50 TABLET, FILM COATED, EXTENDED RELEASE ORAL at 09:12

## 2019-05-15 RX ADMIN — BREXPIPRAZOLE 1 MG: 1 TABLET ORAL at 10:50

## 2019-05-15 RX ADMIN — PRAZOSIN HYDROCHLORIDE 2 MG: 1 CAPSULE ORAL at 21:42

## 2019-05-15 RX ADMIN — SUCRALFATE 1 G: 1 TABLET ORAL at 09:12

## 2019-05-15 RX ADMIN — CLONAZEPAM 1 MG: 1 TABLET ORAL at 21:42

## 2019-05-15 RX ADMIN — DICYCLOMINE HYDROCHLORIDE 10 MG: 10 CAPSULE ORAL at 21:42

## 2019-05-15 RX ADMIN — FLUTICASONE PROPIONATE 1 SPRAY: 50 SPRAY, METERED NASAL at 09:15

## 2019-05-15 RX ADMIN — PRAZOSIN HYDROCHLORIDE 1 MG: 1 CAPSULE ORAL at 10:49

## 2019-05-15 RX ADMIN — ONDANSETRON HYDROCHLORIDE 4 MG: 4 TABLET, FILM COATED ORAL at 06:26

## 2019-05-15 RX ADMIN — VITAMIN D, TAB 1000IU (100/BT) 2000 UNITS: 25 TAB at 09:12

## 2019-05-15 RX ADMIN — MIRTAZAPINE 30 MG: 30 TABLET, FILM COATED ORAL at 21:42

## 2019-05-15 RX ADMIN — TRAZODONE HYDROCHLORIDE 100 MG: 100 TABLET ORAL at 21:42

## 2019-05-15 RX ADMIN — SUCRALFATE 1 G: 1 TABLET ORAL at 17:26

## 2019-05-15 RX ADMIN — PANTOPRAZOLE SODIUM 20 MG: 20 TABLET, DELAYED RELEASE ORAL at 06:09

## 2019-05-15 RX ADMIN — ACETAMINOPHEN 650 MG: 325 TABLET, FILM COATED ORAL at 21:45

## 2019-05-15 RX ADMIN — SUCRALFATE 1 G: 1 TABLET ORAL at 12:31

## 2019-05-15 RX ADMIN — DICYCLOMINE HYDROCHLORIDE 10 MG: 10 CAPSULE ORAL at 09:12

## 2019-05-15 RX ADMIN — HYDROXYZINE HYDROCHLORIDE 50 MG: 50 TABLET, FILM COATED ORAL at 05:18

## 2019-05-15 RX ADMIN — DICYCLOMINE HYDROCHLORIDE 10 MG: 10 CAPSULE ORAL at 12:31

## 2019-05-15 RX ADMIN — CLONAZEPAM 0.5 MG: 0.5 TABLET ORAL at 07:31

## 2019-05-15 ASSESSMENT — PAIN SCALES - GENERAL
PAINLEVEL_OUTOF10: 0
PAINLEVEL_OUTOF10: 0
PAINLEVEL_OUTOF10: 3
PAINLEVEL_OUTOF10: 5

## 2019-05-15 NOTE — BH NOTE
Pt extremely anxious this morning, C/O nausea and \"feeling hot\" breathing heavy and fast. And clenching fists. Encouraged to use coping techniques to help deal with symptoms. writer spent time with pt helping him to relax and Klonopin was given early. Pt was able to relax and feel asleep.

## 2019-05-15 NOTE — PLAN OF CARE
Patient + for depression, extremely high anxiety with panic attacks, states unable to cope with father's passing or death of his friend. Reports poor sleep and unable to eat more than a couple small bites. Fleeting suicidal ideation feels safe on unit denies  HI, Reports voices of  loved ones. No  visual hallucinations, medication compliant, flat affect, At times will pace unit to help with anxiety,encouraged to attend groups and socialize with peers, Must give constant encouragement, which pt will start but not finish   remains safe on 15 min checks.

## 2019-05-15 NOTE — PROGRESS NOTES
Department of Psychiatry  Attending Physician Progress Note    Chief Complaint: Severe major depression ([address]     SUBJECTIVE:     The patient is feeling very anxious again today. He has been taking frequent showers to calm himself. He said he vomited several times due to anxiety. He has been having urges to bang his head against the wall. I talked to the patient about letting staff know before he attempts any self-harm. He will be moved to a room closer to the  will have one on one sitter if needed. He is preoccupied with his physical illness and not being able to function. He said he was doing better on the previous medications and requested to be put back on Celexa and Remeron. There was no major side effects. There is no safe alternative other than the hospital treatment at this time. OBJECTIVE    Physical  VITALS:    BP (!) 141/99   Pulse 112   Temp 98.2 °F (36.8 °C) (Oral)   Resp 14   Ht 5' 10\" (1.778 m)   Wt 209 lb (94.8 kg)   BMI 29.99 kg/m²     Mental Status Examination:    Level of consciousness:  Within normal limits  Appearance: Street clothes, seated, with good grooming  Behavior/Motor: psychomotor agitation  Attitude toward examiner:  Cooperative, attentive, good eye contact  Speech:  spontaneous, normal rate, normal volume and well articulated  Mood:  Very anxious  Affect:  Mood-congruent, constricted in range. Thought processes:  linear, goal-directed and coherent  Thought content:  denies homicidal ideation  Suicidal Ideation:  Positive for suicidal ideation  Delusions:  no evidence of delusions  Perceptual Disturbance:  No visual or auditory hallucinations. Cognition:  Intact  Memory: grossly intact.   Insight & Judgement: partial       Medications  Current Facility-Administered Medications: mirtazapine (REMERON) tablet 30 mg, 30 mg, Oral, Nightly  [START ON 5/16/2019] citalopram (CELEXA) tablet 40 mg, 40 mg, Oral, Daily  brexpiprazole (REXULTI) tablet 1 mg, 1 mg, Oral, Daily  prazosin (MINIPRESS) capsule 1 mg, 1 mg, Oral, Daily  traZODone (DESYREL) tablet 100 mg, 100 mg, Oral, Nightly PRN  ondansetron (ZOFRAN) tablet 4 mg, 4 mg, Oral, Q6H PRN  clonazePAM (KLONOPIN) tablet 0.5 mg, 0.5 mg, Oral, Daily  clonazePAM (KLONOPIN) tablet 1 mg, 1 mg, Oral, Nightly  nicotine polacrilex (NICORETTE) gum 2 mg, 2 mg, Oral, PRN  acetaminophen (TYLENOL) tablet 650 mg, 650 mg, Oral, Q4H PRN  magnesium hydroxide (MILK OF MAGNESIA) 400 MG/5ML suspension 30 mL, 30 mL, Oral, Daily PRN  benztropine mesylate (COGENTIN) injection 2 mg, 2 mg, Intramuscular, BID PRN  vitamin D (CHOLECALCIFEROL) tablet 2,000 Units, 2,000 Units, Oral, Daily  pantoprazole (PROTONIX) tablet 20 mg, 20 mg, Oral, BID AC  dicyclomine (BENTYL) capsule 10 mg, 10 mg, Oral, 4x Daily  sucralfate (CARAFATE) tablet 1 g, 1 g, Oral, 4x Daily  promethazine (PHENERGAN) tablet 25 mg, 25 mg, Oral, Q6H PRN  fluticasone (FLONASE) 50 MCG/ACT nasal spray 1 spray, 1 spray, Each Nare, Daily  prazosin (MINIPRESS) capsule 2 mg, 2 mg, Oral, Nightly  OLANZapine (ZYPREXA) tablet 5 mg, 5 mg, Oral, BID PRN  hydrOXYzine (ATARAX) tablet 50 mg, 50 mg, Oral, TID PRN    ASSESSMENT     Principal Problem:    Severe major depression ([facility] [address]  Resolved Problems:    * No resolved hospital problems. *      PLAN    · Patient requested to be put back on all medications. We will DC desvenlafaxine. · Restart Celexa 40 mg daily and Remeron 30 mg daily at bedtime. We discussed the risk of serotonin syndrome. He gave informed consent. · Start a trial of Rexulti since it can help with anxiety and augment treatment of depression. He gave informed consent  · Increase prazosin to 1+2 mg daily to help with anxiety and high blood pressure. · We increased trazodone to 100 mg at bedtime. He is sleeping better. · Continue clonazepam at 0.5+1 mg daily which is a lower dose than what he was taking at home. · Continue unit milieu and group psychotherapy.     Electronically Signed by Luz Elena Hanna MD , 5/15/2019 10:33 AM

## 2019-05-15 NOTE — PLAN OF CARE
Problem: Depressive Behavior With or Without Suicide Precautions:  Goal: Able to verbalize and/or display a decrease in depressive symptoms  Description  Able to verbalize and/or display a decrease in depressive symptoms  Outcome: Ongoing  Note:   Patient denies suicidal ideation at this time. Out in the milieu, social with select peers. Compliant with all prescribed medications during this shift. Depressed, anxious, cooperative. Safety checks maintained q15min and irregular rounding. Problem: Depressive Behavior With or Without Suicide Precautions:  Goal: Ability to disclose and discuss suicidal ideas will improve  Description  Ability to disclose and discuss suicidal ideas will improve  Outcome: Ongoing  Note:   Pt denies thoughts of self harm and is agreeable to seeking out should thoughts of self harm arise. Safe environment maintained. Q15 minute checks for safety cont per unit policy. Will cont to monitor for safety and provides support and reassurance as needed.

## 2019-05-15 NOTE — PROGRESS NOTES
[address] [place] [address] / HISTORY AND PHYSICAL EXAMINATION            Date:   5/15/2019  Patient name:  Narendra Willingham  Date of admission:  5/12/2019  6:03 AM  MRN:   [id number]  Account:  [de-identified]  YOB: 1990  PCP:    Oren Mcclendon MD  Room:   1872/8809-28  Code Status:    Full Code    Physician Requesting Consult: Madelyn Lesch, MD    Reason for Consult:  Elevated WBC     Chief Complaint:     No chief complaint on file. History Obtained From:     patient, electronic medical record    History of Present Illness:   Patient was admitted with major depression, internal medicine is consulted with elevated white count. Patient has marijuana abuse, he has multiple episodes of vomiting's, patient is taking bath ,  multiple times. He quit smoking 3 months back, he was a heavy smoker, noticed cough with brownish sputum, going on for last 3 months. Past Medical History:     Past Medical History:   Diagnosis Date    Acid reflux     Anxiety     Appendicitis     when 11years old    Diverticulitis     History of acute renal failure     due to dehydration    History of stomach ulcers     Irritable bowel syndrome     Ulcer         Past Surgical History:     Past Surgical History:   Procedure Laterality Date    APPENDECTOMY      COLONOSCOPY      UPPER GASTROINTESTINAL ENDOSCOPY      WISDOM TOOTH EXTRACTION          Medications Prior to Admission:     Prior to Admission medications    Medication Sig Start Date End Date Taking?  Authorizing Provider   Cholecalciferol (VITAMIN D3) 2000 units CAPS Take by mouth daily    Historical Provider, MD   propranolol (INDERAL) 10 MG tablet Take 1 tablet by mouth daily 4/5/19   Oren Mcclendon MD   desvenlafaxine succinate (PRISTIQ) 50 MG TB24 extended release tablet Take 50 mg by mouth 3/27/19 4/26/19  Historical Provider, MD   fluticasone (FLONASE) 50 MCG/ACT nasal spray 1 spray by Each Nare route daily no wheezing, rales or rhonchi, normal effort  Cardiovascular: normal rate, regular rhythm, no murmur, gallop, rub. Abdomen: Soft, nontender, nondistended, normal bowel sounds, no hepatomegaly or splenomegaly  Neurologic: There are no new focal motor or sensory deficits, normal muscle tone and bulk, no abnormal sensation, normal speech, cranial nerves II through XII grossly intact  Skin: No gross lesions, rashes, bruising or bleeding on exposed skin area  Extremities:  peripheral pulses palpable, no pedal edema or calf pain with palpation  Psych: normal affect     Investigations:      Laboratory Testing:  Recent Results (from the past 24 hour(s))   CBC with DIFF    Collection Time: 05/15/19  6:51 AM   Result Value Ref Range    WBC 13.5 (H) 3.5 - 11.0 k/uL    RBC 5.31 4.5 - 5.9 m/uL    Hemoglobin 15.8 13.5 - 17.5 g/dL    Hematocrit 45.7 41 - 53 %    MCV 86.2 80 - 100 fL    MCH 29.9 26 - 34 pg    MCHC 34.7 31 - 37 g/dL    RDW 13.8 11.5 - 14.9 %    Platelets 694 411 - 845 k/uL    MPV 7.1 6.0 - 12.0 fL    NRBC Automated NOT REPORTED per 100 WBC    Differential Type NOT REPORTED     Seg Neutrophils 65 36 - 66 %    Lymphocytes 25 24 - 44 %    Monocytes 9 (H) 1 - 7 %    Eosinophils % 0 0 - 4 %    Basophils 1 0 - 2 %    Immature Granulocytes NOT REPORTED 0 %    Segs Absolute 8.80 1.3 - 9.1 k/uL    Absolute Lymph # 3.30 1.0 - 4.8 k/uL    Absolute Mono # 1.30 0.1 - 1.3 k/uL    Absolute Eos # 0.00 0.0 - 0.4 k/uL    Basophils # 0.10 0.0 - 0.2 k/uL    Absolute Immature Granulocyte NOT REPORTED 0.00 - 0.30 k/uL    WBC Morphology NOT REPORTED     RBC Morphology NOT REPORTED     Platelet Estimate NOT REPORTED        Imaging/Diagonstics:      Assessment :      Primary Problem  Severe major depression [facility])    Active Hospital Problems    Diagnosis Date Noted    Severe major depression ([facility] [address] [F32.2] 05/12/2019       Plan:     1. Elevated white count, likely secondary to excessive vomiting.   I suspect patient has cyclic vomiting syndrome. Improving   2.  Patient does not have any local signs of infection, ordering chest x-ray with history of cough    5/14  Patient doing better   Had Reaction to Haldol   Nausea/Vomiting Improved   Xray Chest is negative for Infection   Repeating CBC [place]   5/15  Feeling Better   WBC , Improving   Will Sign offf ,Please call if questions     Consultations:   Juan Vital MD  5/15/2019  4:22 PM    Copy sent to Dr. Joanie Milligan MD

## 2019-05-16 PROCEDURE — 1240000000 HC EMOTIONAL WELLNESS R&B

## 2019-05-16 PROCEDURE — 99232 SBSQ HOSP IP/OBS MODERATE 35: CPT | Performed by: PSYCHIATRY & NEUROLOGY

## 2019-05-16 PROCEDURE — 6370000000 HC RX 637 (ALT 250 FOR IP): Performed by: PSYCHIATRY & NEUROLOGY

## 2019-05-16 PROCEDURE — 6370000000 HC RX 637 (ALT 250 FOR IP): Performed by: NURSE PRACTITIONER

## 2019-05-16 RX ADMIN — VITAMIN D, TAB 1000IU (100/BT) 2000 UNITS: 25 TAB at 08:47

## 2019-05-16 RX ADMIN — DICYCLOMINE HYDROCHLORIDE 10 MG: 10 CAPSULE ORAL at 08:47

## 2019-05-16 RX ADMIN — FLUTICASONE PROPIONATE 1 SPRAY: 50 SPRAY, METERED NASAL at 18:56

## 2019-05-16 RX ADMIN — MIRTAZAPINE 30 MG: 30 TABLET, FILM COATED ORAL at 22:32

## 2019-05-16 RX ADMIN — TRAZODONE HYDROCHLORIDE 100 MG: 100 TABLET ORAL at 22:32

## 2019-05-16 RX ADMIN — DICYCLOMINE HYDROCHLORIDE 10 MG: 10 CAPSULE ORAL at 18:58

## 2019-05-16 RX ADMIN — HYDROXYZINE HYDROCHLORIDE 50 MG: 50 TABLET, FILM COATED ORAL at 07:38

## 2019-05-16 RX ADMIN — SUCRALFATE 1 G: 1 TABLET ORAL at 14:35

## 2019-05-16 RX ADMIN — DICYCLOMINE HYDROCHLORIDE 10 MG: 10 CAPSULE ORAL at 14:35

## 2019-05-16 RX ADMIN — SUCRALFATE 1 G: 1 TABLET ORAL at 22:31

## 2019-05-16 RX ADMIN — BREXPIPRAZOLE 1 MG: 1 TABLET ORAL at 08:47

## 2019-05-16 RX ADMIN — CLONAZEPAM 0.5 MG: 0.5 TABLET ORAL at 08:47

## 2019-05-16 RX ADMIN — SUCRALFATE 1 G: 1 TABLET ORAL at 08:47

## 2019-05-16 RX ADMIN — SUCRALFATE 1 G: 1 TABLET ORAL at 18:58

## 2019-05-16 RX ADMIN — HYDROXYZINE HYDROCHLORIDE 50 MG: 50 TABLET, FILM COATED ORAL at 21:40

## 2019-05-16 RX ADMIN — HYDROXYZINE HYDROCHLORIDE 50 MG: 50 TABLET, FILM COATED ORAL at 15:22

## 2019-05-16 RX ADMIN — CLONAZEPAM 1 MG: 1 TABLET ORAL at 22:32

## 2019-05-16 RX ADMIN — PANTOPRAZOLE SODIUM 20 MG: 20 TABLET, DELAYED RELEASE ORAL at 18:58

## 2019-05-16 RX ADMIN — PRAZOSIN HYDROCHLORIDE 2 MG: 1 CAPSULE ORAL at 22:32

## 2019-05-16 RX ADMIN — PRAZOSIN HYDROCHLORIDE 1 MG: 1 CAPSULE ORAL at 08:47

## 2019-05-16 RX ADMIN — DICYCLOMINE HYDROCHLORIDE 10 MG: 10 CAPSULE ORAL at 22:31

## 2019-05-16 RX ADMIN — PANTOPRAZOLE SODIUM 20 MG: 20 TABLET, DELAYED RELEASE ORAL at 08:48

## 2019-05-16 RX ADMIN — CITALOPRAM HYDROBROMIDE 40 MG: 40 TABLET ORAL at 08:47

## 2019-05-16 ASSESSMENT — PAIN - FUNCTIONAL ASSESSMENT: PAIN_FUNCTIONAL_ASSESSMENT: 0-10

## 2019-05-16 NOTE — GROUP NOTE
Group Therapy Note     Date: May 16     Group Start Time: 0900  Group End Time: 1267  Group Topic: [address] [facility]    [place], [place]           Group Therapy Note     Patient refused to attend Community Meeting/Goals Group at 900am after encouragement from staff.   Pt also refused 1:1 talk time offered as alternative to group session            Discipline Responsible: Psychoeducational Specialist        Signature:  Regina Li

## 2019-05-16 NOTE — PLAN OF CARE
Problem: Suicide risk  Goal: Provide patient with safe environment  Description  Provide patient with safe environment  Outcome: Ongoing  Note:   Pt will be provided with a safe environment     Problem: Depressive Behavior With or Without Suicide Precautions:  Goal: Able to verbalize and/or display a decrease in depressive symptoms  Description  Able to verbalize and/or display a decrease in depressive symptoms  5/15/2019 2231 by Pat Clock  Outcome: Ongoing  Note:   Pt will improve ability to verbalize and/or display a decrease in  depressive symptoms     Problem: Depressive Behavior With or Without Suicide Precautions:  Goal: Ability to disclose and discuss suicidal ideas will improve  Description  Ability to disclose and discuss suicidal ideas will improve  Outcome: Ongoing  Note:   Pt will improve ability to disclose and discuss suicidal ideas openly

## 2019-05-16 NOTE — GROUP NOTE
patient refused to attend Relaxation group at 1600 after encouragement from staff. 1:1 talk time offered but refused.

## 2019-05-17 VITALS
WEIGHT: 209 LBS | SYSTOLIC BLOOD PRESSURE: 125 MMHG | HEIGHT: 70 IN | RESPIRATION RATE: 14 BRPM | HEART RATE: 102 BPM | TEMPERATURE: 98.3 F | DIASTOLIC BLOOD PRESSURE: 85 MMHG | BODY MASS INDEX: 29.92 KG/M2

## 2019-05-17 PROCEDURE — 99239 HOSP IP/OBS DSCHRG MGMT >30: CPT | Performed by: PSYCHIATRY & NEUROLOGY

## 2019-05-17 PROCEDURE — 6370000000 HC RX 637 (ALT 250 FOR IP): Performed by: NURSE PRACTITIONER

## 2019-05-17 PROCEDURE — 5130000000 HC BRIDGE APPOINTMENT

## 2019-05-17 PROCEDURE — 6370000000 HC RX 637 (ALT 250 FOR IP): Performed by: PSYCHIATRY & NEUROLOGY

## 2019-05-17 RX ORDER — PRAZOSIN HYDROCHLORIDE 2 MG/1
2 CAPSULE ORAL NIGHTLY
Qty: 30 CAPSULE | Refills: 0 | Status: SHIPPED | OUTPATIENT
Start: 2019-05-17 | End: 2020-09-15

## 2019-05-17 RX ORDER — PRAZOSIN HYDROCHLORIDE 1 MG/1
1 CAPSULE ORAL DAILY
Qty: 30 CAPSULE | Refills: 0 | Status: SHIPPED | OUTPATIENT
Start: 2019-05-18 | End: 2019-06-03

## 2019-05-17 RX ORDER — TRAZODONE HYDROCHLORIDE 100 MG/1
100 TABLET ORAL NIGHTLY PRN
Qty: 30 TABLET | Refills: 0 | Status: SHIPPED | OUTPATIENT
Start: 2019-05-17 | End: 2019-07-02 | Stop reason: SDUPTHER

## 2019-05-17 RX ADMIN — SUCRALFATE 1 G: 1 TABLET ORAL at 08:34

## 2019-05-17 RX ADMIN — FLUTICASONE PROPIONATE 1 SPRAY: 50 SPRAY, METERED NASAL at 08:34

## 2019-05-17 RX ADMIN — PANTOPRAZOLE SODIUM 20 MG: 20 TABLET, DELAYED RELEASE ORAL at 08:34

## 2019-05-17 RX ADMIN — CITALOPRAM HYDROBROMIDE 40 MG: 40 TABLET ORAL at 08:34

## 2019-05-17 RX ADMIN — VITAMIN D, TAB 1000IU (100/BT) 2000 UNITS: 25 TAB at 08:34

## 2019-05-17 RX ADMIN — DICYCLOMINE HYDROCHLORIDE 10 MG: 10 CAPSULE ORAL at 08:34

## 2019-05-17 RX ADMIN — PRAZOSIN HYDROCHLORIDE 1 MG: 1 CAPSULE ORAL at 08:34

## 2019-05-17 RX ADMIN — HYDROXYZINE HYDROCHLORIDE 50 MG: 50 TABLET, FILM COATED ORAL at 08:34

## 2019-05-17 RX ADMIN — CLONAZEPAM 0.5 MG: 0.5 TABLET ORAL at 08:34

## 2019-05-17 RX ADMIN — BREXPIPRAZOLE 2 MG: 2 TABLET ORAL at 08:34

## 2019-05-17 NOTE — CARE COORDINATION
Name: José Luis Becker    : 1990    Discharge Date: 2019    Primary Auth/Cert #: [id number]    Discharge Medications:      Medication List      START taking these medications    brexpiprazole 2 MG Tabs tablet  Commonly known as:  REXULTI  Take 1 tablet by mouth daily  Start taking on:  2019  Notes to patient: For mood        CHANGE how you take these medications    * prazosin 2 MG capsule  Commonly known as:  MINIPRESS  Take 1 capsule by mouth nightly  What changed:    · medication strength  · how much to take  · when to take this  Notes to patient:  For high blood pressure     * prazosin 1 MG capsule  Commonly known as:  MINIPRESS  Take 1 capsule by mouth daily  Start taking on:  2019  What changed: You were already taking a medication with the same name, and this prescription was added. Make sure you understand how and when to take each. Notes to patient:  For high blood pressure     traZODone 100 MG tablet  Commonly known as:  DESYREL  Take 1 tablet by mouth nightly as needed for Sleep  What changed:    · medication strength  · how much to take  Notes to patient:  For sleep         * This list has 2 medication(s) that are the same as other medications prescribed for you. Read the directions carefully, and ask your doctor or other care provider to review them with you.             CONTINUE taking these medications    citalopram 40 MG tablet  Commonly known as:  CELEXA  Notes to patient:  For depression     clonazePAM 0.5 MG tablet  Commonly known as:  [place]am  Notes to patient:  For anxiety     dicyclomine 10 MG capsule  Commonly known as:  BENTYL  Take 1 capsule by mouth 4 times daily  Notes to patient:  For stomach cramping      fluticasone 50 MCG/ACT nasal spray  Commonly known as:  FLONASE  1 spray by Each Nare route daily  Notes to patient:  For allergies     hydrOXYzine 50 MG tablet  Commonly known as:  ATARAX  Notes to patient:  For anxiety     mirtazapine 30 MG tablet  Commonly known as:  REMERON  Take 1 tablet by mouth nightly  Notes to patient:  For depression     pantoprazole 20 MG tablet  Commonly known as:  PROTONIX  Take 1 tablet by mouth 2 times daily  Notes to patient:  For heartburn     promethazine 25 MG tablet  Commonly known as:  PHENERGAN  Take 1 tablet by mouth every 6 hours as needed for Nausea  Notes to patient:  For nausea     sucralfate 1 GM tablet  Commonly known as:  CARAFATE  Take 1 tablet by mouth 4 times daily  Notes to patient:  For stomach ulcers      Vitamin D3 2000 units Caps  Notes to patient:   For mood         STOP taking these medications    desvenlafaxine 50 MG Tb24 extended release tablet  Commonly known as:  KHEDEZLA     desvenlafaxine succinate 50 MG Tb24 extended release tablet  Commonly known as:  PRISTIQ     OLANZapine zydis 5 MG disintegrating tablet  Commonly known as:  ZYPREXA     propranolol 10 MG tablet  Commonly known as:  INDERAL           Where to Get Your Medications      You can get these medications from any pharmacy    Bring a paper prescription for each of these medications  · brexpiprazole 2 MG Tabs tablet  · prazosin 1 MG capsule  · prazosin 2 MG capsule  · traZODone 100 MG tablet         Follow Up Appointment: Ann Stcay MD  [address] [place] [address]  [phone]          [facility] [address]  Jes [address]ruz [address]  Phone: [phone]  Fax: [phone] on 5/21/2019  Tuesday, May 21 at 3:15pm for hospital follow-up appointment    Discharge to:  [address] [address]  Go on 5/17/2019  Kennethraphael Katradha will have either grandmother and mom pick him up at time of discharge

## 2019-05-17 NOTE — BH NOTE
Patient given tobacco quitline number 94047789262 at this time, refusing to call at this time, states \" I just dont want to quit now\"- patient given information as to the dangers of long term tobacco use. Continue to reinforce the importance of tobacco cessation.
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
## 2019-05-17 NOTE — PLAN OF CARE
Problem: Depressive Behavior With or Without Suicide Precautions:  Goal: Able to verbalize and/or display a decrease in depressive symptoms  Description  Able to verbalize and/or display a decrease in depressive symptoms  5/17/2019 0033 by Moisés Olson RN  Outcome: Ongoing  Note:   Pt denies issues with depression, but does report high anxiety still at this time. He is trying to cope with his coping mechanisms and relaxation techniques. Reports the MD stated he would possibly be going home tomorrow, and patient stated that he was ready. No issues with sleep or appetite reported. He is seen out in the day room today, pacing halls at times and social with select peers. No hallucinations. Reports stomach pain, prn's given. Pt is medication compliant and behaviorally controlled. Safety maintained per unit policy, including q 03Y patient safety checks. Problem: Depressive Behavior With or Without Suicide Precautions:  Goal: Ability to disclose and discuss suicidal ideas will improve  Description  Ability to disclose and discuss suicidal ideas will improve  Outcome: Ongoing  Note:   Pt denies suicidal ideations at this time and agrees to seek assistance from staff should thoughts of self harm arise. Will continue to monitor patient for safety and behavior.

## 2019-05-17 NOTE — GROUP NOTE
Group Therapy Note    Date: May 16    Group Start Time: 2030  Group End Time: 2120  Group Topic: Wrap-Up    POLINA Hartman        Group Therapy Note    Attendees: 12         Patient's Goal:  D/C & FU plans    Notes:  Pt relates FU plans for D/C, returning home to live with parents & begin therapy    Status After Intervention:  Unchanged    Participation Level: Interactive    Participation Quality: Attentive      Speech:  normal      Thought Process/Content: Linear      Affective Functioning: Blunted      Mood: anxious      Level of consciousness:  Alert      Response to Learning: Capable of insight      Endings: None Reported    Modes of Intervention: Problem-solving      Discipline Responsible: [employer]      Signature:  Roland Hartman

## 2019-05-17 NOTE — BH NOTE
[facility]  Discharge Note    Pt discharged with followings belongings:   Dentures: None  Vision - Corrective Lenses: None  Hearing Aid: None  Jewelry: None  Body Piercings Removed: N/A  Clothing: Socks, Footwear, Jacket / coat, Pants, Shirt, Undergarments (Comment)  Were All Patient Medications Collected?: Yes  Other Valuables: None   Valuables sent home with patient. Valuables retrieved from safe, Security envelope number:  [id number] and returned to patient. Patient left department with Departure Mode: Family member via Mobility at Departure: Ambulatory, discharged to Discharged to: Private Residence. Patient education on aftercare instructions. Information faxed to [facility] by RN.  Patient verbalize understanding of AVS.    Status EXAM upon discharge:  Status and Exam  Normal: No  Facial Expression: Flat, Sad  Affect: Congruent, Appropriate  Level of Consciousness: Alert  Mood:Normal: No  Mood: Depressed, Anxious  Motor Activity:Normal: Yes  Motor Activity: Increased  Interview Behavior: Cooperative  Preception: [place] to Person, Diantha Grazyna to Time, [place] to Place, [place] to Situation  Attention:Normal: No  Attention: Distractible  Thought Processes: Circumstantial  Thought Content:Normal: Yes  Thought Content: Preoccupations  Hallucinations: None  Delusions: No  Memory:Normal: Yes  Memory: Poor Recent, Poor Remote  Insight and Judgment: No  Insight and Judgment: Poor Judgment  Present Suicidal Ideation: No  Present Homicidal Ideation: No    Von Reyes RN

## 2019-05-17 NOTE — PROGRESS NOTES
Bridge Appointment completed: Reviewed Discharge Instructions with patient. Patient verbalizes understanding and agreement with the discharge plan using the teachback method.        Discharge Arrangements:  [facility] [address]  Phone: [phone]  5/21/2019  Tuesday, May 21 at 3:15pm for hospital follow-up appointment  Guardian notified: N/A   Discharge destination/address:  [address] [address]  Transported by:  Family member

## 2019-05-17 NOTE — PROGRESS NOTES
Department of Psychiatry  Attending Physician Progress Note    Chief Complaint: Severe major depression ([address]     SUBJECTIVE:     The patient is feeling anxious today but less than yesterday. He denied any suicidal ideations but banged his head against the wall yesterday while anxious and agitated. He said he had a panic attack this morning and felt better after he showered. He also vomited once you to the anxiety. He slept well last night. He is feeling safer about discharge home but he is not quite ready yet. There was no major side effects. There is no safe alternative other than the hospital treatment at this time. OBJECTIVE    Physical  VITALS:    /79   Pulse 105   Temp 98.3 °F (36.8 °C) (Oral)   Resp 14   Ht 5' 10\" (1.778 m)   Wt 209 lb (94.8 kg)   BMI 29.99 kg/m²     Mental Status Examination:    Level of consciousness:  Within normal limits  Appearance: Street clothes, seated, with good grooming  Behavior/Motor: psychomotor agitation  Attitude toward examiner:  Cooperative, attentive, good eye contact  Speech:  spontaneous, normal rate, normal volume and well articulated  Mood:  Very anxious  Affect:  Mood-congruent, constricted in range. Thought processes:  linear, goal-directed and coherent  Thought content:  denies homicidal ideation  Suicidal Ideation:  no suicidal ideation  Delusions:  no evidence of delusions  Perceptual Disturbance:  No visual or auditory hallucinations. Cognition:  Intact  Memory: grossly intact.   Insight & Judgement: partial       Medications  Current Facility-Administered Medications: [START ON 5/17/2019] brexpiprazole (REXULTI) tablet 2 mg, 2 mg, Oral, Daily  mirtazapine (REMERON) tablet 30 mg, 30 mg, Oral, Nightly  citalopram (CELEXA) tablet 40 mg, 40 mg, Oral, Daily  prazosin (MINIPRESS) capsule 1 mg, 1 mg, Oral, Daily  traZODone (DESYREL) tablet 100 mg, 100 mg, Oral, Nightly PRN  ondansetron (ZOFRAN) tablet 4 mg, 4 mg, Oral, Q6H PRN  clonazePAM (KLONOPIN) tablet 0.5 mg, 0.5 mg, Oral, Daily  clonazePAM (KLONOPIN) tablet 1 mg, 1 mg, Oral, Nightly  nicotine polacrilex (NICORETTE) gum 2 mg, 2 mg, Oral, PRN  acetaminophen (TYLENOL) tablet 650 mg, 650 mg, Oral, Q4H PRN  magnesium hydroxide (MILK OF MAGNESIA) 400 MG/5ML suspension 30 mL, 30 mL, Oral, Daily PRN  benztropine mesylate (COGENTIN) injection 2 mg, 2 mg, Intramuscular, BID PRN  vitamin D (CHOLECALCIFEROL) tablet 2,000 Units, 2,000 Units, Oral, Daily  pantoprazole (PROTONIX) tablet 20 mg, 20 mg, Oral, BID AC  dicyclomine (BENTYL) capsule 10 mg, 10 mg, Oral, 4x Daily  sucralfate (CARAFATE) tablet 1 g, 1 g, Oral, 4x Daily  promethazine (PHENERGAN) tablet 25 mg, 25 mg, Oral, Q6H PRN  fluticasone (FLONASE) 50 MCG/ACT nasal spray 1 spray, 1 spray, Each Nare, Daily  prazosin (MINIPRESS) capsule 2 mg, 2 mg, Oral, Nightly  OLANZapine (ZYPREXA) tablet 5 mg, 5 mg, Oral, BID PRN  hydrOXYzine (ATARAX) tablet 50 mg, 50 mg, Oral, TID PRN    ASSESSMENT     Principal Problem:    Severe major depression ([address]  Resolved Problems:    * No resolved hospital problems. *      PLAN    · We restarted Celexa 40 mg daily and Remeron 30 mg daily at bedtime. · Increase Rexulti to 2 mg to help with depression and anxiety. · Increased prazosin to 1+2 mg daily to help with anxiety and high blood pressure. · We increased trazodone to 100 mg at bedtime. He is sleeping better. · Continue clonazepam at 0.5+1 mg daily which is a lower dose than what he was taking at home. · Continue unit milieu and group psychotherapy.     Electronically Signed by León Jeffery MD , 5/16/2019 8:30 PM

## 2019-05-20 ENCOUNTER — TELEPHONE (OUTPATIENT)
Dept: FAMILY MEDICINE CLINIC | Age: 29
End: 2019-05-20

## 2019-05-20 NOTE — TELEPHONE ENCOUNTER
Patient was on list for Transitional care call, [facility] discharge on 5/17/19. Dx severe major depression.

## 2019-05-20 NOTE — TELEPHONE ENCOUNTER
[facility] Trans[facility] Initial Follow Up Call    Outreach made within 2 business days of discharge: Yes    Patient: Wilberto Single Patient : 1990   MRN: [id number]  Reason for Admission: severe major depression  Discharge Date: 19       Spoke with: Waqar Ruiz     Discharge department/facility: [address] [place] [address] Interactive Patient Contact:  Was patient able to fill all prescriptions: Yes  Was patient instructed to bring all medications to the follow-up visit: Yes  Is patient taking all medications as directed in the discharge summary?  Yes  Does patient understand their discharge instructions: Yes  Does patient have questions or concerns that need addressed prior to 7-14 day follow up office visit: no    Scheduled appointment with PCP within 7-14 days  Scheduled with Donovan Kenny MD on 19 at 11:20am    Follow Up  Future Appointments   Date Time Provider Deo Malcolm   [facility]  2:20 [facility]   2019  2:40 PM [facility]       Stacie Pan

## 2019-05-20 NOTE — TELEPHONE ENCOUNTER
Spoke to Brock Ramirez and he stated he felt horrible when he went in to [facility] and he stated he had to get out of there because it was causing him high anxiety. He stated he was vomiting while there and since until today. He stated he is able to keep stuff down today. He is aware of seeing Dr. Seema Mcmahon on 5/30/19 and will be there with mother.

## 2019-05-22 ENCOUNTER — OFFICE VISIT (OUTPATIENT)
Dept: FAMILY MEDICINE CLINIC | Age: 29
End: 2019-05-22
Payer: MEDICARE

## 2019-05-22 VITALS
BODY MASS INDEX: 30.52 KG/M2 | SYSTOLIC BLOOD PRESSURE: 94 MMHG | WEIGHT: 213.2 LBS | DIASTOLIC BLOOD PRESSURE: 60 MMHG | HEART RATE: 82 BPM | HEIGHT: 70 IN | OXYGEN SATURATION: 98 %

## 2019-05-22 DIAGNOSIS — F41.0 GENERALIZED ANXIETY DISORDER WITH PANIC ATTACKS: ICD-10-CM

## 2019-05-22 DIAGNOSIS — F41.1 GENERALIZED ANXIETY DISORDER WITH PANIC ATTACKS: ICD-10-CM

## 2019-05-22 DIAGNOSIS — F33.2 SEVERE EPISODE OF RECURRENT MAJOR DEPRESSIVE DISORDER, WITHOUT PSYCHOTIC FEATURES (HCC): Primary | Chronic | ICD-10-CM

## 2019-05-22 PROCEDURE — 99214 OFFICE O/P EST MOD 30 MIN: CPT | Performed by: FAMILY MEDICINE

## 2019-05-22 PROCEDURE — 1111F DSCHRG MED/CURRENT MED MERGE: CPT | Performed by: FAMILY MEDICINE

## 2019-05-22 ASSESSMENT — ENCOUNTER SYMPTOMS
SHORTNESS OF BREATH: 1
NAUSEA: 0
CONSTIPATION: 0
WHEEZING: 0
COUGH: 0
CHEST TIGHTNESS: 0
ABDOMINAL PAIN: 0
DIARRHEA: 0

## 2019-05-22 NOTE — PROGRESS NOTES
clonazePAM (KLONOPIN) 0.5 MG tablet  Take 1 mg by mouth nightly.               dicyclomine (BENTYL) 10 MG capsule  Take 1 capsule by mouth 4 times daily             fluticasone (FLONASE) 50 MCG/ACT nasal spray  1 spray by Each Nare route daily             hydrOXYzine (ATARAX) 50 MG tablet  Take 50 mg by mouth 3 times daily as needed for Itching             mirtazapine (REMERON) 30 MG tablet  Take 1 tablet by mouth nightly             pantoprazole (PROTONIX) 20 MG tablet  Take 1 tablet by mouth 2 times daily             prazosin (MINIPRESS) 1 MG capsule  Take 1 capsule by mouth daily             prazosin (MINIPRESS) 2 MG capsule  Take 1 capsule by mouth nightly             promethazine (PHENERGAN) 25 MG tablet  Take 1 tablet by mouth every 6 hours as needed for Nausea             sucralfate (CARAFATE) 1 GM tablet  Take 1 tablet by mouth 4 times daily             traZODone (DESYREL) 100 MG tablet  Take 1 tablet by mouth nightly as needed for Sleep                   Medications marked \"taking\" at this time  Outpatient Medications Marked as Taking for the 5/22/19 encounter (Office Visit) with Jose J Farfan MD   Medication Sig Dispense Refill    traZODone (DESYREL) 100 MG tablet Take 1 tablet by mouth nightly as needed for Sleep 30 tablet 0    prazosin (MINIPRESS) 2 MG capsule Take 1 capsule by mouth nightly 30 capsule 0    brexpiprazole (REXULTI) 2 MG TABS tablet Take 1 tablet by mouth daily 30 tablet 0    Cholecalciferol (VITAMIN D3) 2000 units CAPS Take by mouth daily      fluticasone (FLONASE) 50 MCG/ACT nasal spray 1 spray by Each Nare route daily 1 Bottle 3    pantoprazole (PROTONIX) 20 MG tablet Take 1 tablet by mouth 2 times daily 60 tablet 5    dicyclomine (BENTYL) 10 MG capsule Take 1 capsule by mouth 4 times daily 120 capsule 3    sucralfate (CARAFATE) 1 GM tablet Take 1 tablet by mouth 4 times daily 120 tablet 5    citalopram (CELEXA) 40 MG tablet Take 40 mg by mouth daily      hydrOXYzine (ATARAX) 50 MG tablet Take 50 mg by mouth 3 times daily as needed for Itching      mirtazapine (REMERON) 30 MG tablet Take 1 tablet by mouth nightly 30 tablet 0    clonazePAM (KLONOPIN) 0.5 MG tablet Take 1 mg by mouth nightly. Medications patient taking as of now reconciled against medications ordered at time of hospital discharge: Yes    Chief Complaint   Patient presents with    Follow-Up from [address] 5/17/19 dx suicide and anxiety    Other     wants to know if you can help him with getting on disability- because of anxiety       HPI  Here today for a follow up of his anxiety and depression. He was doing very well last time I saw him about 8 weeks ago. Since that time [facility] he has struggled with continued anxiety and when his anxiety gets triggered and he has a panic attack he tends to feel suidical. Mom reports that his most recent episode happened when he was cooking out at a family gathering and he got overwhelmed. He was doing better while at [facility] but he left earlier than he should have because he was getting very overwhelmed with the number of people that were also admitted to the unit. Once he got home mom worked with him to help him calm down and to work through the panic attacks. He feels like he is feeling better and he wants to go back to work, but from what mom has seen he is not doing very well with working right now because he is not able to work for very many hours and he is getting overwhelmed while he is there. Mom thinks it would be a good idea for him to go on disability while he is working on recovering because he is currently being held responsible for child support which is hard for him to pay since he is not working. Inpatient course: Discharge summary reviewed- see chart. Interval history/Current status: stable    Review of Systems   Constitutional: Negative for activity change, appetite change, fatigue and fever.    Respiratory: Positive for shortness of breath (during panic attacks). Negative for cough, chest tightness and wheezing. Cardiovascular: Positive for palpitations (during panic attacks). Negative for chest pain and leg swelling. Gastrointestinal: Negative for abdominal pain, constipation, diarrhea and nausea. Skin: Negative for rash. Neurological: Positive for headaches. Negative for syncope. Psychiatric/Behavioral: Positive for decreased concentration and dysphoric mood. Negative for agitation, self-injury, sleep disturbance and suicidal ideas. The patient is nervous/anxious. Vitals:    05/22/19 1203   BP: 94/60   Site: Left Upper Arm   Position: Sitting   Cuff Size: Medium Adult   Pulse: 82   SpO2: 98%   Weight: 213 lb 3.2 oz (96.7 kg)   Height: 5' 10\" (1.778 m)     Body mass index is 30.59 kg/m². Wt Readings from Last 3 Encounters:   05/22/19 213 lb 3.2 oz (96.7 kg)   04/03/19 205 lb 12.8 oz (93.4 kg)   11/13/18 194 lb 3.2 oz (88.1 kg)     BP Readings from Last 3 Encounters:   05/22/19 94/60   05/12/19 (!) 150/89   04/03/19 110/78       Physical Exam   Constitutional: He is oriented to person, place, and time. He appears well-developed and well-nourished. No distress. Eyes: Conjunctivae are normal.   Neck: Normal range of motion. Neck supple. Cardiovascular: Normal rate, regular rhythm, normal heart sounds and intact distal pulses. No murmur heard. Pulmonary/Chest: Effort normal and breath sounds normal. No respiratory distress. He has no wheezes. He has no rales. Musculoskeletal: Normal range of motion. He exhibits no edema. Lymphadenopathy:     He has no cervical adenopathy. Neurological: He is alert and oriented to person, place, and time. Skin: Skin is warm and dry. No rash noted. Psychiatric: He has a normal mood and affect. His speech is normal. Thought content normal. He is withdrawn. He expresses impulsivity. He is inattentive. Nursing note and vitals reviewed. Assessment/Plan:  1.  Severe

## 2019-05-30 ENCOUNTER — HOSPITAL ENCOUNTER (EMERGENCY)
Age: 29
Discharge: HOME | End: 2019-05-30
Attending: EMERGENCY MEDICINE
Payer: MEDICARE

## 2019-05-30 VITALS
HEART RATE: 92 BPM | RESPIRATION RATE: 28 BRPM | TEMPERATURE: 99 F | HEIGHT: 70 IN | WEIGHT: 198 LBS | BODY MASS INDEX: 28.35 KG/M2 | OXYGEN SATURATION: 95 % | SYSTOLIC BLOOD PRESSURE: 138 MMHG | DIASTOLIC BLOOD PRESSURE: 87 MMHG

## 2019-05-30 DIAGNOSIS — F41.9 ANXIETY HYPERVENTILATION: ICD-10-CM

## 2019-05-30 DIAGNOSIS — F45.8 ANXIETY HYPERVENTILATION: ICD-10-CM

## 2019-05-30 DIAGNOSIS — F41.0 PANIC ATTACK: Primary | ICD-10-CM

## 2019-05-30 PROCEDURE — 96372 THER/PROPH/DIAG INJ SC/IM: CPT

## 2019-05-30 PROCEDURE — 6360000002 HC RX W HCPCS: Performed by: EMERGENCY MEDICINE

## 2019-05-30 PROCEDURE — 99283 EMERGENCY DEPT VISIT LOW MDM: CPT

## 2019-05-30 RX ORDER — OLANZAPINE 15 MG/1
15 TABLET ORAL NIGHTLY
COMMUNITY
End: 2019-07-02

## 2019-05-30 RX ORDER — DESVENLAFAXINE 50 MG/1
50 TABLET, EXTENDED RELEASE ORAL DAILY
COMMUNITY
End: 2019-07-02

## 2019-05-30 RX ORDER — LORAZEPAM 2 MG/ML
1 INJECTION INTRAMUSCULAR ONCE
Status: COMPLETED | OUTPATIENT
Start: 2019-05-30 | End: 2019-05-30

## 2019-05-30 RX ADMIN — LORAZEPAM 1 MG: 2 INJECTION INTRAMUSCULAR; INTRAVENOUS at 11:14

## 2019-05-30 ASSESSMENT — ENCOUNTER SYMPTOMS
DIARRHEA: 0
BLOOD IN STOOL: 0
TROUBLE SWALLOWING: 0
SORE THROAT: 0
ABDOMINAL PAIN: 0
WHEEZING: 0
NAUSEA: 0
VOMITING: 0
BACK PAIN: 0
CONSTIPATION: 0
SHORTNESS OF BREATH: 0

## 2019-05-30 ASSESSMENT — PAIN DESCRIPTION - LOCATION: LOCATION: ABDOMEN

## 2019-05-30 ASSESSMENT — PAIN DESCRIPTION - PAIN TYPE: TYPE: ACUTE PAIN

## 2019-05-30 NOTE — ED PROVIDER NOTES
[facility]  eMERGENCY dEPARTMENT eNCOUnter      Pt Name: Yesica Abbott  MRN: [id number]  [place] 1990  Date of evaluation: 5/30/2019      CHIEF COMPLAINT       Chief Complaint   Patient presents with    Panic Attack         HISTORY OF PRESENT ILLNESS    Yesica Abbott is a 30 y.o. male who presents with chief complaint of anxiety and panic attack, he has a history depression anxiety and panic disorder his medications and recently changed he was on clonazepam several times a day he's noticed aching in the bedtime this morning he knew it was running out so he got very anxious he took several showers which is the way he deals with stress and easily Getting worse I comes in here hyperventilating with carpal pedal spasm  Nursing placed a nonrebreather mask on and they were able, him down and break the spasm. Patient says he just feels anxious he is depressed but not suicidal does not want hurt anybody    REVIEW OF SYSTEMS         Review of Systems   Constitutional: Negative for chills and fever. HENT: Negative for congestion, dental problem, sore throat and trouble swallowing. Eyes: Negative for visual disturbance. Respiratory: Negative for shortness of breath and wheezing. Cardiovascular: Negative for chest pain, palpitations and leg swelling. Gastrointestinal: Negative for abdominal pain, blood in stool, constipation, diarrhea, nausea and vomiting. Genitourinary: Negative for difficulty urinating, dysuria and testicular pain. Musculoskeletal: Negative for back pain, joint swelling and neck pain. Hands and feet cramping   Skin: Negative for rash. Neurological: Negative for dizziness, syncope, weakness and headaches. Hematological: Negative for adenopathy. Does not bruise/bleed easily. Psychiatric/Behavioral: Negative for confusion and suicidal ideas. The patient is nervous/anxious.           PAST MEDICAL HISTORY    has a past medical history of Acid reflux, Anxiety, indicated that both of his brothers are alive. family history includes Allergies in his brother; Cancer in his maternal uncle; Crohn's Disease in his maternal uncle; Kidney Disease in his mother; Other in his father and mother. SOCIAL HISTORY      reports that he quit smoking 11 days ago. His smoking use included cigarettes. He smoked 0.50 packs per day. He has never used smokeless tobacco. He reports that he drinks about 1.2 oz of alcohol per week. He reports that he has current or past drug history. Drug: Marijuana. Frequency: 6.00 times per week. PHYSICAL EXAM     INITIAL VITALS:  height is 5' 10\" (1.778 m) and weight is 198 lb (89.8 kg). His temperature is 99 °F (37.2 °C). His blood pressure is 138/87 and his pulse is 92. His respiration is 28 and oxygen saturation is 95%. Physical Exam   Constitutional: He is oriented to person, place, and time. He appears well-developed and well-nourished. HENT:   Head: Normocephalic and atraumatic. Mouth/Throat: Oropharynx is clear and moist.   Eyes: Pupils are equal, round, and reactive to light. EOM are normal.   Neck: Normal range of motion. Neck supple. Cardiovascular: Normal rate and regular rhythm. Pulmonary/Chest: Effort normal and breath sounds normal.   Abdominal: Soft. Bowel sounds are normal.   Musculoskeletal: Normal range of motion. Neurological: He is alert and oriented to person, place, and time. Skin: Skin is warm and dry. Psychiatric: He has a normal mood and affect.  His behavior is normal.   The patient is anxious         DIFFERENTIAL DIAGNOSIS/ MDM:     Hyperventilation with carpal pedal spasm due to anxiety has since resolved we'll treat his anxiety he is on multiple psychiatric meds I did tell him about comfortable during any of these meds they should follow up with his psychiatrist    DIAGNOSTIC RESULTS     EKG: All EKG's are interpreted by the Emergency Department Physician who either signs or Co-signs this chart in the absence of a cardiologist.        RADIOLOGY:   I directly visualized the following  images and reviewed the radiologist interpretations:          ED BEDSIDE ULTRASOUND:       LABS:  Labs Reviewed - No data to display        EMERGENCY DEPARTMENT COURSE:   Vitals:    Vitals:    05/30/19 1044   BP: 138/87   Pulse: 92   Resp: 28   Temp: 99 °F (37.2 °C)   SpO2: 95%   Weight: 198 lb (89.8 kg)   Height: 5' 10\" (1.778 m)     -------------------------  BP: 138/87, Temp: 99 °F (37.2 °C), Pulse: 92, Resp: 28        Re-evaluation Notes    Agents resting comfortably more relaxed hyperventilation is no longer occurring    CRITICAL CARE:   None        CONSULTS:      PROCEDURES:  None    FINAL IMPRESSION      1. Panic attack    2. Anxiety hyperventilation          DISPOSITION/PLAN   DISPOSITION discharged    Condition on Disposition    Stable    PATIENT REFERRED TO:  Balbir Dooley MD  [address] [place] [address],[address] [facility]  [phone]    Schedule an appointment as soon as possible for a visit in 3 days        DISCHARGE MEDICATIONS:  New Prescriptions    No medications on file       (Please note that portions of this note were completed with a voice recognition program.  Efforts were made to edit the dictations but occasionally words are mis-transcribed.)    Adler MD, F.A.A.E.M.   Attending Emergency Physician                          Jeimy Lynch MD  05/30/19 [id number]

## 2019-06-03 ENCOUNTER — HOSPITAL ENCOUNTER (EMERGENCY)
Age: 29
Discharge: HOME | End: 2019-06-03
Attending: EMERGENCY MEDICINE
Payer: MEDICARE

## 2019-06-03 VITALS
HEART RATE: 90 BPM | DIASTOLIC BLOOD PRESSURE: 96 MMHG | SYSTOLIC BLOOD PRESSURE: 144 MMHG | TEMPERATURE: 100.2 F | RESPIRATION RATE: 20 BRPM

## 2019-06-03 DIAGNOSIS — F41.1 ANXIETY STATE: Primary | ICD-10-CM

## 2019-06-03 DIAGNOSIS — F06.4 ANXIETY DISORDER DUE TO KNOWN PHYSIOLOGICAL CONDITION: ICD-10-CM

## 2019-06-03 LAB
ABSOLUTE EOS #: 0 K/UL (ref 0–0.4)
ABSOLUTE IMMATURE GRANULOCYTE: ABNORMAL K/UL (ref 0–0.3)
ABSOLUTE LYMPH #: 2.1 K/UL (ref 1–4.8)
ABSOLUTE MONO #: 0.7 K/UL (ref 0.1–1.2)
ACETAMINOPHEN LEVEL: <5 UG/ML (ref 10–30)
ALBUMIN SERPL-MCNC: 4.7 G/DL (ref 3.5–5.2)
ALBUMIN/GLOBULIN RATIO: 1.4 (ref 1–2.5)
ALP BLD-CCNC: 90 U/L (ref 40–129)
ALT SERPL-CCNC: 31 U/L (ref 5–41)
AMPHETAMINE SCREEN URINE: NEGATIVE
ANION GAP SERPL CALCULATED.3IONS-SCNC: 15 MMOL/L (ref 9–17)
AST SERPL-CCNC: 40 U/L
BARBITURATE SCREEN URINE: NEGATIVE
BASOPHILS # BLD: 0 % (ref 0–2)
BASOPHILS ABSOLUTE: 0 K/UL (ref 0–0.2)
BENZODIAZEPINE SCREEN, URINE: NEGATIVE
BILIRUB SERPL-MCNC: 0.48 MG/DL (ref 0.3–1.2)
BUN BLDV-MCNC: 13 MG/DL (ref 6–20)
BUN/CREAT BLD: 16 (ref 9–20)
BUPRENORPHINE URINE: NEGATIVE
CALCIUM SERPL-MCNC: 10.2 MG/DL (ref 8.6–10.4)
CANNABINOID SCREEN URINE: POSITIVE
CHLORIDE BLD-SCNC: 108 MMOL/L (ref 98–107)
CO2: 20 MMOL/L (ref 20–31)
COCAINE METABOLITE, URINE: NEGATIVE
CREAT SERPL-MCNC: 0.83 MG/DL (ref 0.7–1.2)
DIFFERENTIAL TYPE: ABNORMAL
EOSINOPHILS RELATIVE PERCENT: 0 % (ref 1–8)
ETHANOL PERCENT: NORMAL %
ETHANOL: <10 MG/DL
GFR AFRICAN AMERICAN: >60 ML/MIN
GFR NON-AFRICAN AMERICAN: >60 ML/MIN
GFR SERPL CREATININE-BSD FRML MDRD: ABNORMAL ML/MIN/{1.73_M2}
GFR SERPL CREATININE-BSD FRML MDRD: ABNORMAL ML/MIN/{1.73_M2}
GLUCOSE BLD-MCNC: 110 MG/DL (ref 70–99)
HCT VFR BLD CALC: 43.1 % (ref 41–53)
HEMOGLOBIN: 14.5 G/DL (ref 13.5–17.5)
IMMATURE GRANULOCYTES: ABNORMAL %
LYMPHOCYTES # BLD: 14 % (ref 15–43)
MAGNESIUM: 2.1 MG/DL (ref 1.6–2.6)
MCH RBC QN AUTO: 30 PG (ref 26–34)
MCHC RBC AUTO-ENTMCNC: 33.6 G/DL (ref 31–37)
MCV RBC AUTO: 89.5 FL (ref 80–100)
MDMA URINE: ABNORMAL
METHADONE SCREEN, URINE: NEGATIVE
METHAMPHETAMINE, URINE: NEGATIVE
MONOCYTES # BLD: 5 % (ref 6–14)
NRBC AUTOMATED: ABNORMAL PER 100 WBC
OPIATES, URINE: NEGATIVE
OXYCODONE SCREEN URINE: NEGATIVE
PDW BLD-RTO: 14 % (ref 11–14.5)
PHENCYCLIDINE, URINE: NEGATIVE
PLATELET # BLD: 459 K/UL (ref 140–450)
PLATELET ESTIMATE: ABNORMAL
PMV BLD AUTO: 7.2 FL (ref 6–12)
POTASSIUM SERPL-SCNC: 3.8 MMOL/L (ref 3.7–5.3)
PROPOXYPHENE, URINE: NEGATIVE
RBC # BLD: 4.82 M/UL (ref 4.5–5.9)
RBC # BLD: ABNORMAL 10*6/UL
SALICYLATE LEVEL: <1 MG/DL (ref 3–10)
SEG NEUTROPHILS: 81 % (ref 44–74)
SEGMENTED NEUTROPHILS ABSOLUTE COUNT: 12.3 K/UL (ref 1.8–7.7)
SODIUM BLD-SCNC: 143 MMOL/L (ref 135–144)
TEST INFORMATION: ABNORMAL
TOTAL PROTEIN: 8 G/DL (ref 6.4–8.3)
TRICYCLIC ANTIDEPRESSANTS, UR: NEGATIVE
WBC # BLD: 15.2 K/UL (ref 3.5–11)
WBC # BLD: ABNORMAL 10*3/UL

## 2019-06-03 PROCEDURE — 80053 COMPREHEN METABOLIC PANEL: CPT

## 2019-06-03 PROCEDURE — 83735 ASSAY OF MAGNESIUM: CPT

## 2019-06-03 PROCEDURE — 80306 DRUG TEST PRSMV INSTRMNT: CPT

## 2019-06-03 PROCEDURE — 6360000002 HC RX W HCPCS: Performed by: EMERGENCY MEDICINE

## 2019-06-03 PROCEDURE — 6360000002 HC RX W HCPCS

## 2019-06-03 PROCEDURE — 85025 COMPLETE CBC W/AUTO DIFF WBC: CPT

## 2019-06-03 PROCEDURE — 80307 DRUG TEST PRSMV CHEM ANLYZR: CPT

## 2019-06-03 PROCEDURE — 99283 EMERGENCY DEPT VISIT LOW MDM: CPT

## 2019-06-03 PROCEDURE — 96372 THER/PROPH/DIAG INJ SC/IM: CPT

## 2019-06-03 PROCEDURE — G0480 DRUG TEST DEF 1-7 CLASSES: HCPCS

## 2019-06-03 PROCEDURE — 36415 COLL VENOUS BLD VENIPUNCTURE: CPT

## 2019-06-03 PROCEDURE — 6370000000 HC RX 637 (ALT 250 FOR IP): Performed by: EMERGENCY MEDICINE

## 2019-06-03 RX ORDER — LORAZEPAM 0.5 MG/1
1 TABLET ORAL ONCE
Status: DISCONTINUED | OUTPATIENT
Start: 2019-06-03 | End: 2019-06-04 | Stop reason: HOSPADM

## 2019-06-03 RX ORDER — PANTOPRAZOLE SODIUM 20 MG/1
TABLET, DELAYED RELEASE ORAL
Qty: 60 TABLET | Refills: 5 | OUTPATIENT
Start: 2019-06-03

## 2019-06-03 RX ORDER — LORAZEPAM 2 MG/ML
1 INJECTION INTRAMUSCULAR ONCE
Status: COMPLETED | OUTPATIENT
Start: 2019-06-03 | End: 2019-06-03

## 2019-06-03 RX ORDER — LORAZEPAM 2 MG/ML
INJECTION INTRAMUSCULAR
Status: COMPLETED
Start: 2019-06-03 | End: 2019-06-03

## 2019-06-03 RX ORDER — CLONAZEPAM 0.5 MG/1
0.5 TABLET ORAL 2 TIMES DAILY
Status: DISCONTINUED | OUTPATIENT
Start: 2019-06-03 | End: 2019-06-04 | Stop reason: HOSPADM

## 2019-06-03 RX ADMIN — LORAZEPAM 1 MG: 2 INJECTION INTRAMUSCULAR at 20:37

## 2019-06-03 RX ADMIN — LORAZEPAM 1 MG: 2 INJECTION INTRAMUSCULAR; INTRAVENOUS at 20:37

## 2019-06-03 RX ADMIN — CLONAZEPAM 0.5 MG: 0.5 TABLET ORAL at 21:58

## 2019-06-03 RX ADMIN — LORAZEPAM 1 MG: 2 INJECTION INTRAMUSCULAR; INTRAVENOUS at 16:39

## 2019-06-03 ASSESSMENT — ENCOUNTER SYMPTOMS
VOMITING: 0
WHEEZING: 0
SORE THROAT: 0
SHORTNESS OF BREATH: 0
ABDOMINAL PAIN: 0
DIARRHEA: 0
NAUSEA: 0

## 2019-06-03 ASSESSMENT — PAIN DESCRIPTION - LOCATION: LOCATION: ABDOMEN;CHEST

## 2019-06-03 NOTE — ED NOTES
[facility] screener in to talk with pt. Prior to walking in pt was pacing in the hallway.      Yessy Perez RN  06/03/19 4103
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
## 2019-06-04 NOTE — ED PROVIDER NOTES
Non-African American >60 >60 mL/min    GFR African American >60 >60 mL/min    GFR Comment          GFR Staging NOT REPORTED    Magnesium   Result Value Ref Range    Magnesium 2.1 1.6 - 2.6 mg/dL   Acetaminophen Level   Result Value Ref Range    Acetaminophen Level <5 (L) 10 - 30 ug/mL   Ethanol   Result Value Ref Range    Ethanol <10 <10 mg/dL    Ethanol percent NOT REPORTED %   Urine Drug Screen   Result Value Ref Range    Amphetamine Screen, Ur NEGATIVE NEGATIVE    Barbiturate Screen, Ur NEGATIVE NEGATIVE    Benzodiazepine Screen, Urine NEGATIVE NEGATIVE    Cocaine Metabolite, Urine NEGATIVE NEGATIVE    Methadone Screen, Urine NEGATIVE NEGATIVE    Opiates, Urine NEGATIVE NEGATIVE    Phencyclidine, Urine NEGATIVE NEGATIVE    Propoxyphene, Urine NEGATIVE NEGATIVE    Cannabinoid Scrn, Ur POSITIVE (A) NEGATIVE    Oxycodone Screen, Ur NEGATIVE NEGATIVE    Methamphetamine, Urine NEGATIVE NEGATIVE    Tricyclic Antidepressants, Urine NEGATIVE NEGATIVE    MDMA, Urine NOT REPORTED NEGATIVE    Buprenorphine Urine NEGATIVE NEGATIVE    Test Information       The following threshold concentrations are established for the drug assays:   Salicylate   Result Value Ref Range    Salicylate Lvl <1 (L) 3 - 10 mg/dL       RADIOLOGY:  No orders to display       RECENT VITALS:  BP: (!) 144/96, Temp: 100.2 °F (37.9 °C), Pulse: 90, Resp: 20     ED Course     The patient was given the following medications:  Orders Placed This Encounter   Medications    LORazepam (ATIVAN) injection 1 mg    LORazepam (ATIVAN) tablet 1 mg    LORazepam (ATIVAN) 2 MG/ML injection     APOLINAR HAN: cabinet override    LORazepam (ATIVAN) injection 1 mg    clonazePAM (KLONOPIN) tablet 0.5 mg       Medical Decision Making           First call for help evaluated feels that he would probably qualify for inpatient treatment. However, the patient is refusing since is not suicidal, homicidal, they will set him up as outpatient.   Return if worse    Disposition

## 2019-06-06 RX ORDER — PANTOPRAZOLE SODIUM 20 MG/1
TABLET, DELAYED RELEASE ORAL
Qty: 60 TABLET | Refills: 5 | Status: SHIPPED | OUTPATIENT
Start: 2019-06-06 | End: 2021-03-15 | Stop reason: SDUPTHER

## 2019-06-30 ENCOUNTER — APPOINTMENT (OUTPATIENT)
Dept: CT IMAGING | Age: 29
End: 2019-06-30
Payer: MEDICARE

## 2019-06-30 ENCOUNTER — HOSPITAL ENCOUNTER (EMERGENCY)
Age: 29
Discharge: HOME | End: 2019-06-30
Attending: EMERGENCY MEDICINE
Payer: MEDICARE

## 2019-06-30 VITALS
DIASTOLIC BLOOD PRESSURE: 74 MMHG | RESPIRATION RATE: 18 BRPM | SYSTOLIC BLOOD PRESSURE: 123 MMHG | HEART RATE: 83 BPM | OXYGEN SATURATION: 98 % | TEMPERATURE: 100.4 F

## 2019-06-30 DIAGNOSIS — R11.2 NAUSEA AND VOMITING, INTRACTABILITY OF VOMITING NOT SPECIFIED, UNSPECIFIED VOMITING TYPE: ICD-10-CM

## 2019-06-30 DIAGNOSIS — F41.1 ANXIETY STATE: ICD-10-CM

## 2019-06-30 DIAGNOSIS — R10.9 ABDOMINAL PAIN, UNSPECIFIED ABDOMINAL LOCATION: Primary | ICD-10-CM

## 2019-06-30 LAB
ABSOLUTE EOS #: 0 K/UL (ref 0–0.4)
ABSOLUTE IMMATURE GRANULOCYTE: ABNORMAL K/UL (ref 0–0.3)
ABSOLUTE LYMPH #: 2 K/UL (ref 1–4.8)
ABSOLUTE MONO #: 0.6 K/UL (ref 0.1–1.2)
ALBUMIN SERPL-MCNC: 5.2 G/DL (ref 3.5–5.2)
ALBUMIN/GLOBULIN RATIO: 1.6 (ref 1–2.5)
ALP BLD-CCNC: <5 U/L (ref 40–129)
ALT SERPL-CCNC: 28 U/L (ref 5–41)
AMYLASE: 70 U/L (ref 28–100)
ANION GAP SERPL CALCULATED.3IONS-SCNC: 25 MMOL/L (ref 9–17)
AST SERPL-CCNC: 14 U/L
BASOPHILS # BLD: 0 % (ref 0–2)
BASOPHILS ABSOLUTE: 0 K/UL (ref 0–0.2)
BILIRUB SERPL-MCNC: 0.71 MG/DL (ref 0.3–1.2)
BILIRUBIN DIRECT: 0.15 MG/DL
BILIRUBIN, INDIRECT: 0.56 MG/DL (ref 0–1)
BUN BLDV-MCNC: 18 MG/DL (ref 6–20)
BUN/CREAT BLD: 13 (ref 9–20)
CALCIUM SERPL-MCNC: 10.4 MG/DL (ref 8.6–10.4)
CHLORIDE BLD-SCNC: 99 MMOL/L (ref 98–107)
CO2: 14 MMOL/L (ref 20–31)
CREAT SERPL-MCNC: 1.34 MG/DL (ref 0.7–1.2)
DIFFERENTIAL TYPE: ABNORMAL
EOSINOPHILS RELATIVE PERCENT: 0 % (ref 1–8)
GFR AFRICAN AMERICAN: >60 ML/MIN
GFR NON-AFRICAN AMERICAN: >60 ML/MIN
GFR SERPL CREATININE-BSD FRML MDRD: ABNORMAL ML/MIN/{1.73_M2}
GFR SERPL CREATININE-BSD FRML MDRD: ABNORMAL ML/MIN/{1.73_M2}
GLOBULIN: 3.3 G/DL (ref 1.5–3.8)
GLUCOSE BLD-MCNC: 151 MG/DL (ref 70–99)
HCT VFR BLD CALC: 45.7 % (ref 41–53)
HEMOGLOBIN: 15.7 G/DL (ref 13.5–17.5)
IMMATURE GRANULOCYTES: ABNORMAL %
LIPASE: 13 U/L (ref 13–60)
LYMPHOCYTES # BLD: 14 % (ref 15–43)
MCH RBC QN AUTO: 29.9 PG (ref 26–34)
MCHC RBC AUTO-ENTMCNC: 34.3 G/DL (ref 31–37)
MCV RBC AUTO: 87.2 FL (ref 80–100)
MONOCYTES # BLD: 4 % (ref 6–14)
NRBC AUTOMATED: ABNORMAL PER 100 WBC
PDW BLD-RTO: 13.9 % (ref 11–14.5)
PLATELET # BLD: 418 K/UL (ref 140–450)
PLATELET ESTIMATE: ABNORMAL
PMV BLD AUTO: 7.2 FL (ref 6–12)
POTASSIUM SERPL-SCNC: 3.5 MMOL/L (ref 3.7–5.3)
RBC # BLD: 5.24 M/UL (ref 4.5–5.9)
RBC # BLD: ABNORMAL 10*6/UL
SEG NEUTROPHILS: 82 % (ref 44–74)
SEGMENTED NEUTROPHILS ABSOLUTE COUNT: 12 K/UL (ref 1.8–7.7)
SODIUM BLD-SCNC: 138 MMOL/L (ref 135–144)
TOTAL PROTEIN: 8.5 G/DL (ref 6.4–8.3)
WBC # BLD: 14.5 K/UL (ref 3.5–11)
WBC # BLD: ABNORMAL 10*3/UL

## 2019-06-30 PROCEDURE — 82150 ASSAY OF AMYLASE: CPT

## 2019-06-30 PROCEDURE — 6360000002 HC RX W HCPCS: Performed by: EMERGENCY MEDICINE

## 2019-06-30 PROCEDURE — 83690 ASSAY OF LIPASE: CPT

## 2019-06-30 PROCEDURE — 99284 EMERGENCY DEPT VISIT MOD MDM: CPT

## 2019-06-30 PROCEDURE — 2580000003 HC RX 258: Performed by: EMERGENCY MEDICINE

## 2019-06-30 PROCEDURE — 74176 CT ABD & PELVIS W/O CONTRAST: CPT

## 2019-06-30 PROCEDURE — 96374 THER/PROPH/DIAG INJ IV PUSH: CPT

## 2019-06-30 PROCEDURE — 80048 BASIC METABOLIC PNL TOTAL CA: CPT

## 2019-06-30 PROCEDURE — 85025 COMPLETE CBC W/AUTO DIFF WBC: CPT

## 2019-06-30 PROCEDURE — 80076 HEPATIC FUNCTION PANEL: CPT

## 2019-06-30 PROCEDURE — 96375 TX/PRO/DX INJ NEW DRUG ADDON: CPT

## 2019-06-30 RX ORDER — ONDANSETRON 2 MG/ML
4 INJECTION INTRAMUSCULAR; INTRAVENOUS ONCE
Status: COMPLETED | OUTPATIENT
Start: 2019-06-30 | End: 2019-06-30

## 2019-06-30 RX ORDER — ONDANSETRON 4 MG/1
4 TABLET, FILM COATED ORAL EVERY 8 HOURS PRN
Qty: 20 TABLET | Refills: 0 | Status: SHIPPED | OUTPATIENT
Start: 2019-06-30 | End: 2019-08-19

## 2019-06-30 RX ORDER — LORAZEPAM 2 MG/ML
0.5 INJECTION INTRAMUSCULAR ONCE
Status: COMPLETED | OUTPATIENT
Start: 2019-06-30 | End: 2019-06-30

## 2019-06-30 RX ORDER — 0.9 % SODIUM CHLORIDE 0.9 %
1000 INTRAVENOUS SOLUTION INTRAVENOUS ONCE
Status: COMPLETED | OUTPATIENT
Start: 2019-06-30 | End: 2019-06-30

## 2019-06-30 RX ORDER — KETOROLAC TROMETHAMINE 30 MG/ML
15 INJECTION, SOLUTION INTRAMUSCULAR; INTRAVENOUS ONCE
Status: COMPLETED | OUTPATIENT
Start: 2019-06-30 | End: 2019-06-30

## 2019-06-30 RX ADMIN — SODIUM CHLORIDE 1000 ML: 9 INJECTION, SOLUTION INTRAVENOUS at 11:59

## 2019-06-30 RX ADMIN — KETOROLAC TROMETHAMINE 15 MG: 30 INJECTION, SOLUTION INTRAMUSCULAR at 12:00

## 2019-06-30 RX ADMIN — ONDANSETRON 4 MG: 2 INJECTION INTRAMUSCULAR; INTRAVENOUS at 12:00

## 2019-06-30 RX ADMIN — LORAZEPAM 0.5 MG: 2 INJECTION INTRAMUSCULAR; INTRAVENOUS at 12:42

## 2019-06-30 ASSESSMENT — PAIN SCALES - GENERAL
PAINLEVEL_OUTOF10: 7
PAINLEVEL_OUTOF10: 7
PAINLEVEL_OUTOF10: 3

## 2019-06-30 ASSESSMENT — PAIN DESCRIPTION - LOCATION: LOCATION: ABDOMEN

## 2019-06-30 ASSESSMENT — PAIN DESCRIPTION - ORIENTATION: ORIENTATION: LOWER

## 2019-06-30 ASSESSMENT — PAIN DESCRIPTION - DESCRIPTORS: DESCRIPTORS: CRAMPING

## 2019-06-30 ASSESSMENT — PAIN DESCRIPTION - PAIN TYPE: TYPE: ACUTE PAIN

## 2019-06-30 NOTE — ED PROVIDER NOTES
mouth daily     CITALOPRAM (CELEXA) 40 MG TABLET    Take 40 mg by mouth daily    CLONAZEPAM (KLONOPIN) 0.5 MG TABLET    Take 1 mg by mouth nightly. DESVENLAFAXINE SUCCINATE (PRISTIQ) 50 MG TB24 EXTENDED RELEASE TABLET    Take 50 mg by mouth daily    DICYCLOMINE (BENTYL) 10 MG CAPSULE    Take 1 capsule by mouth 4 times daily    ERGOCALCIFEROL (VITAMIN D2 PO)    Take 1 capsule by mouth once a week Indications: 50,00 IU    FLUTICASONE (FLONASE) 50 MCG/ACT NASAL SPRAY    1 spray by Each Nare route daily    HYDROXYZINE (ATARAX) 50 MG TABLET    Take 50 mg by mouth 3 times daily as needed for Itching    MIRTAZAPINE (REMERON) 30 MG TABLET    Take 1 tablet by mouth nightly    OLANZAPINE (ZYPREXA) 15 MG TABLET    Take 15 mg by mouth nightly    PANTOPRAZOLE (PROTONIX) 20 MG TABLET    TAKE 1 TABLET BY MOUTH TWICE DAILY    PRAZOSIN (MINIPRESS) 2 MG CAPSULE    Take 1 capsule by mouth nightly    PROMETHAZINE (PHENERGAN) 25 MG TABLET    Take 1 tablet by mouth every 6 hours as needed for Nausea    SUCRALFATE (CARAFATE) 1 GM TABLET    Take 1 tablet by mouth 4 times daily    TRAZODONE (DESYREL) 100 MG TABLET    Take 1 tablet by mouth nightly as needed for Sleep       ALLERGIES     is allergic to haldol [haloperidol] and zoloft [sertraline hcl]. FAMILY HISTORY     indicated that his mother is alive. He indicated that his father is . He indicated that his sister is alive. He indicated that both of his brothers are alive. family history includes Allergies in his brother; Cancer in his maternal uncle; Crohn's Disease in his maternal uncle; Kidney Disease in his mother; Other in his father and mother. SOCIAL HISTORY      reports that he quit smoking about 6 weeks ago. His smoking use included cigarettes. He smoked 0.50 packs per day. He has never used smokeless tobacco. He reports that he drinks about 1.2 oz of alcohol per week. He reports that he has current or past drug history. Drug: Marijuana.  Frequency: 6.00 occasionally words are mis-transcribed.)    Shun Baez,, DO  Attending Emergency Physician        Shun Baez,   06/30/19 4274

## 2019-07-01 ENCOUNTER — HOSPITAL ENCOUNTER (EMERGENCY)
Age: 29
Discharge: HOME | End: 2019-07-01
Attending: EMERGENCY MEDICINE
Payer: MEDICARE

## 2019-07-01 VITALS
HEIGHT: 70 IN | WEIGHT: 190 LBS | OXYGEN SATURATION: 99 % | BODY MASS INDEX: 27.2 KG/M2 | HEART RATE: 67 BPM | RESPIRATION RATE: 18 BRPM | DIASTOLIC BLOOD PRESSURE: 73 MMHG | SYSTOLIC BLOOD PRESSURE: 128 MMHG | TEMPERATURE: 98.6 F

## 2019-07-01 DIAGNOSIS — F41.9 ANXIETY: Primary | ICD-10-CM

## 2019-07-01 DIAGNOSIS — R45.851 SUICIDAL IDEATION: ICD-10-CM

## 2019-07-01 LAB
-: NORMAL
ABSOLUTE EOS #: 0 K/UL (ref 0–0.4)
ABSOLUTE IMMATURE GRANULOCYTE: ABNORMAL K/UL (ref 0–0.3)
ABSOLUTE LYMPH #: 2.1 K/UL (ref 1–4.8)
ABSOLUTE MONO #: 0.8 K/UL (ref 0.1–1.2)
ACETAMINOPHEN LEVEL: <5 UG/ML (ref 10–30)
ALBUMIN SERPL-MCNC: 4.8 G/DL (ref 3.5–5.2)
ALBUMIN/GLOBULIN RATIO: 1.7 (ref 1–2.5)
ALP BLD-CCNC: 85 U/L (ref 40–129)
ALT SERPL-CCNC: 29 U/L (ref 5–41)
AMORPHOUS: NORMAL
AMPHETAMINE SCREEN URINE: NEGATIVE
ANION GAP SERPL CALCULATED.3IONS-SCNC: 14 MMOL/L (ref 9–17)
AST SERPL-CCNC: 16 U/L
BACTERIA: NORMAL
BARBITURATE SCREEN URINE: NEGATIVE
BASOPHILS # BLD: 1 % (ref 0–2)
BASOPHILS ABSOLUTE: 0.1 K/UL (ref 0–0.2)
BENZODIAZEPINE SCREEN, URINE: POSITIVE
BILIRUB SERPL-MCNC: 0.81 MG/DL (ref 0.3–1.2)
BILIRUBIN URINE: NEGATIVE
BUN BLDV-MCNC: 16 MG/DL (ref 6–20)
BUN/CREAT BLD: 15 (ref 9–20)
BUPRENORPHINE URINE: NEGATIVE
CALCIUM SERPL-MCNC: 9.6 MG/DL (ref 8.6–10.4)
CANNABINOID SCREEN URINE: POSITIVE
CASTS UA: NORMAL /LPF (ref 0–2)
CHLORIDE BLD-SCNC: 105 MMOL/L (ref 98–107)
CO2: 21 MMOL/L (ref 20–31)
COCAINE METABOLITE, URINE: NEGATIVE
COLOR: ABNORMAL
COMMENT UA: ABNORMAL
CREAT SERPL-MCNC: 1.07 MG/DL (ref 0.7–1.2)
CRYSTALS, UA: NORMAL /HPF
DIFFERENTIAL TYPE: ABNORMAL
EKG ATRIAL RATE: 67 BPM
EKG P AXIS: 41 DEGREES
EKG P-R INTERVAL: 142 MS
EKG Q-T INTERVAL: 418 MS
EKG QRS DURATION: 92 MS
EKG QTC CALCULATION (BAZETT): 441 MS
EKG R AXIS: 39 DEGREES
EKG T AXIS: 29 DEGREES
EKG VENTRICULAR RATE: 67 BPM
EOSINOPHILS RELATIVE PERCENT: 0 % (ref 1–8)
EPITHELIAL CELLS UA: NORMAL /HPF (ref 0–5)
ETHANOL PERCENT: NORMAL %
ETHANOL: <10 MG/DL
GFR AFRICAN AMERICAN: >60 ML/MIN
GFR NON-AFRICAN AMERICAN: >60 ML/MIN
GFR SERPL CREATININE-BSD FRML MDRD: ABNORMAL ML/MIN/{1.73_M2}
GFR SERPL CREATININE-BSD FRML MDRD: ABNORMAL ML/MIN/{1.73_M2}
GLUCOSE BLD-MCNC: 123 MG/DL (ref 70–99)
GLUCOSE URINE: NEGATIVE
HCT VFR BLD CALC: 43.8 % (ref 41–53)
HEMOGLOBIN: 14.9 G/DL (ref 13.5–17.5)
IMMATURE GRANULOCYTES: ABNORMAL %
KETONES, URINE: ABNORMAL
LACTIC ACID: 1 MMOL/L (ref 0.5–2.2)
LEUKOCYTE ESTERASE, URINE: NEGATIVE
LIPASE: 13 U/L (ref 13–60)
LYMPHOCYTES # BLD: 21 % (ref 15–43)
MAGNESIUM: 2.2 MG/DL (ref 1.6–2.6)
MCH RBC QN AUTO: 29.8 PG (ref 26–34)
MCHC RBC AUTO-ENTMCNC: 34.1 G/DL (ref 31–37)
MCV RBC AUTO: 87.5 FL (ref 80–100)
MDMA URINE: ABNORMAL
METHADONE SCREEN, URINE: NEGATIVE
METHAMPHETAMINE, URINE: NEGATIVE
MONOCYTES # BLD: 8 % (ref 6–14)
MUCUS: NORMAL
NITRITE, URINE: NEGATIVE
NRBC AUTOMATED: ABNORMAL PER 100 WBC
OPIATES, URINE: NEGATIVE
OTHER OBSERVATIONS UA: NORMAL
OXYCODONE SCREEN URINE: NEGATIVE
PDW BLD-RTO: 13.7 % (ref 11–14.5)
PH UA: 5.5 (ref 5–6)
PHENCYCLIDINE, URINE: NEGATIVE
PLATELET # BLD: 346 K/UL (ref 140–450)
PLATELET ESTIMATE: ABNORMAL
PMV BLD AUTO: 7.1 FL (ref 6–12)
POTASSIUM SERPL-SCNC: 3.3 MMOL/L (ref 3.7–5.3)
PROPOXYPHENE, URINE: NEGATIVE
PROTEIN UA: NEGATIVE
RBC # BLD: 5.01 M/UL (ref 4.5–5.9)
RBC # BLD: ABNORMAL 10*6/UL
RBC UA: NORMAL /HPF (ref 0–4)
RENAL EPITHELIAL, UA: NORMAL /HPF
SALICYLATE LEVEL: <1 MG/DL (ref 3–10)
SEG NEUTROPHILS: 70 % (ref 44–74)
SEGMENTED NEUTROPHILS ABSOLUTE COUNT: 7.2 K/UL (ref 1.8–7.7)
SODIUM BLD-SCNC: 140 MMOL/L (ref 135–144)
SPECIFIC GRAVITY UA: 1.01 (ref 1.01–1.02)
TEST INFORMATION: ABNORMAL
THYROXINE, FREE: 1.52 NG/DL (ref 0.93–1.7)
TOTAL PROTEIN: 7.7 G/DL (ref 6.4–8.3)
TRICHOMONAS: NORMAL
TRICYCLIC ANTIDEPRESSANTS, UR: NEGATIVE
TSH SERPL DL<=0.05 MIU/L-ACNC: 0.53 MIU/L (ref 0.3–5)
TURBIDITY: ABNORMAL
URINE HGB: NEGATIVE
UROBILINOGEN, URINE: NORMAL
WBC # BLD: 10.3 K/UL (ref 3.5–11)
WBC # BLD: ABNORMAL 10*3/UL
WBC UA: NORMAL /HPF (ref 0–4)
YEAST: NORMAL

## 2019-07-01 PROCEDURE — G0480 DRUG TEST DEF 1-7 CLASSES: HCPCS

## 2019-07-01 PROCEDURE — 80053 COMPREHEN METABOLIC PANEL: CPT

## 2019-07-01 PROCEDURE — 6360000002 HC RX W HCPCS: Performed by: EMERGENCY MEDICINE

## 2019-07-01 PROCEDURE — 84443 ASSAY THYROID STIM HORMONE: CPT

## 2019-07-01 PROCEDURE — 93005 ELECTROCARDIOGRAM TRACING: CPT | Performed by: EMERGENCY MEDICINE

## 2019-07-01 PROCEDURE — 83735 ASSAY OF MAGNESIUM: CPT

## 2019-07-01 PROCEDURE — 85025 COMPLETE CBC W/AUTO DIFF WBC: CPT

## 2019-07-01 PROCEDURE — 83605 ASSAY OF LACTIC ACID: CPT

## 2019-07-01 PROCEDURE — 80307 DRUG TEST PRSMV CHEM ANLYZR: CPT

## 2019-07-01 PROCEDURE — 81001 URINALYSIS AUTO W/SCOPE: CPT

## 2019-07-01 PROCEDURE — 99285 EMERGENCY DEPT VISIT HI MDM: CPT

## 2019-07-01 PROCEDURE — 2580000003 HC RX 258: Performed by: EMERGENCY MEDICINE

## 2019-07-01 PROCEDURE — 83690 ASSAY OF LIPASE: CPT

## 2019-07-01 PROCEDURE — 84439 ASSAY OF FREE THYROXINE: CPT

## 2019-07-01 PROCEDURE — 80306 DRUG TEST PRSMV INSTRMNT: CPT

## 2019-07-01 PROCEDURE — 96374 THER/PROPH/DIAG INJ IV PUSH: CPT

## 2019-07-01 RX ORDER — 0.9 % SODIUM CHLORIDE 0.9 %
1000 INTRAVENOUS SOLUTION INTRAVENOUS ONCE
Status: COMPLETED | OUTPATIENT
Start: 2019-07-01 | End: 2019-07-01

## 2019-07-01 RX ORDER — CARBAMAZEPINE 100 MG/1
TABLET, CHEWABLE ORAL EVERY 12 HOURS
COMMUNITY
End: 2019-08-19

## 2019-07-01 RX ORDER — PRAZOSIN HYDROCHLORIDE 2 MG/1
2 CAPSULE ORAL NIGHTLY
Qty: 2 CAPSULE | Refills: 0 | Status: SHIPPED | OUTPATIENT
Start: 2019-07-01 | End: 2019-07-02 | Stop reason: SDUPTHER

## 2019-07-01 RX ORDER — LORAZEPAM 2 MG/ML
0.5 INJECTION INTRAMUSCULAR ONCE
Status: COMPLETED | OUTPATIENT
Start: 2019-07-01 | End: 2019-07-01

## 2019-07-01 RX ADMIN — SODIUM CHLORIDE 1000 ML: 9 INJECTION, SOLUTION INTRAVENOUS at 09:53

## 2019-07-01 RX ADMIN — LORAZEPAM 0.5 MG: 2 INJECTION INTRAMUSCULAR; INTRAVENOUS at 10:43

## 2019-07-01 NOTE — ED NOTES
Patient starting to hyperventilating and restless on bed. Mom in room. Patient tearful.       Yaima Grullon RN  07/01/19 363 Shlomo Tan RN  07/01/19 1150

## 2019-07-01 NOTE — ED PROVIDER NOTES
INITIAL VITALS:  height is 5' 10\" (1.778 m) and weight is 190 lb (86.2 kg). His tympanic temperature is 98.6 °F (37 °C). His blood pressure is 128/73 and his pulse is 67. His respiration is 18 and oxygen saturation is 99%. Physical Exam   Constitutional: He is oriented to person, place, and time. He appears well-developed and well-nourished. Patient is hyperventilating. HENT:   Head: Normocephalic and atraumatic. Mouth/Throat: Oropharynx is clear and moist.   Eyes: Pupils are equal, round, and reactive to light. Conjunctivae and EOM are normal.   Neck: Normal range of motion. Neck supple. No tracheal deviation present. Cardiovascular: Normal rate, regular rhythm and intact distal pulses. Pulmonary/Chest: Effort normal and breath sounds normal.   Abdominal: Soft. Bowel sounds are normal. He exhibits no distension. There is no tenderness. Musculoskeletal: Normal range of motion. He exhibits no edema or tenderness. Neurological: He is alert and oriented to person, place, and time. Skin: Skin is warm and dry. No rash noted. Psychiatric: He has a normal mood and affect. His behavior is normal. Judgment and thought content normal.   Vitals reviewed. DIFFERENTIAL DIAGNOSIS/ MDM:   Patient was placed on a nonrebreather at 2 L and this is calming down his hyperventilation syndrome as well as carpal pedal spasm that he was also presenting with. I will look for organic causes and will have a screener to come out and evaluate him. DIAGNOSTIC RESULTS     EKG:  None    RADIOLOGY:   Ct Abdomen Pelvis Wo Contrast    Result Date: 6/30/2019  EXAMINATION: CT OF THE ABDOMEN AND PELVIS WITHOUT CONTRAST 6/30/2019 12:42 pm TECHNIQUE: CT of the abdomen and pelvis was performed without the administration of intravenous contrast. Multiplanar reformatted images are provided for review.  Dose modulation, iterative reconstruction, and/or weight based adjustment of the mA/kV was utilized to reduce the radiation

## 2019-07-02 ENCOUNTER — OFFICE VISIT (OUTPATIENT)
Dept: FAMILY MEDICINE CLINIC | Age: 29
End: 2019-07-02
Payer: MEDICARE

## 2019-07-02 VITALS
HEART RATE: 87 BPM | HEIGHT: 70 IN | SYSTOLIC BLOOD PRESSURE: 108 MMHG | OXYGEN SATURATION: 98 % | WEIGHT: 199.4 LBS | DIASTOLIC BLOOD PRESSURE: 68 MMHG | BODY MASS INDEX: 28.55 KG/M2

## 2019-07-02 DIAGNOSIS — F41.1 GENERALIZED ANXIETY DISORDER WITH PANIC ATTACKS: ICD-10-CM

## 2019-07-02 DIAGNOSIS — R10.13 EPIGASTRIC PAIN: Primary | ICD-10-CM

## 2019-07-02 DIAGNOSIS — F41.0 GENERALIZED ANXIETY DISORDER WITH PANIC ATTACKS: ICD-10-CM

## 2019-07-02 DIAGNOSIS — F33.2 SEVERE EPISODE OF RECURRENT MAJOR DEPRESSIVE DISORDER, WITHOUT PSYCHOTIC FEATURES (HCC): ICD-10-CM

## 2019-07-02 PROCEDURE — G8417 CALC BMI ABV UP PARAM F/U: HCPCS | Performed by: FAMILY MEDICINE

## 2019-07-02 PROCEDURE — 1036F TOBACCO NON-USER: CPT | Performed by: FAMILY MEDICINE

## 2019-07-02 PROCEDURE — G8427 DOCREV CUR MEDS BY ELIG CLIN: HCPCS | Performed by: FAMILY MEDICINE

## 2019-07-02 PROCEDURE — 99213 OFFICE O/P EST LOW 20 MIN: CPT | Performed by: FAMILY MEDICINE

## 2019-07-02 RX ORDER — HYDROXYZINE 50 MG/1
50 TABLET, FILM COATED ORAL 3 TIMES DAILY PRN
Qty: 90 TABLET | Refills: 5 | Status: SHIPPED | OUTPATIENT
Start: 2019-07-02 | End: 2021-03-15

## 2019-07-02 RX ORDER — TRAZODONE HYDROCHLORIDE 100 MG/1
100 TABLET ORAL NIGHTLY PRN
Qty: 30 TABLET | Refills: 0 | Status: SHIPPED | OUTPATIENT
Start: 2019-07-02 | End: 2020-02-13

## 2019-07-02 RX ORDER — ACETAMINOPHEN 160 MG
4000 TABLET,DISINTEGRATING ORAL DAILY
Qty: 60 CAPSULE | Refills: 5 | Status: SHIPPED | OUTPATIENT
Start: 2019-07-02 | End: 2021-06-22

## 2019-07-02 RX ORDER — DICYCLOMINE HYDROCHLORIDE 10 MG/1
10 CAPSULE ORAL 4 TIMES DAILY
Qty: 120 CAPSULE | Refills: 5 | Status: SHIPPED | OUTPATIENT
Start: 2019-07-02 | End: 2021-03-15

## 2019-07-02 ASSESSMENT — ENCOUNTER SYMPTOMS
WHEEZING: 0
CHEST TIGHTNESS: 0
CONSTIPATION: 0
ABDOMINAL PAIN: 1
SHORTNESS OF BREATH: 0
COUGH: 0
DIARRHEA: 0

## 2019-07-19 ENCOUNTER — HOSPITAL ENCOUNTER (EMERGENCY)
Age: 29
Discharge: HOME | End: 2019-07-19
Attending: EMERGENCY MEDICINE
Payer: MEDICARE

## 2019-07-19 VITALS
DIASTOLIC BLOOD PRESSURE: 78 MMHG | SYSTOLIC BLOOD PRESSURE: 114 MMHG | BODY MASS INDEX: 29.92 KG/M2 | TEMPERATURE: 99.3 F | OXYGEN SATURATION: 98 % | HEIGHT: 70 IN | WEIGHT: 209 LBS | RESPIRATION RATE: 18 BRPM | HEART RATE: 88 BPM

## 2019-07-19 DIAGNOSIS — F41.1 ANXIETY STATE: Primary | ICD-10-CM

## 2019-07-19 PROCEDURE — 96372 THER/PROPH/DIAG INJ SC/IM: CPT

## 2019-07-19 PROCEDURE — 99282 EMERGENCY DEPT VISIT SF MDM: CPT

## 2019-07-19 PROCEDURE — 6360000002 HC RX W HCPCS: Performed by: EMERGENCY MEDICINE

## 2019-07-19 RX ORDER — LORAZEPAM 2 MG/ML
1 INJECTION INTRAMUSCULAR ONCE
Status: COMPLETED | OUTPATIENT
Start: 2019-07-19 | End: 2019-07-19

## 2019-07-19 RX ADMIN — LORAZEPAM 1 MG: 2 INJECTION INTRAMUSCULAR; INTRAVENOUS at 08:45

## 2019-08-06 ENCOUNTER — HOSPITAL ENCOUNTER (OUTPATIENT)
Age: 29
Setting detail: OBSERVATION
Discharge: HOME | End: 2019-08-07
Attending: EMERGENCY MEDICINE | Admitting: FAMILY MEDICINE
Payer: MEDICARE

## 2019-08-06 ENCOUNTER — APPOINTMENT (OUTPATIENT)
Dept: CT IMAGING | Age: 29
End: 2019-08-06
Payer: MEDICARE

## 2019-08-06 DIAGNOSIS — F41.1 ANXIETY STATE: ICD-10-CM

## 2019-08-06 DIAGNOSIS — R10.9 ABDOMINAL PAIN, UNSPECIFIED ABDOMINAL LOCATION: ICD-10-CM

## 2019-08-06 DIAGNOSIS — R00.0 TACHYCARDIA: Primary | ICD-10-CM

## 2019-08-06 LAB
-: ABNORMAL
ABSOLUTE EOS #: 0 K/UL (ref 0–0.4)
ABSOLUTE IMMATURE GRANULOCYTE: ABNORMAL K/UL (ref 0–0.3)
ABSOLUTE LYMPH #: 4.8 K/UL (ref 1–4.8)
ABSOLUTE MONO #: 1.6 K/UL (ref 0.1–1.2)
ACETAMINOPHEN LEVEL: <5 UG/ML (ref 10–30)
ALBUMIN SERPL-MCNC: 5.1 G/DL (ref 3.5–5.2)
ALBUMIN/GLOBULIN RATIO: 1.6 (ref 1–2.5)
ALP BLD-CCNC: 88 U/L (ref 40–129)
ALT SERPL-CCNC: 20 U/L (ref 5–41)
AMORPHOUS: ABNORMAL
AMPHETAMINE SCREEN URINE: NEGATIVE
ANION GAP SERPL CALCULATED.3IONS-SCNC: 22 MMOL/L (ref 9–17)
AST SERPL-CCNC: 13 U/L
BACTERIA: ABNORMAL
BARBITURATE SCREEN URINE: NEGATIVE
BASOPHILS # BLD: 1 % (ref 0–2)
BASOPHILS ABSOLUTE: 0.1 K/UL (ref 0–0.2)
BENZODIAZEPINE SCREEN, URINE: POSITIVE
BILIRUB SERPL-MCNC: 1.21 MG/DL (ref 0.3–1.2)
BILIRUBIN URINE: ABNORMAL
BUN BLDV-MCNC: 17 MG/DL (ref 6–20)
BUN/CREAT BLD: 15 (ref 9–20)
BUPRENORPHINE URINE: NEGATIVE
CALCIUM SERPL-MCNC: 10.6 MG/DL (ref 8.6–10.4)
CANNABINOID SCREEN URINE: POSITIVE
CASTS UA: ABNORMAL /LPF (ref 0–2)
CHLORIDE BLD-SCNC: 98 MMOL/L (ref 98–107)
CO2: 16 MMOL/L (ref 20–31)
COCAINE METABOLITE, URINE: NEGATIVE
COLOR: ABNORMAL
COMMENT UA: ABNORMAL
CREAT SERPL-MCNC: 1.17 MG/DL (ref 0.7–1.2)
CRYSTALS, UA: ABNORMAL /HPF
DIFFERENTIAL TYPE: ABNORMAL
EOSINOPHILS RELATIVE PERCENT: 0 % (ref 1–8)
EPITHELIAL CELLS UA: ABNORMAL /HPF (ref 0–5)
ETHANOL PERCENT: NORMAL %
ETHANOL: <10 MG/DL
GFR AFRICAN AMERICAN: >60 ML/MIN
GFR NON-AFRICAN AMERICAN: >60 ML/MIN
GFR SERPL CREATININE-BSD FRML MDRD: ABNORMAL ML/MIN/{1.73_M2}
GFR SERPL CREATININE-BSD FRML MDRD: ABNORMAL ML/MIN/{1.73_M2}
GLUCOSE BLD-MCNC: 107 MG/DL (ref 70–99)
GLUCOSE URINE: NEGATIVE
HCT VFR BLD CALC: 45.6 % (ref 41–53)
HEMOGLOBIN: 15.7 G/DL (ref 13.5–17.5)
IMMATURE GRANULOCYTES: ABNORMAL %
KETONES, URINE: ABNORMAL
LEUKOCYTE ESTERASE, URINE: NEGATIVE
LIPASE: 12 U/L (ref 13–60)
LYMPHOCYTES # BLD: 25 % (ref 15–43)
MAGNESIUM: 2 MG/DL (ref 1.6–2.6)
MCH RBC QN AUTO: 29.7 PG (ref 26–34)
MCHC RBC AUTO-ENTMCNC: 34.4 G/DL (ref 31–37)
MCV RBC AUTO: 86.2 FL (ref 80–100)
MDMA URINE: ABNORMAL
METHADONE SCREEN, URINE: NEGATIVE
METHAMPHETAMINE, URINE: NEGATIVE
MONOCYTES # BLD: 8 % (ref 6–14)
MUCUS: ABNORMAL
NITRITE, URINE: NEGATIVE
NRBC AUTOMATED: ABNORMAL PER 100 WBC
OPIATES, URINE: NEGATIVE
OTHER OBSERVATIONS UA: ABNORMAL
OXYCODONE SCREEN URINE: NEGATIVE
PDW BLD-RTO: 14.2 % (ref 11–14.5)
PH UA: 6 (ref 5–6)
PHENCYCLIDINE, URINE: NEGATIVE
PLATELET # BLD: 514 K/UL (ref 140–450)
PLATELET ESTIMATE: ABNORMAL
PMV BLD AUTO: 7.2 FL (ref 6–12)
POTASSIUM SERPL-SCNC: 3.7 MMOL/L (ref 3.7–5.3)
PROPOXYPHENE, URINE: NEGATIVE
PROTEIN UA: ABNORMAL
RBC # BLD: 5.29 M/UL (ref 4.5–5.9)
RBC # BLD: ABNORMAL 10*6/UL
RBC UA: ABNORMAL /HPF (ref 0–4)
RENAL EPITHELIAL, UA: ABNORMAL /HPF
SALICYLATE LEVEL: <1 MG/DL (ref 3–10)
SEG NEUTROPHILS: 66 % (ref 44–74)
SEGMENTED NEUTROPHILS ABSOLUTE COUNT: 12.7 K/UL (ref 1.8–7.7)
SODIUM BLD-SCNC: 136 MMOL/L (ref 135–144)
SPECIFIC GRAVITY UA: 1.03 (ref 1.01–1.02)
TEST INFORMATION: ABNORMAL
TOTAL PROTEIN: 8.3 G/DL (ref 6.4–8.3)
TRICHOMONAS: ABNORMAL
TRICYCLIC ANTIDEPRESSANTS, UR: NEGATIVE
TSH SERPL DL<=0.05 MIU/L-ACNC: 1.38 MIU/L (ref 0.3–5)
TURBIDITY: ABNORMAL
URINE HGB: NEGATIVE
UROBILINOGEN, URINE: NORMAL
WBC # BLD: 19.3 K/UL (ref 3.5–11)
WBC # BLD: ABNORMAL 10*3/UL
WBC UA: ABNORMAL /HPF (ref 0–4)
YEAST: ABNORMAL

## 2019-08-06 PROCEDURE — 80307 DRUG TEST PRSMV CHEM ANLYZR: CPT

## 2019-08-06 PROCEDURE — 84443 ASSAY THYROID STIM HORMONE: CPT

## 2019-08-06 PROCEDURE — 85025 COMPLETE CBC W/AUTO DIFF WBC: CPT

## 2019-08-06 PROCEDURE — 99285 EMERGENCY DEPT VISIT HI MDM: CPT

## 2019-08-06 PROCEDURE — 81001 URINALYSIS AUTO W/SCOPE: CPT

## 2019-08-06 PROCEDURE — 6360000004 HC RX CONTRAST MEDICATION: Performed by: EMERGENCY MEDICINE

## 2019-08-06 PROCEDURE — 80306 DRUG TEST PRSMV INSTRMNT: CPT

## 2019-08-06 PROCEDURE — 83735 ASSAY OF MAGNESIUM: CPT

## 2019-08-06 PROCEDURE — 83690 ASSAY OF LIPASE: CPT

## 2019-08-06 PROCEDURE — 2580000003 HC RX 258: Performed by: EMERGENCY MEDICINE

## 2019-08-06 PROCEDURE — 6360000002 HC RX W HCPCS: Performed by: EMERGENCY MEDICINE

## 2019-08-06 PROCEDURE — G0480 DRUG TEST DEF 1-7 CLASSES: HCPCS

## 2019-08-06 PROCEDURE — 80053 COMPREHEN METABOLIC PANEL: CPT

## 2019-08-06 PROCEDURE — 96374 THER/PROPH/DIAG INJ IV PUSH: CPT

## 2019-08-06 PROCEDURE — 74177 CT ABD & PELVIS W/CONTRAST: CPT

## 2019-08-06 PROCEDURE — 96375 TX/PRO/DX INJ NEW DRUG ADDON: CPT

## 2019-08-06 PROCEDURE — 93005 ELECTROCARDIOGRAM TRACING: CPT | Performed by: EMERGENCY MEDICINE

## 2019-08-06 RX ORDER — ONDANSETRON 2 MG/ML
4 INJECTION INTRAMUSCULAR; INTRAVENOUS ONCE
Status: COMPLETED | OUTPATIENT
Start: 2019-08-06 | End: 2019-08-06

## 2019-08-06 RX ORDER — 0.9 % SODIUM CHLORIDE 0.9 %
1000 INTRAVENOUS SOLUTION INTRAVENOUS ONCE
Status: COMPLETED | OUTPATIENT
Start: 2019-08-06 | End: 2019-08-07

## 2019-08-06 RX ORDER — 0.9 % SODIUM CHLORIDE 0.9 %
1000 INTRAVENOUS SOLUTION INTRAVENOUS ONCE
Status: COMPLETED | OUTPATIENT
Start: 2019-08-06 | End: 2019-08-06

## 2019-08-06 RX ORDER — LORAZEPAM 2 MG/ML
1 INJECTION INTRAMUSCULAR ONCE
Status: COMPLETED | OUTPATIENT
Start: 2019-08-06 | End: 2019-08-06

## 2019-08-06 RX ADMIN — IOPAMIDOL 100 ML: 755 INJECTION, SOLUTION INTRAVENOUS at 23:31

## 2019-08-06 RX ADMIN — LORAZEPAM 1 MG: 2 INJECTION INTRAMUSCULAR; INTRAVENOUS at 21:42

## 2019-08-06 RX ADMIN — SODIUM CHLORIDE 1000 ML: 9 INJECTION, SOLUTION INTRAVENOUS at 21:43

## 2019-08-06 RX ADMIN — SODIUM CHLORIDE 1000 ML: 9 INJECTION, SOLUTION INTRAVENOUS at 23:42

## 2019-08-06 RX ADMIN — ONDANSETRON 4 MG: 2 INJECTION INTRAMUSCULAR; INTRAVENOUS at 21:42

## 2019-08-06 ASSESSMENT — ENCOUNTER SYMPTOMS
SORE THROAT: 0
SHORTNESS OF BREATH: 0
NAUSEA: 1
DIARRHEA: 0
VOMITING: 1

## 2019-08-06 ASSESSMENT — PAIN DESCRIPTION - DESCRIPTORS: DESCRIPTORS: CRAMPING

## 2019-08-06 ASSESSMENT — PAIN DESCRIPTION - LOCATION: LOCATION: ABDOMEN

## 2019-08-06 ASSESSMENT — PAIN SCALES - GENERAL: PAINLEVEL_OUTOF10: 8

## 2019-08-06 ASSESSMENT — PAIN DESCRIPTION - PAIN TYPE: TYPE: ACUTE PAIN

## 2019-08-07 ENCOUNTER — TELEPHONE (OUTPATIENT)
Dept: FAMILY MEDICINE CLINIC | Age: 29
End: 2019-08-07

## 2019-08-07 VITALS
DIASTOLIC BLOOD PRESSURE: 53 MMHG | WEIGHT: 197.97 LBS | OXYGEN SATURATION: 98 % | BODY MASS INDEX: 28.34 KG/M2 | HEART RATE: 70 BPM | SYSTOLIC BLOOD PRESSURE: 99 MMHG | TEMPERATURE: 98.3 F | RESPIRATION RATE: 14 BRPM | HEIGHT: 70 IN

## 2019-08-07 PROBLEM — R00.0 TACHYCARDIA: Status: ACTIVE | Noted: 2019-08-07

## 2019-08-07 PROBLEM — F33.3 MDD (MAJOR DEPRESSIVE DISORDER), RECURRENT, SEVERE, WITH PSYCHOSIS (HCC): Chronic | Status: ACTIVE | Noted: 2019-01-29

## 2019-08-07 LAB
ANION GAP SERPL CALCULATED.3IONS-SCNC: 13 MMOL/L (ref 9–17)
BUN BLDV-MCNC: 13 MG/DL (ref 6–20)
BUN/CREAT BLD: 14 (ref 9–20)
CALCIUM SERPL-MCNC: 8.8 MG/DL (ref 8.6–10.4)
CHLORIDE BLD-SCNC: 107 MMOL/L (ref 98–107)
CO2: 22 MMOL/L (ref 20–31)
CREAT SERPL-MCNC: 0.93 MG/DL (ref 0.7–1.2)
EKG ATRIAL RATE: 88 BPM
EKG P AXIS: 66 DEGREES
EKG P-R INTERVAL: 134 MS
EKG Q-T INTERVAL: 388 MS
EKG QRS DURATION: 82 MS
EKG QTC CALCULATION (BAZETT): 469 MS
EKG R AXIS: 61 DEGREES
EKG T AXIS: 50 DEGREES
EKG VENTRICULAR RATE: 88 BPM
GFR AFRICAN AMERICAN: >60 ML/MIN
GFR NON-AFRICAN AMERICAN: >60 ML/MIN
GFR SERPL CREATININE-BSD FRML MDRD: ABNORMAL ML/MIN/{1.73_M2}
GFR SERPL CREATININE-BSD FRML MDRD: ABNORMAL ML/MIN/{1.73_M2}
GLUCOSE BLD-MCNC: 110 MG/DL (ref 70–99)
HCT VFR BLD CALC: 39 % (ref 41–53)
HEMOGLOBIN: 13.3 G/DL (ref 13.5–17.5)
INR BLD: 1.1
MCH RBC QN AUTO: 29.7 PG (ref 26–34)
MCHC RBC AUTO-ENTMCNC: 34.1 G/DL (ref 31–37)
MCV RBC AUTO: 87.3 FL (ref 80–100)
NRBC AUTOMATED: ABNORMAL PER 100 WBC
PDW BLD-RTO: 14.2 % (ref 11–14.5)
PLATELET # BLD: 383 K/UL (ref 140–450)
PMV BLD AUTO: 7.1 FL (ref 6–12)
POTASSIUM SERPL-SCNC: 3.9 MMOL/L (ref 3.7–5.3)
PROTHROMBIN TIME: 11 SEC (ref 9.4–11.3)
RBC # BLD: 4.47 M/UL (ref 4.5–5.9)
SODIUM BLD-SCNC: 142 MMOL/L (ref 135–144)
WBC # BLD: 12 K/UL (ref 3.5–11)

## 2019-08-07 PROCEDURE — APPSS30 APP SPLIT SHARED TIME 16-30 MINUTES: Performed by: NURSE PRACTITIONER

## 2019-08-07 PROCEDURE — 96376 TX/PRO/DX INJ SAME DRUG ADON: CPT

## 2019-08-07 PROCEDURE — 80048 BASIC METABOLIC PNL TOTAL CA: CPT

## 2019-08-07 PROCEDURE — 96361 HYDRATE IV INFUSION ADD-ON: CPT

## 2019-08-07 PROCEDURE — 2580000003 HC RX 258: Performed by: NURSE PRACTITIONER

## 2019-08-07 PROCEDURE — 36415 COLL VENOUS BLD VENIPUNCTURE: CPT

## 2019-08-07 PROCEDURE — G0378 HOSPITAL OBSERVATION PER HR: HCPCS

## 2019-08-07 PROCEDURE — 85027 COMPLETE CBC AUTOMATED: CPT

## 2019-08-07 PROCEDURE — 6360000002 HC RX W HCPCS: Performed by: NURSE PRACTITIONER

## 2019-08-07 PROCEDURE — 6370000000 HC RX 637 (ALT 250 FOR IP): Performed by: NURSE PRACTITIONER

## 2019-08-07 PROCEDURE — 85610 PROTHROMBIN TIME: CPT

## 2019-08-07 PROCEDURE — 99223 1ST HOSP IP/OBS HIGH 75: CPT | Performed by: INTERNAL MEDICINE

## 2019-08-07 PROCEDURE — 6370000000 HC RX 637 (ALT 250 FOR IP)

## 2019-08-07 PROCEDURE — 94760 N-INVAS EAR/PLS OXIMETRY 1: CPT

## 2019-08-07 RX ORDER — POTASSIUM CHLORIDE 7.45 MG/ML
10 INJECTION INTRAVENOUS PRN
Status: DISCONTINUED | OUTPATIENT
Start: 2019-08-07 | End: 2019-08-07 | Stop reason: HOSPADM

## 2019-08-07 RX ORDER — TRAZODONE HYDROCHLORIDE 50 MG/1
100 TABLET ORAL NIGHTLY PRN
Status: DISCONTINUED | OUTPATIENT
Start: 2019-08-07 | End: 2019-08-07 | Stop reason: HOSPADM

## 2019-08-07 RX ORDER — MAGNESIUM SULFATE 1 G/100ML
1 INJECTION INTRAVENOUS PRN
Status: DISCONTINUED | OUTPATIENT
Start: 2019-08-07 | End: 2019-08-07 | Stop reason: HOSPADM

## 2019-08-07 RX ORDER — PANTOPRAZOLE SODIUM 20 MG/1
20 TABLET, DELAYED RELEASE ORAL 2 TIMES DAILY
Status: DISCONTINUED | OUTPATIENT
Start: 2019-08-07 | End: 2019-08-07 | Stop reason: HOSPADM

## 2019-08-07 RX ORDER — CITALOPRAM 20 MG/1
40 TABLET ORAL DAILY
Status: DISCONTINUED | OUTPATIENT
Start: 2019-08-07 | End: 2019-08-07 | Stop reason: HOSPADM

## 2019-08-07 RX ORDER — POTASSIUM CHLORIDE 20 MEQ/1
40 TABLET, EXTENDED RELEASE ORAL PRN
Status: DISCONTINUED | OUTPATIENT
Start: 2019-08-07 | End: 2019-08-07 | Stop reason: HOSPADM

## 2019-08-07 RX ORDER — ACETAMINOPHEN 325 MG/1
650 TABLET ORAL EVERY 4 HOURS PRN
Status: DISCONTINUED | OUTPATIENT
Start: 2019-08-07 | End: 2019-08-07 | Stop reason: HOSPADM

## 2019-08-07 RX ORDER — FLUTICASONE PROPIONATE 50 MCG
1 SPRAY, SUSPENSION (ML) NASAL DAILY
Status: DISCONTINUED | OUTPATIENT
Start: 2019-08-07 | End: 2019-08-07 | Stop reason: HOSPADM

## 2019-08-07 RX ORDER — PRAZOSIN HYDROCHLORIDE 2 MG/1
2 CAPSULE ORAL NIGHTLY
Status: DISCONTINUED | OUTPATIENT
Start: 2019-08-07 | End: 2019-08-07 | Stop reason: HOSPADM

## 2019-08-07 RX ORDER — SODIUM CHLORIDE 9 MG/ML
INJECTION, SOLUTION INTRAVENOUS CONTINUOUS
Status: DISCONTINUED | OUTPATIENT
Start: 2019-08-07 | End: 2019-08-07 | Stop reason: HOSPADM

## 2019-08-07 RX ORDER — DICYCLOMINE HYDROCHLORIDE 10 MG/1
10 CAPSULE ORAL 4 TIMES DAILY
Status: DISCONTINUED | OUTPATIENT
Start: 2019-08-07 | End: 2019-08-07 | Stop reason: HOSPADM

## 2019-08-07 RX ORDER — LORAZEPAM 2 MG/ML
1 INJECTION INTRAMUSCULAR EVERY 6 HOURS PRN
Status: DISCONTINUED | OUTPATIENT
Start: 2019-08-07 | End: 2019-08-07

## 2019-08-07 RX ORDER — ONDANSETRON 2 MG/ML
4 INJECTION INTRAMUSCULAR; INTRAVENOUS EVERY 6 HOURS PRN
Status: DISCONTINUED | OUTPATIENT
Start: 2019-08-07 | End: 2019-08-07 | Stop reason: HOSPADM

## 2019-08-07 RX ORDER — HYDROXYZINE HYDROCHLORIDE 25 MG/1
50 TABLET, FILM COATED ORAL 3 TIMES DAILY PRN
Status: DISCONTINUED | OUTPATIENT
Start: 2019-08-07 | End: 2019-08-07 | Stop reason: HOSPADM

## 2019-08-07 RX ORDER — MIRTAZAPINE 15 MG/1
30 TABLET, FILM COATED ORAL NIGHTLY
Status: DISCONTINUED | OUTPATIENT
Start: 2019-08-07 | End: 2019-08-07 | Stop reason: HOSPADM

## 2019-08-07 RX ORDER — LORAZEPAM 0.5 MG/1
0.5 TABLET ORAL EVERY 4 HOURS PRN
Status: DISCONTINUED | OUTPATIENT
Start: 2019-08-07 | End: 2019-08-07 | Stop reason: HOSPADM

## 2019-08-07 RX ORDER — SUCRALFATE 1 G/1
1 TABLET ORAL 4 TIMES DAILY
Status: DISCONTINUED | OUTPATIENT
Start: 2019-08-07 | End: 2019-08-07 | Stop reason: HOSPADM

## 2019-08-07 RX ORDER — SODIUM CHLORIDE 0.9 % (FLUSH) 0.9 %
10 SYRINGE (ML) INJECTION EVERY 12 HOURS SCHEDULED
Status: DISCONTINUED | OUTPATIENT
Start: 2019-08-07 | End: 2019-08-07 | Stop reason: HOSPADM

## 2019-08-07 RX ORDER — SODIUM CHLORIDE 0.9 % (FLUSH) 0.9 %
10 SYRINGE (ML) INJECTION PRN
Status: DISCONTINUED | OUTPATIENT
Start: 2019-08-07 | End: 2019-08-07 | Stop reason: HOSPADM

## 2019-08-07 RX ORDER — MELATONIN
4000 DAILY
Status: DISCONTINUED | OUTPATIENT
Start: 2019-08-07 | End: 2019-08-07 | Stop reason: HOSPADM

## 2019-08-07 RX ORDER — LORAZEPAM 0.5 MG/1
TABLET ORAL
Status: COMPLETED
Start: 2019-08-07 | End: 2019-08-07

## 2019-08-07 RX ORDER — NICOTINE 21 MG/24HR
1 PATCH, TRANSDERMAL 24 HOURS TRANSDERMAL DAILY PRN
Status: DISCONTINUED | OUTPATIENT
Start: 2019-08-07 | End: 2019-08-07

## 2019-08-07 RX ADMIN — SUCRALFATE 1 G: 1 TABLET ORAL at 08:50

## 2019-08-07 RX ADMIN — LORAZEPAM 0.5 MG: 0.5 TABLET ORAL at 09:07

## 2019-08-07 RX ADMIN — ONDANSETRON 4 MG: 2 INJECTION INTRAMUSCULAR; INTRAVENOUS at 10:22

## 2019-08-07 RX ADMIN — SODIUM CHLORIDE: 9 INJECTION, SOLUTION INTRAVENOUS at 02:14

## 2019-08-07 RX ADMIN — DICYCLOMINE HYDROCHLORIDE 10 MG: 10 CAPSULE ORAL at 08:50

## 2019-08-07 RX ADMIN — CITALOPRAM HYDROBROMIDE 40 MG: 20 TABLET ORAL at 08:50

## 2019-08-07 ASSESSMENT — PAIN SCALES - GENERAL
PAINLEVEL_OUTOF10: 8
PAINLEVEL_OUTOF10: 0

## 2019-08-07 ASSESSMENT — PAIN DESCRIPTION - PAIN TYPE: TYPE: ACUTE PAIN

## 2019-08-07 ASSESSMENT — PAIN DESCRIPTION - LOCATION: LOCATION: ABDOMEN

## 2019-08-07 NOTE — PROGRESS NOTES
Hospitalist Progress Note    Patient:  Doroteo Champagne     YOB: 1990    MRN: [id number]   Admit date: 8/6/2019     Acct: [de-identified]     PCP: Sabra Barbosa MD    CC--Interval History: anxiety--no SI-HI--well nourished appearing despite his claim that he has not been eating----patient alert and cooperative until he was advised that he was going home today---became more agitated and tried to throw himself on the floor--advised by male staff nurse to get up and walk which he did without difficulty--some representation that the patient was tachycardic--clearly not the case----BUN CRE normal range such that representation of prolonged lack of fluids not substantiated---no vomiting demonstrated---patient discharged to home----8.7.2019---follow up with psychiatry outpatient and personal physician with patient known history of hallucinations--anxiety    Marijuana usage--for which he states he has \"has a card\" and there are \"guys all over [facility] can provide marijuana]\"--states that he receives marijuana from a Dr. Dipika Jorgensen, [place], [place]    Patient eges-qwecserukh-vbsuzrow-available records reviewed, including, but not limited to, ER reports--labs--imaging---EKGs---office records---personal notes    All other ROS negative except noted in HPI    Diet:  DIET GENERAL;    Medications:  Scheduled Meds:   brexpiprazole  2 mg Oral Daily    vitamin D3  4,000 Units Oral Daily    citalopram  40 mg Oral Daily    dicyclomine  10 mg Oral 4x Daily    fluticasone  1 spray Each Nostril Daily    mirtazapine  30 mg Oral Nightly    pantoprazole  20 mg Oral BID    prazosin  2 mg Oral Nightly    sucralfate  1 g Oral 4x Daily    sodium chloride flush  10 mL Intravenous 2 times per day    enoxaparin  40 mg Subcutaneous Daily     Continuous Infusions:   sodium chloride Stopped (08/07/19 1255)     PRN Meds:hydrOXYzine, traZODone, sodium chloride flush, potassium chloride **OR** potassium alternative oral replacement **OR** potassium chloride, magnesium sulfate, magnesium hydroxide, ondansetron, acetaminophen, LORazepam    Objective:  Labs:  CBC with Differential:    Lab Results   Component Value Date    WBC 12.0 08/07/2019    RBC 4.47 08/07/2019    HGB 13.3 08/07/2019    HCT 39.0 08/07/2019     08/07/2019    MCV 87.3 08/07/2019    MCH 29.7 08/07/2019    MCHC 34.1 08/07/2019    RDW 14.2 08/07/2019    LYMPHOPCT 25 08/06/2019    MONOPCT 8 08/06/2019    BASOPCT 1 08/06/2019    MONOSABS 1.60 08/06/2019    LYMPHSABS 4.80 08/06/2019    EOSABS 0.00 08/06/2019    BASOSABS 0.10 08/06/2019    DIFFTYPE NOT REPORTED 08/06/2019     BMP:    Lab Results   Component Value Date     08/07/2019    K 3.9 08/07/2019     08/07/2019    CO2 22 08/07/2019    BUN 13 08/07/2019    LABALBU 5.1 08/06/2019    CREATININE 0.93 08/07/2019    CALCIUM 8.8 08/07/2019    GFRAA >60 08/07/2019    LABGLOM >60 08/07/2019    GLUCOSE 110 08/07/2019           Physical Exam:  Vitals: BP (!) 99/53   Pulse 70   Temp 98.3 °F (36.8 °C) (Tympanic)   Resp 14   Ht 5' 10\" (1.778 m)   Wt 197 lb 15.6 oz (89.8 kg)   SpO2 98%   BMI 28.41 kg/m²   24 hour intake/output:No intake or output data in the 24 hours ending 08/07/19 1428  Last 3 weights: Wt Readings from Last 3 Encounters:   08/07/19 197 lb 15.6 oz (89.8 kg)   07/19/19 209 lb (94.8 kg)   07/02/19 199 lb 6.4 oz (90.4 kg)     HEENT: PERRLA, Mucosa Pink, Moist, Normocephalic and Atraumatic  Neck: Supple, Carotid Pulses Present, No Masses, Tenderness, Nodularity and No Lymphadenopathy  Chest/Lungs: Clear to Auscultation without Rales, Rhonchi, or Wheezes  Cardiac: Regular Rate and Rhythm  GI/Abdomen:  Bowel Sounds Present and Soft, Non-tender, without Guarding or Rebound Tenderness  : Not examined  EXT/Skin: No Edema, No Cyanosis and No Clubbing  Neuro: alert--no SI-HI--, No Localizing Signs/Symptoms, Cranial Nerves II-XII Grossly Intact and Motor Sensory Intact      Assessment:    Active Problems:

## 2019-08-07 NOTE — H&P
by Each Nare route daily  promethazine (PHENERGAN) 25 MG tablet, Take 1 tablet by mouth every 6 hours as needed for Nausea  citalopram (CELEXA) 40 MG tablet, Take 40 mg by mouth daily    Allergies:    Haldol [haloperidol] and Zoloft [sertraline hcl]    Social History:    reports that he quit smoking about 2 months ago. His smoking use included cigarettes. He smoked 0.50 packs per day. He has never used smokeless tobacco. He reports that he drinks about 2.0 standard drinks of alcohol per week. He reports that he has current or past drug history. Drug: Marijuana. Frequency: 21.00 times per week. Family History:   family history includes Allergies in his brother; Cancer in his maternal uncle; Crohn's Disease in his maternal uncle; Kidney Disease in his mother; Other in his father and mother. REVIEW OF SYSTEMS:  See HPI and problem list; otherwise no other new complaints with respect to eyes, ENT, neck, pulmonary, coronary, chest, GI, , endocrine, musculoskeletal, immune system/connective tissue disease, hematologic, neurologic, psychiatric, skin, lymphatics, or malignancies. Code status discussed with patient/family--wishes for Full Code at this time. PHYSICAL EXAM:  Vitals:  BP (!) 99/53   Pulse 70   Temp 98.3 °F (36.8 °C) (Tympanic)   Resp 14   Ht 5' 10\" (1.778 m)   Wt 197 lb 15.6 oz (89.8 kg)   SpO2 98%   BMI 28.41 kg/m²     General: awake, alert, cooperative and uncooperative at times  HEENT: Mucosa Pink, Moist, External nose normal, Normocephalic and Atraumatic  Neck: Supple, No Masses, Tenderness, Nodularity and No Lymphadenopathy  Chest/Lungs: Clear to Auscultation without Rales, Rhonchi, or Wheezes and Respirations even and unlabored  Cardiac: Regular Rate and Rhythm and Pedal Pulses Palpable Bilaterally  GI/Abdomen:  Bowel Sounds Present, Soft, Non-tender, without Guarding or Rebound Tenderness and No Masses  : Not examined  Extremities/Musculoskeletal: All four extremities without edema

## 2019-08-07 NOTE — ED PROVIDER NOTES
past surgical history that includes Appendectomy; Upper gastrointestinal endoscopy; Colonoscopy; and [place] tooth extraction.     CURRENT MEDICATIONS       Discharge Medication List as of 8/7/2019  1:31 PM      CONTINUE these medications which have NOT CHANGED    Details   dicyclomine (BENTYL) 10 MG capsule Take 1 capsule by mouth 4 times daily, Disp-120 capsule, R-5Normal      hydrOXYzine (ATARAX) 50 MG tablet Take 1 tablet by mouth 3 times daily as needed for Itching, Disp-90 tablet, R-5Normal      traZODone (DESYREL) 100 MG tablet Take 1 tablet by mouth nightly as needed for Sleep, Disp-30 tablet, R-0Normal      pantoprazole (PROTONIX) 20 MG tablet TAKE 1 TABLET BY MOUTH TWICE DAILY, Disp-60 tablet, R-5Please consider 90 day supplies to promote better adherenceNormal      prazosin (MINIPRESS) 2 MG capsule Take 1 capsule by mouth nightly, Disp-30 capsule, R-0Print      sucralfate (CARAFATE) 1 GM tablet Take 1 tablet by mouth 4 times daily, Disp-120 tablet, R-5Normal      mirtazapine (REMERON) 30 MG tablet Take 1 tablet by mouth nightly, Disp-30 tablet, R-0Normal      Cholecalciferol (VITAMIN D3) 2000 units CAPS Take 2 capsules by mouth daily, Disp-60 capsule, R-5Normal      carBAMazepine (TEGRETOL) 100 MG chewable tablet Take by mouth every 12 hoursHistorical Med      ondansetron (ZOFRAN) 4 MG tablet Take 1 tablet by mouth every 8 hours as needed for Nausea, Disp-20 tablet, R-0Print      Ergocalciferol (VITAMIN D2 PO) Take 1 capsule by mouth once a week Indications: 50,00 IUHistorical Med      brexpiprazole (REXULTI) 2 MG TABS tablet Take 1 tablet by mouth daily, Disp-30 tablet, R-0Print      fluticasone (FLONASE) 50 MCG/ACT nasal spray 1 spray by Each Nare route daily, Disp-1 Bottle, R-3Normal      promethazine (PHENERGAN) 25 MG tablet Take 1 tablet by mouth every 6 hours as needed for Nausea, Disp-30 tablet, R-2Normal      citalopram (CELEXA) 40 MG tablet Take 40 mg by mouth dailyHistorical Med Ur NEGATIVE NEGATIVE    Benzodiazepine Screen, Urine POSITIVE (A) NEGATIVE    Cocaine Metabolite, Urine NEGATIVE NEGATIVE    Methadone Screen, Urine NEGATIVE NEGATIVE    Opiates, Urine NEGATIVE NEGATIVE    Phencyclidine, Urine NEGATIVE NEGATIVE    Propoxyphene, Urine NEGATIVE NEGATIVE    Cannabinoid Scrn, Ur POSITIVE (A) NEGATIVE    Oxycodone Screen, Ur NEGATIVE NEGATIVE    Methamphetamine, Urine NEGATIVE NEGATIVE    Tricyclic Antidepressants, Urine NEGATIVE NEGATIVE    MDMA, Urine NOT REPORTED NEGATIVE    Buprenorphine Urine NEGATIVE NEGATIVE    Test Information       The following threshold concentrations are established for the drug assays:   Salicylate   Result Value Ref Range    Salicylate Lvl <1 (L) 3 - 10 mg/dL   Acetaminophen Level   Result Value Ref Range    Acetaminophen Level <5 (L) 10 - 30 ug/mL   Ethanol   Result Value Ref Range    Ethanol <10 <10 mg/dL    Ethanol percent NOT REPORTED %   Microscopic Urinalysis   Result Value Ref Range    -          WBC, UA 0 TO 4 0 - 4 /HPF    RBC, UA 0 TO 4 0 - 4 /HPF    Casts UA NOT REPORTED 0 - 2 /LPF    Crystals UA NOT REPORTED None /HPF    Epithelial Cells UA 5 TO 10 0 - 5 /HPF    Renal Epithelial, Urine NOT REPORTED 0 /HPF    Bacteria, UA TRACE (A) None    Mucus, UA 3+ (A) None    Trichomonas, UA NOT REPORTED None    Amorphous, UA NOT REPORTED None    Other Observations UA NOT REPORTED NOT REQ.     Yeast, UA NOT REPORTED None   CBC   Result Value Ref Range    WBC 12.0 (H) 3.5 - 11.0 k/uL    RBC 4.47 (L) 4.5 - 5.9 m/uL    Hemoglobin 13.3 (L) 13.5 - 17.5 g/dL    Hematocrit 39.0 (L) 41 - 53 %    MCV 87.3 80 - 100 fL    MCH 29.7 26 - 34 pg    MCHC 34.1 31 - 37 g/dL    RDW 14.2 11.0 - 14.5 %    Platelets 636 643 - 400 k/uL    MPV 7.1 6.0 - 12.0 fL    NRBC Automated NOT REPORTED per 100 WBC   Basic Metabolic Panel w/ Reflex to MG   Result Value Ref Range    Glucose 110 (H) 70 - 99 mg/dL    BUN 13 6 - 20 mg/dL    CREATININE 0.93 0.70 - 1.20 mg/dL    Bun/Cre Ratio 14 9 - 20    Calcium 8.8 8.6 - 10.4 mg/dL    Sodium 142 135 - 144 mmol/L    Potassium 3.9 3.7 - 5.3 mmol/L    Chloride 107 98 - 107 mmol/L    CO2 22 20 - 31 mmol/L    Anion Gap 13 9 - 17 mmol/L    GFR Non-African American >60 >60 mL/min    GFR African American >60 >60 mL/min    GFR Comment          GFR Staging NOT REPORTED    Protime-INR   Result Value Ref Range    Protime 11.0 9.4 - 11.3 sec    INR 1.1    EKG 12 Lead   Result Value Ref Range    Ventricular Rate 88 BPM    Atrial Rate 88 BPM    P-R Interval 134 ms    QRS Duration 82 ms    Q-T Interval 388 ms    QTc Calculation (Bazett) 469 ms    P Axis 66 degrees    R Axis 61 degrees    T Axis 50 degrees       EMERGENCY DEPARTMENT COURSE:     The patient was given the following medications:  Orders Placed This Encounter   Medications    0.9 % sodium chloride bolus    ondansetron (ZOFRAN) injection 4 mg    LORazepam (ATIVAN) injection 1 mg    iopamidol (ISOVUE-370) 76 % injection 100 mL    0.9 % sodium chloride bolus    DISCONTD: brexpiprazole (REXULTI) tablet 2 mg    DISCONTD: vitamin D3 TABS 4,000 Units    DISCONTD: citalopram (CELEXA) tablet 40 mg    DISCONTD: dicyclomine (BENTYL) capsule 10 mg    DISCONTD: fluticasone (FLONASE) 50 MCG/ACT nasal spray 1 spray    DISCONTD: hydrOXYzine (ATARAX) tablet 50 mg    DISCONTD: mirtazapine (REMERON) tablet 30 mg    DISCONTD: pantoprazole (PROTONIX) tablet 20 mg    DISCONTD: prazosin (MINIPRESS) capsule 2 mg    DISCONTD: sucralfate (CARAFATE) tablet 1 g    DISCONTD: traZODone (DESYREL) tablet 100 mg    DISCONTD: sodium chloride flush 0.9 % injection 10 mL    DISCONTD: sodium chloride flush 0.9 % injection 10 mL    DISCONTD: potassium chloride (KLOR-CON M) extended release tablet 40 mEq    DISCONTD: potassium bicarb-citric acid (EFFER-K) effervescent tablet 40 mEq    DISCONTD: potassium chloride 10 mEq/100 mL IVPB (Peripheral Line)    DISCONTD: magnesium sulfate 1 g in dextrose 5% 100 mL IVPB    DISCONTD:

## 2019-08-07 NOTE — PLAN OF CARE
improve  Description  Ability to perform activities of daily living will improve  Outcome: Ongoing  Goal: Able to verbalize reality based thinking  Description  Able to verbalize reality based thinking  Outcome: Ongoing  Goal: Skin appearance normal  Description  Skin appearance normal  Outcome: Ongoing  Goal: Maintenance of adequate nutrition will improve  Description  Maintenance of adequate nutrition will improve  Outcome: Ongoing  Goal: Participates in care planning  Description  Participates in care planning  Outcome: Ongoing  Goal: Patient specific goal  Description  Patient specific goal  Outcome: Ongoing

## 2019-08-07 NOTE — FLOWSHEET NOTE
Writer had long talk w/ pt after pt returned from the bathroom. After the talk pt walked with writer to bridge to look out window and talk some more. Pt stated \"I am feeling better\". Writer walked w pt back to room. Pt in bed. Respirations easy. Bed alarm on. Informed pt that writer was going to check other patients and will return. Call light in reach.

## 2019-08-08 ENCOUNTER — HOSPITAL ENCOUNTER (EMERGENCY)
Age: 29
Discharge: HOME | End: 2019-08-08
Attending: EMERGENCY MEDICINE
Payer: MEDICARE

## 2019-08-08 ENCOUNTER — TELEPHONE (OUTPATIENT)
Dept: FAMILY MEDICINE CLINIC | Age: 29
End: 2019-08-08

## 2019-08-08 VITALS
BODY MASS INDEX: 28.7 KG/M2 | OXYGEN SATURATION: 97 % | WEIGHT: 200 LBS | RESPIRATION RATE: 24 BRPM | TEMPERATURE: 99.8 F | HEART RATE: 83 BPM | SYSTOLIC BLOOD PRESSURE: 119 MMHG | DIASTOLIC BLOOD PRESSURE: 71 MMHG

## 2019-08-08 DIAGNOSIS — F06.4 ANXIETY DISORDER DUE TO KNOWN PHYSIOLOGICAL CONDITION: ICD-10-CM

## 2019-08-08 DIAGNOSIS — R40.4 TRANSIENT ALTERATION OF AWARENESS: Primary | ICD-10-CM

## 2019-08-08 DIAGNOSIS — R56.9 SEIZURE-LIKE ACTIVITY (HCC): Primary | ICD-10-CM

## 2019-08-08 PROCEDURE — 6370000000 HC RX 637 (ALT 250 FOR IP): Performed by: EMERGENCY MEDICINE

## 2019-08-08 PROCEDURE — 99284 EMERGENCY DEPT VISIT MOD MDM: CPT

## 2019-08-08 RX ORDER — LORAZEPAM 1 MG/1
1 TABLET ORAL EVERY 6 HOURS PRN
Qty: 6 TABLET | Refills: 0 | Status: SHIPPED | OUTPATIENT
Start: 2019-08-08 | End: 2019-08-15

## 2019-08-08 RX ORDER — LORAZEPAM 0.5 MG/1
1 TABLET ORAL ONCE
Status: COMPLETED | OUTPATIENT
Start: 2019-08-08 | End: 2019-08-08

## 2019-08-08 RX ADMIN — LORAZEPAM 0.5 MG: 0.5 TABLET ORAL at 09:58

## 2019-08-08 ASSESSMENT — ENCOUNTER SYMPTOMS
ABDOMINAL PAIN: 0
VOMITING: 0

## 2019-08-08 ASSESSMENT — PAIN DESCRIPTION - LOCATION: LOCATION: ABDOMEN

## 2019-08-08 ASSESSMENT — PAIN SCALES - GENERAL: PAINLEVEL_OUTOF10: 7

## 2019-08-08 NOTE — ED PROVIDER NOTES
[facility] ED  [address]  [place] [address]  Phone: [phone]        [address] [place] [address] ED  eMERGENCY dEPARTMENT eNCOUnter      Pt Name: Stepan Roy  MRN: [id number]  Vladislavgfricky 1990  Date of evaluation: 8/8/2019  Provider: Severo Flatness, [facility]       Chief Complaint   Patient presents with    Hallucinations     Auditory and visual         HISTORY OF PRESENT ILLNESS   (Location/Symptom, Timing/Onset,Context/Setting, Quality, Duration, Modifying Factors, Severity)  Note limiting factors. Stepan Roy is a 30 y.o. male who presents to the emergency department for evaluation of hallucinations. He states he is hearing voices and seeing people who are dead, particularly his father. Patient has a fairly extensive psychiatric history, multiple emergency department visits, multiple psychiatric facility admissions. Patient had been admitted to this hospital 2 days ago and discharged yesterday for abdominal pain. During this time, they did try to place him in a psychiatric facility but were unsuccessful. Currently, the patient seems more anxious than anything. He is complaining of feeling anxious. He states that seeing these images of his father got him very upset. Patient states he is hungry because he did not eat anything for 3 days    Did not take any medicine for anxiety. Nursing Notes were reviewed. REVIEW OF SYSTEMS    (2-9systems for level 4, 10 or more for level 5)     Review of Systems   Constitutional: Negative for fever. Cardiovascular: Negative for chest pain. Gastrointestinal: Negative for abdominal pain and vomiting. Skin: Negative for rash. Psychiatric/Behavioral: The patient is nervous/anxious. Except asnoted above the remainder of the review of systems was reviewed and negative.        PAST MEDICAL HISTORY     Past Medical History:   Diagnosis Date    Acid reflux     Anxiety     Appendicitis     when 11years old

## 2019-08-08 NOTE — DISCHARGE SUMMARY
(brexpiprazole) 2 mg p.o.  daily; carbamazepine (Tegretol) 100 mg p.o. q.12 hours; Celexa  (citalopram) 40 mg p.o. daily; Bentyl (dicyclomine) 10 mg p.o. 4 times a  day before meals and at bedtime; Flonase nasal spray 1 spray each  nostril daily; Remeron (mirtazapine) 30 mg p.o. nightly; ondansetron  (Zofran) 4 mg p.o. q.8 hours p.r.n. nausea; pantoprazole (Protonix) 20  mg p.o. b.i.d.; prazosin (Minipress) 2 mg p.o. nightly; Phenergan  (promethazine) 25 mg p.o. q.6 hours p.r.n. nausea; Carafate (sucralfate)  1 gm 4 times daily; vitamin D2 once a week; vitamin D3 2000 units  capsule 2 capsules daily. Follow up with the patient's personal physician, Marli Yoder, [facility]. Follow up with Psychiatry outpatient  as scheduled. Any aspect of the patient's care not discussed in the chart and/or  dictation will be addressed and treated as an outpatient. The patient's medications have been reviewed including, but not limited  to, pre-hospital, hospital and discharge medications. The patient  and/or the patient's personal representatives were specifically advised  the only medications to be taken are those set forth in the discharge  orders and no other medications should be taken. Any prior medications  not on the discharge orders are specifically discontinued. The patient has been advised of the financial relationship and ownership  interests between [facility] physicians and  employees of [facility] and [facility].         Aiden Aldridge    D: 08/07/2019 17:57:07       T: 08/08/2019 0:00:00     [email or web address]  Job#: [id number]     Doc#: [id number]    CC:

## 2019-08-08 NOTE — TELEPHONE ENCOUNTER
I would not order that. He would need to see neurology for an evaluation. Sometimes it is done in the hospital if he is there for seizures. Otherwise it would be done as an outpatient.  Do they want a referral?

## 2019-08-18 ENCOUNTER — APPOINTMENT (OUTPATIENT)
Dept: GENERAL RADIOLOGY | Age: 29
End: 2019-08-18
Payer: MEDICARE

## 2019-08-18 ENCOUNTER — HOSPITAL ENCOUNTER (EMERGENCY)
Age: 29
Discharge: HOME | End: 2019-08-18
Attending: EMERGENCY MEDICINE
Payer: MEDICARE

## 2019-08-18 VITALS
RESPIRATION RATE: 16 BRPM | OXYGEN SATURATION: 99 % | SYSTOLIC BLOOD PRESSURE: 136 MMHG | DIASTOLIC BLOOD PRESSURE: 87 MMHG | HEART RATE: 93 BPM | BODY MASS INDEX: 28.7 KG/M2 | WEIGHT: 200 LBS | TEMPERATURE: 98.6 F

## 2019-08-18 DIAGNOSIS — F41.1 ANXIETY STATE: ICD-10-CM

## 2019-08-18 DIAGNOSIS — R11.10 VOMITING AND DIARRHEA: ICD-10-CM

## 2019-08-18 DIAGNOSIS — E86.0 DEHYDRATION: Primary | ICD-10-CM

## 2019-08-18 DIAGNOSIS — R19.7 VOMITING AND DIARRHEA: ICD-10-CM

## 2019-08-18 LAB
ABSOLUTE EOS #: 0 K/UL (ref 0–0.4)
ABSOLUTE IMMATURE GRANULOCYTE: ABNORMAL K/UL (ref 0–0.3)
ABSOLUTE LYMPH #: 3.1 K/UL (ref 1–4.8)
ABSOLUTE MONO #: 1.5 K/UL (ref 0.1–1.2)
ALBUMIN SERPL-MCNC: 5.2 G/DL (ref 3.5–5.2)
ALBUMIN/GLOBULIN RATIO: 1.6 (ref 1–2.5)
ALP BLD-CCNC: 88 U/L (ref 40–129)
ALT SERPL-CCNC: 22 U/L (ref 5–41)
AMYLASE: 68 U/L (ref 28–100)
ANION GAP SERPL CALCULATED.3IONS-SCNC: 21 MMOL/L (ref 9–17)
AST SERPL-CCNC: 12 U/L
BASOPHILS # BLD: 0 % (ref 0–2)
BASOPHILS ABSOLUTE: 0.1 K/UL (ref 0–0.2)
BILIRUB SERPL-MCNC: 1.3 MG/DL (ref 0.3–1.2)
BILIRUBIN DIRECT: 0.25 MG/DL
BILIRUBIN, INDIRECT: 1.05 MG/DL (ref 0–1)
BUN BLDV-MCNC: 18 MG/DL (ref 6–20)
BUN/CREAT BLD: 15 (ref 9–20)
CALCIUM SERPL-MCNC: 10.4 MG/DL (ref 8.6–10.4)
CHLORIDE BLD-SCNC: 97 MMOL/L (ref 98–107)
CO2: 17 MMOL/L (ref 20–31)
CREAT SERPL-MCNC: 1.18 MG/DL (ref 0.7–1.2)
DIFFERENTIAL TYPE: ABNORMAL
EOSINOPHILS RELATIVE PERCENT: 0 % (ref 1–8)
GFR AFRICAN AMERICAN: >60 ML/MIN
GFR NON-AFRICAN AMERICAN: >60 ML/MIN
GFR SERPL CREATININE-BSD FRML MDRD: ABNORMAL ML/MIN/{1.73_M2}
GFR SERPL CREATININE-BSD FRML MDRD: ABNORMAL ML/MIN/{1.73_M2}
GLOBULIN: 3.2 G/DL (ref 1.5–3.8)
GLUCOSE BLD-MCNC: 122 MG/DL (ref 70–99)
HCT VFR BLD CALC: 45.1 % (ref 41–53)
HEMOGLOBIN: 15.8 G/DL (ref 13.5–17.5)
IMMATURE GRANULOCYTES: ABNORMAL %
LACTIC ACID: 2.7 MMOL/L (ref 0.5–2.2)
LIPASE: 16 U/L (ref 13–60)
LYMPHOCYTES # BLD: 19 % (ref 15–43)
MCH RBC QN AUTO: 30.3 PG (ref 26–34)
MCHC RBC AUTO-ENTMCNC: 34.9 G/DL (ref 31–37)
MCV RBC AUTO: 86.6 FL (ref 80–100)
MONOCYTES # BLD: 9 % (ref 6–14)
NRBC AUTOMATED: ABNORMAL PER 100 WBC
PDW BLD-RTO: 14.3 % (ref 11–14.5)
PLATELET # BLD: 469 K/UL (ref 140–450)
PLATELET ESTIMATE: ABNORMAL
PMV BLD AUTO: 7.1 FL (ref 6–12)
POTASSIUM SERPL-SCNC: 3.4 MMOL/L (ref 3.7–5.3)
RBC # BLD: 5.21 M/UL (ref 4.5–5.9)
RBC # BLD: ABNORMAL 10*6/UL
SEG NEUTROPHILS: 72 % (ref 44–74)
SEGMENTED NEUTROPHILS ABSOLUTE COUNT: 11.5 K/UL (ref 1.8–7.7)
SODIUM BLD-SCNC: 135 MMOL/L (ref 135–144)
TOTAL PROTEIN: 8.4 G/DL (ref 6.4–8.3)
WBC # BLD: 16.2 K/UL (ref 3.5–11)
WBC # BLD: ABNORMAL 10*3/UL

## 2019-08-18 PROCEDURE — 99283 EMERGENCY DEPT VISIT LOW MDM: CPT

## 2019-08-18 PROCEDURE — 96365 THER/PROPH/DIAG IV INF INIT: CPT

## 2019-08-18 PROCEDURE — 83605 ASSAY OF LACTIC ACID: CPT

## 2019-08-18 PROCEDURE — 85025 COMPLETE CBC W/AUTO DIFF WBC: CPT

## 2019-08-18 PROCEDURE — 6360000002 HC RX W HCPCS: Performed by: EMERGENCY MEDICINE

## 2019-08-18 PROCEDURE — 2580000003 HC RX 258: Performed by: EMERGENCY MEDICINE

## 2019-08-18 PROCEDURE — 80076 HEPATIC FUNCTION PANEL: CPT

## 2019-08-18 PROCEDURE — 36415 COLL VENOUS BLD VENIPUNCTURE: CPT

## 2019-08-18 PROCEDURE — 82150 ASSAY OF AMYLASE: CPT

## 2019-08-18 PROCEDURE — 83690 ASSAY OF LIPASE: CPT

## 2019-08-18 PROCEDURE — 96375 TX/PRO/DX INJ NEW DRUG ADDON: CPT

## 2019-08-18 PROCEDURE — 74022 RADEX COMPL AQT ABD SERIES: CPT

## 2019-08-18 PROCEDURE — 80048 BASIC METABOLIC PNL TOTAL CA: CPT

## 2019-08-18 RX ORDER — 0.9 % SODIUM CHLORIDE 0.9 %
1000 INTRAVENOUS SOLUTION INTRAVENOUS ONCE
Status: COMPLETED | OUTPATIENT
Start: 2019-08-18 | End: 2019-08-18

## 2019-08-18 RX ORDER — PROMETHAZINE HYDROCHLORIDE 25 MG/1
25 TABLET ORAL EVERY 6 HOURS PRN
Qty: 10 TABLET | Refills: 0 | Status: SHIPPED | OUTPATIENT
Start: 2019-08-18 | End: 2019-08-19 | Stop reason: SDUPTHER

## 2019-08-18 RX ORDER — PROMETHAZINE HYDROCHLORIDE 25 MG/ML
12.5 INJECTION, SOLUTION INTRAMUSCULAR; INTRAVENOUS ONCE
Status: COMPLETED | OUTPATIENT
Start: 2019-08-18 | End: 2019-08-18

## 2019-08-18 RX ORDER — SODIUM CHLORIDE, SODIUM LACTATE, POTASSIUM CHLORIDE, AND CALCIUM CHLORIDE .6; .31; .03; .02 G/100ML; G/100ML; G/100ML; G/100ML
1000 INJECTION, SOLUTION INTRAVENOUS ONCE
Status: COMPLETED | OUTPATIENT
Start: 2019-08-18 | End: 2019-08-18

## 2019-08-18 RX ADMIN — SODIUM CHLORIDE 1000 ML: 9 INJECTION, SOLUTION INTRAVENOUS at 22:09

## 2019-08-18 RX ADMIN — SODIUM CHLORIDE, POTASSIUM CHLORIDE, SODIUM LACTATE AND CALCIUM CHLORIDE 1000 ML: 600; 310; 30; 20 INJECTION, SOLUTION INTRAVENOUS at 22:52

## 2019-08-18 RX ADMIN — PROMETHAZINE HYDROCHLORIDE 12.5 MG: 25 INJECTION INTRAMUSCULAR; INTRAVENOUS at 22:12

## 2019-08-18 ASSESSMENT — PAIN DESCRIPTION - PAIN TYPE: TYPE: ACUTE PAIN

## 2019-08-18 ASSESSMENT — PAIN DESCRIPTION - LOCATION: LOCATION: ABDOMEN

## 2019-08-18 ASSESSMENT — PAIN SCALES - GENERAL: PAINLEVEL_OUTOF10: 8

## 2019-08-18 ASSESSMENT — PAIN DESCRIPTION - DESCRIPTORS: DESCRIPTORS: CRAMPING

## 2019-08-19 ENCOUNTER — OFFICE VISIT (OUTPATIENT)
Dept: FAMILY MEDICINE CLINIC | Age: 29
End: 2019-08-19
Payer: MEDICARE

## 2019-08-19 VITALS
WEIGHT: 189 LBS | SYSTOLIC BLOOD PRESSURE: 122 MMHG | BODY MASS INDEX: 27.06 KG/M2 | HEART RATE: 93 BPM | HEIGHT: 70 IN | OXYGEN SATURATION: 98 % | DIASTOLIC BLOOD PRESSURE: 80 MMHG

## 2019-08-19 DIAGNOSIS — F41.1 GENERALIZED ANXIETY DISORDER WITH PANIC ATTACKS: Primary | ICD-10-CM

## 2019-08-19 DIAGNOSIS — F41.0 GENERALIZED ANXIETY DISORDER WITH PANIC ATTACKS: Primary | ICD-10-CM

## 2019-08-19 DIAGNOSIS — R56.9 SEIZURE-LIKE ACTIVITY (HCC): ICD-10-CM

## 2019-08-19 PROCEDURE — 99214 OFFICE O/P EST MOD 30 MIN: CPT | Performed by: FAMILY MEDICINE

## 2019-08-19 PROCEDURE — 1111F DSCHRG MED/CURRENT MED MERGE: CPT | Performed by: FAMILY MEDICINE

## 2019-08-19 ASSESSMENT — ENCOUNTER SYMPTOMS
SHORTNESS OF BREATH: 1
CHEST TIGHTNESS: 0
VOMITING: 1
ABDOMINAL PAIN: 1
WHEEZING: 0
NAUSEA: 1
DIARRHEA: 0
COUGH: 0
CONSTIPATION: 0

## 2019-08-19 NOTE — PROGRESS NOTES
mouth once a week Indications: 50,00 IU             fluticasone (FLONASE) 50 MCG/ACT nasal spray  1 spray by Each Nare route daily             hydrOXYzine (ATARAX) 50 MG tablet  Take 1 tablet by mouth 3 times daily as needed for Itching             mirtazapine (REMERON) 30 MG tablet  Take 1 tablet by mouth nightly             pantoprazole (PROTONIX) 20 MG tablet  TAKE 1 TABLET BY MOUTH TWICE DAILY             prazosin (MINIPRESS) 2 MG capsule  Take 1 capsule by mouth nightly             promethazine (PHENERGAN) 25 MG tablet  Take 1 tablet by mouth every 6 hours as needed for Nausea             sucralfate (CARAFATE) 1 GM tablet  Take 1 tablet by mouth 4 times daily             traZODone (DESYREL) 100 MG tablet  Take 1 tablet by mouth nightly as needed for Sleep                   Medications marked \"taking\" at this time  Outpatient Medications Marked as Taking for the 8/19/19 encounter (Office Visit) with Harinder Leung MD   Medication Sig Dispense Refill    hydrOXYzine (ATARAX) 50 MG tablet Take 1 tablet by mouth 3 times daily as needed for Itching (Patient taking differently: Take 50 mg by mouth daily ) 90 tablet 5    traZODone (DESYREL) 100 MG tablet Take 1 tablet by mouth nightly as needed for Sleep (Patient taking differently: Take 100 mg by mouth nightly ) 30 tablet 0    Cholecalciferol (VITAMIN D3) 2000 units CAPS Take 2 capsules by mouth daily 60 capsule 5    pantoprazole (PROTONIX) 20 MG tablet TAKE 1 TABLET BY MOUTH TWICE DAILY 60 tablet 5    Ergocalciferol (VITAMIN D2 PO) Take 1 capsule by mouth once a week Indications: 50,00 IU      prazosin (MINIPRESS) 2 MG capsule Take 1 capsule by mouth nightly (Patient taking differently: Take 2 mg by mouth nightly Indications: 1MG EVERY MORNING ) 30 capsule 0    fluticasone (FLONASE) 50 MCG/ACT nasal spray 1 spray by Each Nare route daily 1 Bottle 3    sucralfate (CARAFATE) 1 GM tablet Take 1 tablet by mouth 4 times daily 120 tablet 5    promethazine (PHENERGAN) 25 MG tablet Take 1 tablet by mouth every 6 hours as needed for Nausea 30 tablet 2    mirtazapine (REMERON) 30 MG tablet Take 1 tablet by mouth nightly 30 tablet 0        Medications patient taking as of now reconciled against medications ordered at time of hospital discharge: No    Chief Complaint   Patient presents with    Follow-Up from [facility]- dx reginaldo anxiety dc 8-7-19 - states he does have a psychatrist - and they have him on his meds - but they are not working- he seems not be working he got so bad he passed out on his face - feels he had a seizure-    Seizures     would like to eeg and maybe a referral to neurology       HPI Here today for a hospital follow up for his anxiety. He has been on many different medications for his anxiety and for his sleep. He has had a few episodes of syncope. There was one episode that looked like possible seizure like behavior. Mom is very concerned. He tends to take a lot of showers to help his anxiety. He can take up to 21 showers a day. He is supposed to see his psychiatrist soon. Mom gives him ativan as needed. Mom is giving him all of his medications. Mom is very worried because his behavior isn't normal. She can't figure out how to help him. He is not eating well because he is so nauseous and he frequently vomits. Mom can't go anywhere because she doesn't want to leave him by himself. He hasn't smoked marijuana in 2 days. Inpatient course: Discharge summary reviewed- see chart. Interval history/Current status: stable    Review of Systems   Constitutional: Positive for activity change, appetite change and fatigue. Negative for fever. Respiratory: Positive for shortness of breath (during panic attacks). Negative for cough, chest tightness and wheezing. Cardiovascular: Positive for palpitations (during panic attacks). Negative for chest pain and leg swelling. Gastrointestinal: Positive for abdominal pain, nausea and vomiting. Negative for constipation and diarrhea. Skin: Negative for rash. Neurological: Negative for syncope and headaches. Seizures: ?????? Psychiatric/Behavioral: Positive for agitation, dysphoric mood, sleep disturbance and suicidal ideas. Negative for self-injury. The patient is nervous/anxious. Vitals:    08/19/19 1442   BP: 122/80   Site: Right Upper Arm   Position: Sitting   Cuff Size: Medium Adult   Pulse: 93   SpO2: 98%   Weight: 189 lb (85.7 kg)   Height: 5' 10\" (1.778 m)     Body mass index is 27.12 kg/m². Wt Readings from Last 3 Encounters:   08/19/19 189 lb (85.7 kg)   08/18/19 200 lb (90.7 kg)   08/08/19 200 lb (90.7 kg)     BP Readings from Last 3 Encounters:   08/19/19 122/80   08/18/19 136/87   08/08/19 119/71       Physical Exam   Constitutional: He is oriented to person, place, and time. He appears well-developed and well-nourished. He appears lethargic. He is sleeping. No distress. Eyes: Conjunctivae are normal.   Neck: Normal range of motion. Neck supple. Cardiovascular: Normal rate, regular rhythm, normal heart sounds and intact distal pulses. No murmur heard. Pulmonary/Chest: Effort normal and breath sounds normal. No respiratory distress. He has no wheezes. He has no rales. Musculoskeletal: Normal range of motion. He exhibits no edema. Lymphadenopathy:     He has no cervical adenopathy. Neurological: He is oriented to person, place, and time. He appears lethargic. Skin: Skin is warm and dry. No rash noted. Psychiatric: His speech is normal. His mood appears anxious. He is withdrawn. Cognition and memory are normal. He expresses impulsivity. He is inattentive. Nursing note and vitals reviewed. Assessment/Plan:  1.  Generalized anxiety disorder with panic attacks  Stable; I encouraged him to keep his appointment with neurology and to follow up with me prn.    - [place] DISCHARGE MEDS RECONCILED W/ CURRENT OUTPATIENT MED LIST    2. Seizure-like activity ([address]  New; mom

## 2019-08-19 NOTE — ED PROVIDER NOTES
[id number] [facility]  eMERGENCY dEPARTMENT eNCOUnter      Pt Name: Maximus Chowdary  MRN: [id number]  [place] 1990  Date of evaluation: 8/18/2019      CHIEF COMPLAINT       Chief Complaint   Patient presents with    Panic Attack         HISTORY OF PRESENT ILLNESS      The patient presents to the emergency department with a panic attack. He says for 3 days he has been having nausea and diarrhea. He's had some vomiting as well. He says his pain is in the lower abdomen. He has had \"acidic\" stools. He denies blood in his stool or emesis however. The patient does have a history of an anxiety disorder. He denies fever but has felt hot. He's had a prior appendectomy in the past.  He has had no recent surgeries. Nothing makes his symptoms better or worse otherwise. He says his mother says he passed out at home while he was having a panic attack. REVIEW OF SYSTEMS       All systems reviewed and negative unless noted in HPI. The patient denies fever or constitutional symptoms. Denies vision change. Denies any sore throat or rhinorrhea. Denies any neck pain or stiffness. Denies chest pain or shortness of breath. Recent abdominal pain, vomiting and diarrhea. Denies any dysuria. Denies urinary frequency or hematuria. Denies musculoskeletal injury or pain. Denies any weakness, numbness or focal neurologic deficit. Recent LOC. Denies any skin rash or edema. Recent anxiety. No easy bruising or bleeding. Denies any polyuria, polydypsia or history of immunocompromise. PAST MEDICAL HISTORY    has a past medical history of Acid reflux, Anxiety, Appendicitis, Diverticulitis, History of acute renal failure, History of stomach ulcers, Irritable bowel syndrome, and Ulcer. SURGICAL HISTORY      has a past surgical history that includes Appendectomy; Upper gastrointestinal endoscopy; Colonoscopy; and Gruver tooth extraction.     CURRENT MEDICATIONS       Previous Medications

## 2019-08-26 ENCOUNTER — OFFICE VISIT (OUTPATIENT)
Dept: NEUROLOGY | Age: 29
End: 2019-08-26
Payer: MEDICARE

## 2019-08-26 VITALS
WEIGHT: 195.2 LBS | DIASTOLIC BLOOD PRESSURE: 76 MMHG | HEIGHT: 70 IN | BODY MASS INDEX: 27.94 KG/M2 | SYSTOLIC BLOOD PRESSURE: 122 MMHG | HEART RATE: 88 BPM | RESPIRATION RATE: 14 BRPM

## 2019-08-26 DIAGNOSIS — F34.1 DYSTHYMIA: ICD-10-CM

## 2019-08-26 DIAGNOSIS — F33.2 SEVERE EPISODE OF RECURRENT MAJOR DEPRESSIVE DISORDER, WITHOUT PSYCHOTIC FEATURES (HCC): ICD-10-CM

## 2019-08-26 DIAGNOSIS — K58.2 IRRITABLE BOWEL SYNDROME WITH BOTH CONSTIPATION AND DIARRHEA: ICD-10-CM

## 2019-08-26 DIAGNOSIS — F19.10 DRUG ABUSE (HCC): ICD-10-CM

## 2019-08-26 DIAGNOSIS — R45.851 SUICIDAL IDEATION: ICD-10-CM

## 2019-08-26 DIAGNOSIS — F41.9 ANXIETY: ICD-10-CM

## 2019-08-26 DIAGNOSIS — F43.10 POST TRAUMATIC STRESS DISORDER: ICD-10-CM

## 2019-08-26 DIAGNOSIS — F33.3 MDD (MAJOR DEPRESSIVE DISORDER), RECURRENT, SEVERE, WITH PSYCHOSIS (HCC): Chronic | ICD-10-CM

## 2019-08-26 DIAGNOSIS — F32.2 SEVERE MAJOR DEPRESSION (HCC): ICD-10-CM

## 2019-08-26 DIAGNOSIS — K52.9 CHRONIC DIARRHEA: ICD-10-CM

## 2019-08-26 DIAGNOSIS — R56.9 SEIZURE-LIKE ACTIVITY (HCC): Primary | ICD-10-CM

## 2019-08-26 DIAGNOSIS — F41.1 GENERALIZED ANXIETY DISORDER WITH PANIC ATTACKS: ICD-10-CM

## 2019-08-26 DIAGNOSIS — F41.0 GENERALIZED ANXIETY DISORDER WITH PANIC ATTACKS: ICD-10-CM

## 2019-08-26 PROCEDURE — G8427 DOCREV CUR MEDS BY ELIG CLIN: HCPCS | Performed by: PSYCHIATRY & NEUROLOGY

## 2019-08-26 PROCEDURE — G8417 CALC BMI ABV UP PARAM F/U: HCPCS | Performed by: PSYCHIATRY & NEUROLOGY

## 2019-08-26 PROCEDURE — 99245 OFF/OP CONSLTJ NEW/EST HI 55: CPT | Performed by: PSYCHIATRY & NEUROLOGY

## 2019-08-26 RX ORDER — LORAZEPAM 1 MG/1
1 TABLET ORAL EVERY 6 HOURS PRN
Status: ON HOLD | COMMUNITY
End: 2020-11-18 | Stop reason: HOSPADM

## 2019-08-26 ASSESSMENT — ENCOUNTER SYMPTOMS
VOICE CHANGE: 0
SWOLLEN GLANDS: 0
EYE DISCHARGE: 0
EYE ITCHING: 0
COUGH: 0
EYE PAIN: 0
VISUAL CHANGE: 0
SORE THROAT: 0
PHOTOPHOBIA: 0
CONSTIPATION: 0
ABDOMINAL DISTENTION: 0
WHEEZING: 0
BACK PAIN: 0
DIARRHEA: 0
BOWEL INCONTINENCE: 0
CHANGE IN BOWEL HABIT: 0
SINUS PRESSURE: 0
APNEA: 0
BLOOD IN STOOL: 0
FACIAL SWELLING: 0
CHOKING: 0
VOMITING: 0
TROUBLE SWALLOWING: 0
SHORTNESS OF BREATH: 0
ABDOMINAL PAIN: 0
NAUSEA: 0
CHEST TIGHTNESS: 0
EYE REDNESS: 0
COLOR CHANGE: 0

## 2019-08-26 NOTE — PROGRESS NOTES
[facility]  Neurology  [address]:[phone]   Fax: [phone]    SUBJECTIVE:     PATIENT ID:  Melissa Mitchell is a  RIGHT     HANDED 30 y.o. male. Seizures   This is a new problem. Episode onset: AUGUST 2019   The problem has been unchanged. Pertinent negatives include no abdominal pain, anorexia, arthralgias, change in bowel habit, chest pain, chills, congestion, coughing, diaphoresis, fatigue, fever, headaches, joint swelling, myalgias, nausea, neck pain, numbness, rash, sore throat, swollen glands, urinary symptoms, vertigo, visual change, vomiting or weakness. Nothing aggravates the symptoms. He has tried nothing for the symptoms. The treatment provided no relief. Neurologic Problem   The patient's primary symptoms include memory loss and syncope. The patient's pertinent negatives include no altered mental status, clumsiness, focal sensory loss, focal weakness, loss of balance, near-syncope, slurred speech, visual change or weakness. This is a new problem. Episode onset: AUGUST 2019. The neurological problem developed suddenly. The problem is unchanged. There was no focality noted. Associated symptoms include confusion. Pertinent negatives include no abdominal pain, auditory change, aura, back pain, bladder incontinence, bowel incontinence, chest pain, diaphoresis, dizziness, fatigue, fever, headaches, light-headedness, nausea, neck pain, palpitations, shortness of breath, vertigo or vomiting. Past treatments include nothing. The treatment provided no relief. His past medical history is significant for mood changes and seizures. There is no history of a bleeding disorder, a clotting disorder, a CVA, dementia, head trauma or liver disease. History obtained from  The patient    And   HIS  MOTHER         and other  available   medical  records   were  Also  reviewed.       The  Duration,  Quality,  Severity,  Location,  Timing, Context,  Modifying  Factors   Of   The   Chief   Complaint   And  Present  Illness       Was   Reviewed   In   Chronological   Manner. PATIENT'S  MAIN  CONCERNS INCLUDE :                       1)    H/O     GTC  SEIZURE  ACTIVITY      WITNESSED    BY                                 HIS  MOTHER     AT   HOME    IN    EARLY   AUGUST. 2019  . H/O     ANXIETY,     VISUAL  HALLUCINATIONS                                 PRIOR  TO  THE  EPISODE        AND   POST  ICTAL                                  FATIGUE  AND  MEMORY  LOSS                                    2)    H/O    SYNCOPE    IN  MID  AUGUST 2019                            AND  PATIENT  FOUND  ON                             BATH  ROOM  FLOOR    BY   HIS  MOTHER                             3)    H/O    CHRONIC   ANXIETY,    DEPRESSION ,   PTSD                               -   BEING  FOLLOWED   BY   HIS   MENTAL  HEALTH  PROFESSIONALS                                -   ON     PRAZOSIN,   REMERON,   TRAZODONE ,   ATIVAN                           4)     H/O    CHRONIC   SLEEP  DIFFICULTIES                                             AND  NIGHT  MONTOYA                                5)    H/O     CHRONIC  SMOKING                    PATIENT  AWARE  OF  RISKS  AND                 SIDE  EFFECTS  OF   SMOKING   DISCUSSED. PATIENT   ADVISED   AND  COUNSELED    TO   QUIT  SMOKING. 6)     H/O    ON   MEDICAL  MARIJUANA       SINCE    FEB. 2019                         7)     NO      H/O  ALCOHOL  USAGE.                                     NO     H/O    ILLEGAL   SUBSTANCE  USAGE   CURRENTLY                            8)     H/O      BLACK  OUT  EPISODES                                         AND   MEMORY   PROBLEMS                                 9)     H/O   NORMAL  BIRTH  AND  DEVELOPMENT                                   NO     H/O    FEBRILE  SEIZURES coordination   problems  absent           Gait problems     absent            Headaches      absent              Migraines           absent           Memory problemspresent              Confusion        absent            Paresthesia   numbness          absent           Seizures  And  Starring  Episodes           present           Syncope,  Near  syncopal episodes         present           Speech   problems           absent             Swallowing   Problems      absent            Dizziness,  Light headedness           absent              Vertigo        absent             Generalized   Weakness    absent              focal  Weakness     absent             Tremors         absent              Sleep  Problems     Present               History  Of   Recent  Head  Injury     absent             History  Of   Recent  TIA     absent             History  Of   Recent    Stroke     absent             Neck  Pain   and   Neck muscle  Spasms  absent               Radiating  down   And   Weakness           absent            Lower back   Pain  And     Spasms  absent              Radiating    Down   And   Weakness          absent                H/OFALLS        absent               History  Of   Visual  Symptoms    absent                  Associated   Diplopia       absent                                               Also   Additional   Symptoms   Present    As  Documented    In   The   detailed                  Review  Of  Systems   And    Please   Refer   To    Them for   Additional    Information. Any components  That are either  Unobtainable  Or  Limited  In   HPI, ROS  And/or PFSH   Are                   Due   ToPatient's  Medical  Problems,  Clinical  Condition   and/or lack of                                other    Alternate   resources.           RECORDS   REVIEWED:    historical medical records       INFORMATION   REVIEWED:     MEDICAL   HISTORY,SURGICAL   HISTORY,     MEDICATIONS   LIST,   ALLERGIES AND taking: Reported on 2019) 120 capsule 5     No current facility-administered medications for this visit. Allergies   Allergen Reactions    Haldol [Haloperidol]      EPS    Zoloft [Sertraline Hcl] Other (See Comments)     nightmares         Family History   Problem Relation Age of Onset    Kidney Disease Mother     Other Mother         Gastroesophageal reflux disease    Allergies Brother     Cancer Maternal Uncle         Great-uncle    Crohn's Disease Maternal Uncle     Other Father         drug overdose         Social History     Socioeconomic History    Marital status: Single     Spouse name: Not on file    Number of children: Not on file    Years of education: Not on file    Highest education level: Not on file   Occupational History     Employer: [employer]   Social Needs    Financial resource strain: Not on file    Food insecurity:     Worry: Not on file     Inability: Not on file    Transportation needs:     Medical: Not on file     Non-medical: Not on file   Tobacco Use    Smoking status: Former Smoker     Packs/day: 0.50     Types: Cigarettes     Last attempt to quit: 2019     Years since quittin.2    Smokeless tobacco: Never Used    Tobacco comment: syant rrt 2019   Substance and Sexual Activity    Alcohol use:  Yes     Alcohol/week: 2.0 standard drinks     Types: 2 Cans of beer per week     Comment: stopped drinking 7 months ago - 2018    Drug use: Yes     Frequency: 21.0 times per week     Types: Marijuana     Comment: 19 reports using marijuana 3 times a day, states has a medical card    Sexual activity: Not Currently     Partners: Female   Lifestyle    Physical activity:     Days per week: Not on file     Minutes per session: Not on file    Stress: Not on file   Relationships    Social connections:     Talks on phone: Not on file     Gets together: Not on file     Attends Zoroastrian service: Not on file     Active member of club or organization: Not on without raccoon's eyes and without Kern's sign. Right Ear: External ear normal.   Left Ear: External ear normal.   Nose: Nose normal.   Mouth/Throat: Oropharynx is clear and moist.   Eyes: Conjunctivae are normal.   Neck: Normal range of motion. Neck supple. No muscular tenderness present. Carotid bruit is not present. No neck rigidity. No tracheal deviation and normal range of motion present. No Brudzinski's sign and no Kernig's sign noted. No thyroid mass and no thyromegaly present. Cardiovascular: Normal rate and regular rhythm. Pulmonary/Chest: Effort normal.   Musculoskeletal: Normal range of motion. He exhibits no edema or tenderness. Skin: Skin is warm. No rash noted. No cyanosis or erythema. No pallor. Nails show no clubbing. Psychiatric: His mood appears anxious. His affect is blunt. His affect is not angry, not labile and not inappropriate. He is slowed. He is not agitated, not aggressive, not hyperactive, not withdrawn, not actively hallucinating and not combative. Thought content is not paranoid and not delusional. Cognition and memory are impaired. He does not express impulsivity or inappropriate judgment. He exhibits a depressed mood. He expresses no homicidal and no suicidal ideation. He expresses no suicidal plans and no homicidal plans. He exhibits normal recent memory and normal remote memory. He is attentive. Neurologic Exam      NEUROLOGICALEXAMINATION :       A) MENTAL STATUS:                   Alert and  oriented  To time, place  And  Person. No Aphasia. No  Dysarthria. Able   To  Follow    SIMPLE     commands   without   Any  Difficulty. No right  To left confusion. Normal  Speech  And language function.                    Insight and  Judgment ,Fund  Of  Knowledge   within normal limits                Recent  And  Remote memory  DECREASED                    Attention &  Concentration are      DECREASED New  Neurological   And  Any neurological  Concerns. *  If  The  Patient remains  Neurologically  Stable   Return   To  [facility] Neurology Department   IN    1-2       MONTHS  TIME   FOR  FURTHER              FOLLOW UP.                       *   The  Neurological   Findings,  Possible  Diagnosis,  Differential diagnoses                    And  Options  For    Further   Investigations                   And  management   Are  Discussed  Comprehensively. Medications   And  Prescription   Risks  And  Side effects  Are   Also  Discussed. *  If   There is  Any  Significant  Worsening   Of  Current  Symptoms  And  Or                  If patient  Develops   Any additional  New  NeurologicalSymptoms                  Or  Significant  Concerns   Should  Call  911 or  Go  To  Emergency  Department  For  Further  Immediate  Evaluation. The   Above  Were  Reviewed  With  Patient    AND   HIS  MOTHER     and                       questions  Answered  In  Detail. More   Than  50% of face  To face Time   Was  Spent  On  Counseling                    And   Coordination  Of  Care   Of   Patient's  multiple   Neurological  Problems                         And   Comorbid  Medical   Conditions. Electronically signed by Ana Paula Monson MD    Board Certified in  Neurology &  In  [facility] [employer] [facility] and Neurology ([employer])      DISCLAIMER:   Although every effort was made to ensure the accuracy of this  electronictranscription, some errors in transcription may have occurred. GENERAL PATIENT INSTRUCTIONS:      A Healthy Lifestyle: Care Instructions   Your Care Instructions   A healthy lifestyle can help you feel good, stay at ahealthy weight, and have plenty of energy for both work and play. A healthy lifestyle is something you can share with your whole family.    A

## 2019-09-12 ENCOUNTER — HOSPITAL ENCOUNTER (OUTPATIENT)
Dept: MRI IMAGING | Age: 29
Discharge: HOME | End: 2019-09-14
Payer: MEDICARE

## 2019-09-12 DIAGNOSIS — F41.0 GENERALIZED ANXIETY DISORDER WITH PANIC ATTACKS: ICD-10-CM

## 2019-09-12 DIAGNOSIS — F43.10 POST TRAUMATIC STRESS DISORDER: ICD-10-CM

## 2019-09-12 DIAGNOSIS — F41.1 GENERALIZED ANXIETY DISORDER WITH PANIC ATTACKS: ICD-10-CM

## 2019-09-12 DIAGNOSIS — F19.10 DRUG ABUSE (HCC): ICD-10-CM

## 2019-09-12 DIAGNOSIS — R56.9 SEIZURE-LIKE ACTIVITY (HCC): ICD-10-CM

## 2019-09-12 PROCEDURE — 70553 MRI BRAIN STEM W/O & W/DYE: CPT

## 2019-09-12 PROCEDURE — 6360000004 HC RX CONTRAST MEDICATION: Performed by: PSYCHIATRY & NEUROLOGY

## 2019-09-12 PROCEDURE — A9579 GAD-BASE MR CONTRAST NOS,1ML: HCPCS | Performed by: PSYCHIATRY & NEUROLOGY

## 2019-09-12 RX ADMIN — GADOTERIDOL 15 ML: 279.3 INJECTION, SOLUTION INTRAVENOUS at 16:27

## 2019-09-22 ENCOUNTER — HOSPITAL ENCOUNTER (EMERGENCY)
Age: 29
Discharge: HOME | End: 2019-09-22
Attending: EMERGENCY MEDICINE
Payer: MEDICARE

## 2019-09-22 VITALS
OXYGEN SATURATION: 98 % | TEMPERATURE: 100.1 F | SYSTOLIC BLOOD PRESSURE: 121 MMHG | BODY MASS INDEX: 27.98 KG/M2 | RESPIRATION RATE: 16 BRPM | HEART RATE: 60 BPM | DIASTOLIC BLOOD PRESSURE: 77 MMHG | WEIGHT: 195 LBS

## 2019-09-22 DIAGNOSIS — F41.0 PANIC ATTACK: Primary | ICD-10-CM

## 2019-09-22 LAB
ABSOLUTE EOS #: 0 K/UL (ref 0–0.4)
ABSOLUTE IMMATURE GRANULOCYTE: ABNORMAL K/UL (ref 0–0.3)
ABSOLUTE LYMPH #: 3.6 K/UL (ref 1–4.8)
ABSOLUTE MONO #: 1.2 K/UL (ref 0.1–1.2)
ANION GAP SERPL CALCULATED.3IONS-SCNC: 20 MMOL/L (ref 9–17)
BASOPHILS # BLD: 1 % (ref 0–2)
BASOPHILS ABSOLUTE: 0.1 K/UL (ref 0–0.2)
BUN BLDV-MCNC: 12 MG/DL (ref 6–20)
BUN/CREAT BLD: 13 (ref 9–20)
CALCIUM SERPL-MCNC: 9.8 MG/DL (ref 8.6–10.4)
CHLORIDE BLD-SCNC: 99 MMOL/L (ref 98–107)
CO2: 18 MMOL/L (ref 20–31)
CREAT SERPL-MCNC: 0.9 MG/DL (ref 0.7–1.2)
DIFFERENTIAL TYPE: ABNORMAL
EOSINOPHILS RELATIVE PERCENT: 0 % (ref 1–8)
GFR AFRICAN AMERICAN: >60 ML/MIN
GFR NON-AFRICAN AMERICAN: >60 ML/MIN
GFR SERPL CREATININE-BSD FRML MDRD: ABNORMAL ML/MIN/{1.73_M2}
GFR SERPL CREATININE-BSD FRML MDRD: ABNORMAL ML/MIN/{1.73_M2}
GLUCOSE BLD-MCNC: 111 MG/DL (ref 70–99)
HCT VFR BLD CALC: 41 % (ref 41–53)
HEMOGLOBIN: 14.3 G/DL (ref 13.5–17.5)
IMMATURE GRANULOCYTES: ABNORMAL %
LYMPHOCYTES # BLD: 23 % (ref 15–43)
MCH RBC QN AUTO: 30.3 PG (ref 26–34)
MCHC RBC AUTO-ENTMCNC: 34.9 G/DL (ref 31–37)
MCV RBC AUTO: 87 FL (ref 80–100)
MONOCYTES # BLD: 8 % (ref 6–14)
NRBC AUTOMATED: ABNORMAL PER 100 WBC
PDW BLD-RTO: 14.2 % (ref 11–14.5)
PLATELET # BLD: 464 K/UL (ref 140–450)
PLATELET ESTIMATE: ABNORMAL
PMV BLD AUTO: 7.1 FL (ref 6–12)
POTASSIUM SERPL-SCNC: 3 MMOL/L (ref 3.7–5.3)
RBC # BLD: 4.72 M/UL (ref 4.5–5.9)
RBC # BLD: ABNORMAL 10*6/UL
SEG NEUTROPHILS: 68 % (ref 44–74)
SEGMENTED NEUTROPHILS ABSOLUTE COUNT: 10.5 K/UL (ref 1.8–7.7)
SODIUM BLD-SCNC: 137 MMOL/L (ref 135–144)
THYROXINE, FREE: 1.7 NG/DL (ref 0.93–1.7)
TROPONIN INTERP: NORMAL
TROPONIN T: NORMAL NG/ML
TROPONIN, HIGH SENSITIVITY: <6 NG/L (ref 0–22)
TSH SERPL DL<=0.05 MIU/L-ACNC: 0.19 MIU/L (ref 0.3–5)
WBC # BLD: 15.4 K/UL (ref 3.5–11)
WBC # BLD: ABNORMAL 10*3/UL

## 2019-09-22 PROCEDURE — 84439 ASSAY OF FREE THYROXINE: CPT

## 2019-09-22 PROCEDURE — 84484 ASSAY OF TROPONIN QUANT: CPT

## 2019-09-22 PROCEDURE — 80048 BASIC METABOLIC PNL TOTAL CA: CPT

## 2019-09-22 PROCEDURE — 85025 COMPLETE CBC W/AUTO DIFF WBC: CPT

## 2019-09-22 PROCEDURE — 84443 ASSAY THYROID STIM HORMONE: CPT

## 2019-09-22 PROCEDURE — 93005 ELECTROCARDIOGRAM TRACING: CPT | Performed by: EMERGENCY MEDICINE

## 2019-09-22 PROCEDURE — 2580000003 HC RX 258: Performed by: EMERGENCY MEDICINE

## 2019-09-22 PROCEDURE — 6360000002 HC RX W HCPCS: Performed by: EMERGENCY MEDICINE

## 2019-09-22 PROCEDURE — 99284 EMERGENCY DEPT VISIT MOD MDM: CPT

## 2019-09-22 RX ORDER — SODIUM CHLORIDE, SODIUM LACTATE, POTASSIUM CHLORIDE, AND CALCIUM CHLORIDE .6; .31; .03; .02 G/100ML; G/100ML; G/100ML; G/100ML
1000 INJECTION, SOLUTION INTRAVENOUS ONCE
Status: COMPLETED | OUTPATIENT
Start: 2019-09-22 | End: 2019-09-22

## 2019-09-22 RX ORDER — HYDROXYZINE HYDROCHLORIDE 50 MG/ML
50 INJECTION, SOLUTION INTRAMUSCULAR EVERY 6 HOURS PRN
Status: DISCONTINUED | OUTPATIENT
Start: 2019-09-22 | End: 2019-09-22 | Stop reason: HOSPADM

## 2019-09-22 RX ADMIN — SODIUM CHLORIDE, POTASSIUM CHLORIDE, SODIUM LACTATE AND CALCIUM CHLORIDE 1000 ML: 600; 310; 30; 20 INJECTION, SOLUTION INTRAVENOUS at 15:59

## 2019-09-22 RX ADMIN — HYDROXYZINE HYDROCHLORIDE 50 MG: 50 INJECTION, SOLUTION INTRAMUSCULAR at 16:00

## 2019-09-22 NOTE — ED PROVIDER NOTES
needed for Itching    LORAZEPAM (ATIVAN) 1 MG TABLET    Take 1 mg by mouth every 6 hours as needed for Anxiety. MIRTAZAPINE (REMERON) 30 MG TABLET    Take 1 tablet by mouth nightly    PANTOPRAZOLE (PROTONIX) 20 MG TABLET    TAKE 1 TABLET BY MOUTH TWICE DAILY    PRAZOSIN (MINIPRESS) 2 MG CAPSULE    Take 1 capsule by mouth nightly    PROMETHAZINE (PHENERGAN) 25 MG TABLET    Take 1 tablet by mouth every 6 hours as needed for Nausea    SUCRALFATE (CARAFATE) 1 GM TABLET    Take 1 tablet by mouth 4 times daily    TRAZODONE (DESYREL) 100 MG TABLET    Take 1 tablet by mouth nightly as needed for Sleep       ALLERGIES     is allergic to haldol [haloperidol] and zoloft [sertraline hcl]. FAMILY HISTORY     He indicated that his mother is alive. He indicated that his father is . He indicated that his sister is alive. He indicated that both of his brothers are alive. family history includes Allergies in his brother; Cancer in his maternal uncle; Crohn's Disease in his maternal uncle; Kidney Disease in his mother; Other in his father and mother. SOCIAL HISTORY      reports that he quit smoking about 4 months ago. His smoking use included cigarettes. He smoked 0.50 packs per day. He has never used smokeless tobacco. He reports that he drinks about 2.0 standard drinks of alcohol per week. He reports that he has current or past drug history. Drug: Marijuana. Frequency: 21.00 times per week. PHYSICAL EXAM    (up to 7 for level 4, 8 or more for level 5)   INITIAL VITALS:  weight is 195 lb (88.5 kg). His tympanic temperature is 100.1 °F (37.8 °C). His blood pressure is 146/97 (abnormal) and his pulse is 96. His respiration is 20 and oxygen saturation is 97%. Physical Exam   Constitutional: He appears well-developed and well-nourished. HENT:   Head: Normocephalic and atraumatic. Eyes: Conjunctivae are normal.   Neck: Normal range of motion. Neck supple. No JVD present. No tracheal deviation present.  No 4.5 - 5.9 m/uL    Hemoglobin 14.3 13.5 - 17.5 g/dL    Hematocrit 41.0 41 - 53 %    MCV 87.0 80 - 100 fL    MCH 30.3 26 - 34 pg    MCHC 34.9 31 - 37 g/dL    RDW 14.2 11.0 - 14.5 %    Platelets 365 (H) 897 - 450 k/uL    MPV 7.1 6.0 - 12.0 fL    NRBC Automated NOT REPORTED per 100 WBC    Differential Type NOT REPORTED     Immature Granulocytes NOT REPORTED 0 %    Absolute Immature Granulocyte NOT REPORTED 0.00 - 0.30 k/uL    WBC Morphology NOT REPORTED     RBC Morphology NOT REPORTED     Platelet Estimate NOT REPORTED     Seg Neutrophils 68 44 - 74 %    Lymphocytes 23 15 - 43 %    Monocytes 8 6 - 14 %    Eosinophils % 0 (L) 1 - 8 %    Basophils 1 0 - 2 %    Segs Absolute 10.50 (H) 1.8 - 7.7 k/uL    Absolute Lymph # 3.60 1.0 - 4.8 k/uL    Absolute Mono # 1.20 0.1 - 1.2 k/uL    Absolute Eos # 0.00 0.0 - 0.4 k/uL    Basophils Absolute 0.10 0.0 - 0.2 k/uL   Troponin   Result Value Ref Range    Troponin, High Sensitivity <6 0 - 22 ng/L    Troponin T NOT REPORTED <0.03 ng/mL    Troponin Interp NOT REPORTED    TSH WITHOUT REFLEX   Result Value Ref Range    TSH 0.19 (L) 0.30 - 5.00 mIU/L   T4, FREE   Result Value Ref Range    Thyroxine, Free 1.70 0.93 - 1.70 ng/dL   EKG 12 Lead   Result Value Ref Range    Ventricular Rate 69 BPM    Atrial Rate 69 BPM    P-R Interval 128 ms    QRS Duration 92 ms    Q-T Interval 410 ms    QTc Calculation (Bazett) 439 ms    P Axis 39 degrees    R Axis 42 degrees    T Axis 26 degrees           EMERGENCY DEPARTMENT COURSE:   Vitals:    Vitals:    09/22/19 1514   BP: (!) 146/97   Pulse: 96   Resp: 20   Temp: 100.1 °F (37.8 °C)   TempSrc: Tympanic   SpO2: 97%   Weight: 195 lb (88.5 kg)     -------------------------  BP: (!) 146/97, Temp: 100.1 °F (37.8 °C), Pulse: 96, Resp: 20      RE-EVALUATION:  The patient on repeat evaluation is feeling much better he is back to his baseline status he has no thoughts of hurting himself or others labs showed that he did have a thyroid level that was up in the

## 2019-09-23 ENCOUNTER — HOSPITAL ENCOUNTER (EMERGENCY)
Age: 29
Discharge: HOME | End: 2019-09-23
Attending: EMERGENCY MEDICINE
Payer: MEDICARE

## 2019-09-23 VITALS
OXYGEN SATURATION: 98 % | SYSTOLIC BLOOD PRESSURE: 128 MMHG | DIASTOLIC BLOOD PRESSURE: 73 MMHG | RESPIRATION RATE: 14 BRPM | HEART RATE: 67 BPM | HEIGHT: 70 IN | BODY MASS INDEX: 27.2 KG/M2 | TEMPERATURE: 99.4 F | WEIGHT: 190 LBS

## 2019-09-23 DIAGNOSIS — F32.A DEPRESSIVE DISORDER: Primary | ICD-10-CM

## 2019-09-23 LAB
ABSOLUTE EOS #: 0 K/UL (ref 0–0.4)
ABSOLUTE IMMATURE GRANULOCYTE: ABNORMAL K/UL (ref 0–0.3)
ABSOLUTE LYMPH #: 3.7 K/UL (ref 1–4.8)
ABSOLUTE MONO #: 1.3 K/UL (ref 0.1–1.2)
ACETAMINOPHEN LEVEL: <5 UG/ML (ref 10–30)
AMPHETAMINE SCREEN URINE: NEGATIVE
ANION GAP SERPL CALCULATED.3IONS-SCNC: 23 MMOL/L (ref 9–17)
BARBITURATE SCREEN URINE: NEGATIVE
BASOPHILS # BLD: 1 % (ref 0–2)
BASOPHILS ABSOLUTE: 0.1 K/UL (ref 0–0.2)
BENZODIAZEPINE SCREEN, URINE: NEGATIVE
BUN BLDV-MCNC: 11 MG/DL (ref 6–20)
BUN/CREAT BLD: 11 (ref 9–20)
BUPRENORPHINE URINE: NEGATIVE
CALCIUM SERPL-MCNC: 9.9 MG/DL (ref 8.6–10.4)
CANNABINOID SCREEN URINE: POSITIVE
CHLORIDE BLD-SCNC: 99 MMOL/L (ref 98–107)
CO2: 16 MMOL/L (ref 20–31)
COCAINE METABOLITE, URINE: NEGATIVE
CREAT SERPL-MCNC: 0.97 MG/DL (ref 0.7–1.2)
DIFFERENTIAL TYPE: ABNORMAL
EKG ATRIAL RATE: 69 BPM
EKG P AXIS: 39 DEGREES
EKG P-R INTERVAL: 128 MS
EKG Q-T INTERVAL: 410 MS
EKG QRS DURATION: 92 MS
EKG QTC CALCULATION (BAZETT): 439 MS
EKG R AXIS: 42 DEGREES
EKG T AXIS: 26 DEGREES
EKG VENTRICULAR RATE: 69 BPM
EOSINOPHILS RELATIVE PERCENT: 0 % (ref 1–8)
ETHANOL PERCENT: NORMAL %
ETHANOL: <10 MG/DL
GFR AFRICAN AMERICAN: >60 ML/MIN
GFR NON-AFRICAN AMERICAN: >60 ML/MIN
GFR SERPL CREATININE-BSD FRML MDRD: ABNORMAL ML/MIN/{1.73_M2}
GFR SERPL CREATININE-BSD FRML MDRD: ABNORMAL ML/MIN/{1.73_M2}
GLUCOSE BLD-MCNC: 117 MG/DL (ref 70–99)
HCT VFR BLD CALC: 42.8 % (ref 41–53)
HEMOGLOBIN: 14.4 G/DL (ref 13.5–17.5)
IMMATURE GRANULOCYTES: ABNORMAL %
LYMPHOCYTES # BLD: 24 % (ref 15–43)
MCH RBC QN AUTO: 29.3 PG (ref 26–34)
MCHC RBC AUTO-ENTMCNC: 33.8 G/DL (ref 31–37)
MCV RBC AUTO: 86.8 FL (ref 80–100)
MDMA URINE: ABNORMAL
METHADONE SCREEN, URINE: NEGATIVE
METHAMPHETAMINE, URINE: NEGATIVE
MONOCYTES # BLD: 8 % (ref 6–14)
NRBC AUTOMATED: ABNORMAL PER 100 WBC
OPIATES, URINE: NEGATIVE
OXYCODONE SCREEN URINE: NEGATIVE
PDW BLD-RTO: 13.9 % (ref 11–14.5)
PHENCYCLIDINE, URINE: NEGATIVE
PLATELET # BLD: 533 K/UL (ref 140–450)
PLATELET ESTIMATE: ABNORMAL
PMV BLD AUTO: 7 FL (ref 6–12)
POTASSIUM SERPL-SCNC: 3.3 MMOL/L (ref 3.7–5.3)
PROPOXYPHENE, URINE: NEGATIVE
RBC # BLD: 4.93 M/UL (ref 4.5–5.9)
RBC # BLD: ABNORMAL 10*6/UL
SALICYLATE LEVEL: <1 MG/DL (ref 3–10)
SEG NEUTROPHILS: 67 % (ref 44–74)
SEGMENTED NEUTROPHILS ABSOLUTE COUNT: 10.6 K/UL (ref 1.8–7.7)
SODIUM BLD-SCNC: 138 MMOL/L (ref 135–144)
TEST INFORMATION: ABNORMAL
TRICYCLIC ANTIDEPRESSANTS, UR: POSITIVE
WBC # BLD: 15.6 K/UL (ref 3.5–11)
WBC # BLD: ABNORMAL 10*3/UL

## 2019-09-23 PROCEDURE — 96374 THER/PROPH/DIAG INJ IV PUSH: CPT

## 2019-09-23 PROCEDURE — G0480 DRUG TEST DEF 1-7 CLASSES: HCPCS

## 2019-09-23 PROCEDURE — 6360000002 HC RX W HCPCS: Performed by: EMERGENCY MEDICINE

## 2019-09-23 PROCEDURE — 99284 EMERGENCY DEPT VISIT MOD MDM: CPT

## 2019-09-23 PROCEDURE — 80307 DRUG TEST PRSMV CHEM ANLYZR: CPT

## 2019-09-23 PROCEDURE — 85025 COMPLETE CBC W/AUTO DIFF WBC: CPT

## 2019-09-23 PROCEDURE — 80306 DRUG TEST PRSMV INSTRMNT: CPT

## 2019-09-23 PROCEDURE — 80048 BASIC METABOLIC PNL TOTAL CA: CPT

## 2019-09-23 RX ORDER — LORAZEPAM 2 MG/ML
1 INJECTION INTRAMUSCULAR ONCE
Status: COMPLETED | OUTPATIENT
Start: 2019-09-23 | End: 2019-09-23

## 2019-09-23 RX ADMIN — LORAZEPAM 1 MG: 2 INJECTION INTRAMUSCULAR; INTRAVENOUS at 06:07

## 2019-09-23 ASSESSMENT — PAIN DESCRIPTION - LOCATION: LOCATION: ABDOMEN

## 2019-09-23 ASSESSMENT — ENCOUNTER SYMPTOMS
ALLERGIC/IMMUNOLOGIC NEGATIVE: 1
EYES NEGATIVE: 1
RESPIRATORY NEGATIVE: 1
GASTROINTESTINAL NEGATIVE: 1

## 2019-09-23 ASSESSMENT — PAIN SCALES - GENERAL: PAINLEVEL_OUTOF10: 8

## 2019-09-23 ASSESSMENT — PAIN DESCRIPTION - PAIN TYPE: TYPE: ACUTE PAIN

## 2019-09-23 NOTE — ED PROVIDER NOTES
(86.2 kg)   SpO2 96%   BMI 27.26 kg/m²         I have reviewed the disposition diagnosis with the patient and or their family/guardian. I have answered their questions and given discharge instructions. They voiced understanding of these instructions and did not have any further questions or complaints. CRITICAL CARE:    None    CONSULTS:    None    PROCEDURES:    None      OARRS Report if indicated             FINAL IMPRESSION    No diagnosis found. DISPOSITION/PLAN   DISPOSITION       PATIENT REFERRED TO:  No follow-up provider specified.     DISCHARGE MEDICATIONS:  New Prescriptions    No medications on file       (Please note that portions of this note were completed with a voice recognition program.  Efforts were made to edit the dictations but occasionally words are mis-transcribed.)    Zimmer MD  Attending Emergency Physician            Irene Ott MD  10/06/19 4131 Tai Feliz III, MD  10/06/19 5319

## 2019-09-23 NOTE — ED PROVIDER NOTES
[facility] ED  [address]  Phone: [phone]        ADDENDUM:      Care of this patient was assumed from  [address] [facility]. The patient was seen for Suicidal  .  The patient's initial evaluation and plan have been discussed with the prior provider who initially evaluated the patient. Nursing Notes, Past Medical Hx, Past Surgical Hx, Allergies, were all reviewed. PAST MEDICAL HISTORY    has a past medical history of Acid reflux, Anxiety, Appendicitis, Diverticulitis, History of acute renal failure, History of stomach ulcers, Irritable bowel syndrome, and Ulcer. SURGICAL HISTORY      has a past surgical history that includes Appendectomy; Upper gastrointestinal endoscopy; Colonoscopy; and Mayer tooth extraction. CURRENT MEDICATIONS       Previous Medications    CHOLECALCIFEROL (VITAMIN D3) 2000 UNITS CAPS    Take 2 capsules by mouth daily    DICYCLOMINE (BENTYL) 10 MG CAPSULE    Take 1 capsule by mouth 4 times daily    ERGOCALCIFEROL (VITAMIN D2 PO)    Take 1 capsule by mouth once a week Indications: 50,00 IU    FLUTICASONE (FLONASE) 50 MCG/ACT NASAL SPRAY    1 spray by Each Nare route daily    HYDROXYZINE (ATARAX) 50 MG TABLET    Take 1 tablet by mouth 3 times daily as needed for Itching    LORAZEPAM (ATIVAN) 1 MG TABLET    Take 1 mg by mouth every 6 hours as needed for Anxiety. MIRTAZAPINE (REMERON) 30 MG TABLET    Take 1 tablet by mouth nightly    PANTOPRAZOLE (PROTONIX) 20 MG TABLET    TAKE 1 TABLET BY MOUTH TWICE DAILY    PRAZOSIN (MINIPRESS) 2 MG CAPSULE    Take 1 capsule by mouth nightly    PROMETHAZINE (PHENERGAN) 25 MG TABLET    Take 1 tablet by mouth every 6 hours as needed for Nausea    SUCRALFATE (CARAFATE) 1 GM TABLET    Take 1 tablet by mouth 4 times daily    TRAZODONE (DESYREL) 100 MG TABLET    Take 1 tablet by mouth nightly as needed for Sleep       ALLERGIES     is allergic to haldol [haloperidol] and zoloft [sertraline hcl].       Diagnostic NEGATIVE    Cannabinoid Scrn, Ur POSITIVE (A) NEGATIVE    Oxycodone Screen, Ur NEGATIVE NEGATIVE    Methamphetamine, Urine NEGATIVE NEGATIVE    Tricyclic Antidepressants, Urine POSITIVE (A) NEGATIVE    MDMA, Urine NOT REPORTED NEGATIVE    Buprenorphine Urine NEGATIVE NEGATIVE    Test Information       The following threshold concentrations are established for the drug assays:   Ethanol   Result Value Ref Range    Ethanol <10 <10 mg/dL    Ethanol percent NOT REPORTED %   Acetaminophen Level   Result Value Ref Range    Acetaminophen Level <5 (L) 10 - 30 ug/mL   Salicylate   Result Value Ref Range    Salicylate Lvl <1 (L) 3 - 10 mg/dL       RADIOLOGY:  No orders to display       RECENT VITALS:  BP: 128/73, Temp: 99.4 °F (37.4 °C), Pulse: 67, Resp: 14     ED Course     The patient was given the following medications:  Orders Placed This Encounter   Medications    LORazepam (ATIVAN) injection 1 mg       Medical Decision Making           Patient was signed out to me pending lab results and reevaluation. Operatory results were grossly unremarkable including negative acetaminophen and aspirin levels. Patient did improve in the emergency department. He was evaluated by the behavioral health service, he requested to go to the [facility] but they would not accept him, patient states he is not suicidal and would like to go home. Safety plan was offered and agreed to. Patient does have a pretty long-standing history of chronic depression and suicidal ideation. Patient has follow-up and return to ER information, he is seeing his psychiatrist tomorrow    At this time the patient is without objective evidence of an acute process requiring hospitalization or inpatient management. They have remained hemodynamically stable and are stable for discharge with outpatient follow-up. Standard anticipatory guidance given to patient upon discharge.   Have given them a specific time frame in which to follow-up and who to follow-up with.  I have also advised them that they should return to the emergency department if they get worse, or not getting better or develop any new or concerning symptoms. Patient demonstrates understanding. EMERGENCY DEPARTMENT COURSE:   Vitals:    Vitals:    09/23/19 0553 09/23/19 0558 09/23/19 0727   BP:  (!) 170/88 128/73   Pulse: 84  67   Resp: 25  14   Temp: 99.4 °F (37.4 °C)     TempSrc: Tympanic     SpO2: 96%  98%   Weight: 190 lb (86.2 kg)     Height: 5' 10\" (1.778 m)       -------------------------  BP: 128/73, Temp: 99.4 °F (37.4 °C), Pulse: 67, Resp: 14      RE-EVALUATION:  Improved     CONSULTS:  behavioral    PROCEDURES:  None    FINAL IMPRESSION      1. Depressive disorder          DISPOSITION/PLAN   DISPOSITION Decision To Discharge 09/23/2019 11:55:56 AM      CONDITION ON DISPOSITION:   stable    PATIENT REFERRED TO:  Keep your appointment with Dr. Jeanna Tapia:  New Prescriptions    No medications on file       (Please note that portions of this note were completed with a voicerecognition program.  Efforts were made to edit the dictations but occasionally words are mis-transcribed.)    Sears MD, F.A.C.E.P.   Attending Emergency Medicine Physician                      Zayra Kay,   09/23/19 0938

## 2019-09-23 NOTE — ED NOTES
[facility] called for crisis intervention screen. They state that their screener will be here before 0900.      Emery Vizcarra RN  09/23/19 2180

## 2019-09-24 ENCOUNTER — TELEPHONE (OUTPATIENT)
Dept: FAMILY MEDICINE CLINIC | Age: 29
End: 2019-09-24

## 2019-09-24 ENCOUNTER — HOSPITAL ENCOUNTER (OUTPATIENT)
Dept: NEUROLOGY | Age: 29
Discharge: HOME | End: 2019-09-24
Payer: MEDICARE

## 2019-09-24 DIAGNOSIS — F43.10 POST TRAUMATIC STRESS DISORDER: ICD-10-CM

## 2019-09-24 DIAGNOSIS — F41.0 GENERALIZED ANXIETY DISORDER WITH PANIC ATTACKS: ICD-10-CM

## 2019-09-24 DIAGNOSIS — R56.9 SEIZURE-LIKE ACTIVITY (HCC): ICD-10-CM

## 2019-09-24 DIAGNOSIS — F41.1 GENERALIZED ANXIETY DISORDER WITH PANIC ATTACKS: ICD-10-CM

## 2019-09-24 DIAGNOSIS — F19.10 DRUG ABUSE (HCC): ICD-10-CM

## 2019-09-24 PROCEDURE — 95957 EEG DIGITAL ANALYSIS: CPT

## 2019-09-24 PROCEDURE — 95813 EEG EXTND MNTR 61-119 MIN: CPT

## 2019-09-24 NOTE — TELEPHONE ENCOUNTER
Spoke to Dr Jackeline Hook- she stated it is not necessary it is only off slightly - we can recheck in a few months-   Went down to rehab and talked to [facility] and let her know just to keep his appt on Oct 7th and if she is worried about his anxiety meds she needs to contact his psychatrist  She said she may call her

## 2019-09-25 ENCOUNTER — HOSPITAL ENCOUNTER (EMERGENCY)
Age: 29
Discharge: ANOTHER HOSPITAL | End: 2019-09-25
Attending: SPECIALIST
Payer: MEDICARE

## 2019-09-25 VITALS
HEART RATE: 78 BPM | RESPIRATION RATE: 24 BRPM | SYSTOLIC BLOOD PRESSURE: 144 MMHG | OXYGEN SATURATION: 99 % | DIASTOLIC BLOOD PRESSURE: 102 MMHG | TEMPERATURE: 98.1 F

## 2019-09-25 DIAGNOSIS — R45.851 SUICIDAL IDEATION: ICD-10-CM

## 2019-09-25 DIAGNOSIS — F32.A DEPRESSION, UNSPECIFIED DEPRESSION TYPE: Primary | ICD-10-CM

## 2019-09-25 LAB
-: ABNORMAL
ABSOLUTE EOS #: 0 K/UL (ref 0–0.4)
ABSOLUTE IMMATURE GRANULOCYTE: ABNORMAL K/UL (ref 0–0.3)
ABSOLUTE LYMPH #: 2.1 K/UL (ref 1–4.8)
ABSOLUTE MONO #: 0.9 K/UL (ref 0.1–1.2)
ALBUMIN SERPL-MCNC: 4.6 G/DL (ref 3.5–5.2)
ALBUMIN/GLOBULIN RATIO: 1.6 (ref 1–2.5)
ALP BLD-CCNC: 86 U/L (ref 40–129)
ALT SERPL-CCNC: 27 U/L (ref 5–41)
AMORPHOUS: ABNORMAL
AMPHETAMINE SCREEN URINE: NEGATIVE
ANION GAP SERPL CALCULATED.3IONS-SCNC: 19 MMOL/L (ref 9–17)
AST SERPL-CCNC: 18 U/L
BACTERIA: ABNORMAL
BARBITURATE SCREEN URINE: NEGATIVE
BASOPHILS # BLD: 1 % (ref 0–2)
BASOPHILS ABSOLUTE: 0.1 K/UL (ref 0–0.2)
BENZODIAZEPINE SCREEN, URINE: POSITIVE
BILIRUB SERPL-MCNC: 0.93 MG/DL (ref 0.3–1.2)
BILIRUBIN URINE: NEGATIVE
BUN BLDV-MCNC: 12 MG/DL (ref 6–20)
BUN/CREAT BLD: 13 (ref 9–20)
BUPRENORPHINE URINE: NEGATIVE
CALCIUM SERPL-MCNC: 10.2 MG/DL (ref 8.6–10.4)
CANNABINOID SCREEN URINE: POSITIVE
CASTS UA: ABNORMAL /LPF (ref 0–2)
CHLORIDE BLD-SCNC: 101 MMOL/L (ref 98–107)
CO2: 18 MMOL/L (ref 20–31)
COCAINE METABOLITE, URINE: NEGATIVE
COLOR: ABNORMAL
COMMENT UA: ABNORMAL
CREAT SERPL-MCNC: 0.94 MG/DL (ref 0.7–1.2)
CRYSTALS, UA: ABNORMAL /HPF
DIFFERENTIAL TYPE: ABNORMAL
EOSINOPHILS RELATIVE PERCENT: 0 % (ref 1–8)
EPITHELIAL CELLS UA: ABNORMAL /HPF (ref 0–5)
ETHANOL PERCENT: NORMAL %
ETHANOL: <10 MG/DL
GFR AFRICAN AMERICAN: >60 ML/MIN
GFR NON-AFRICAN AMERICAN: >60 ML/MIN
GFR SERPL CREATININE-BSD FRML MDRD: ABNORMAL ML/MIN/{1.73_M2}
GFR SERPL CREATININE-BSD FRML MDRD: ABNORMAL ML/MIN/{1.73_M2}
GLUCOSE BLD-MCNC: 109 MG/DL (ref 70–99)
GLUCOSE URINE: NEGATIVE
HCT VFR BLD CALC: 44.5 % (ref 41–53)
HEMOGLOBIN: 15.5 G/DL (ref 13.5–17.5)
IMMATURE GRANULOCYTES: ABNORMAL %
KETONES, URINE: ABNORMAL
LEUKOCYTE ESTERASE, URINE: NEGATIVE
LYMPHOCYTES # BLD: 21 % (ref 15–43)
MAGNESIUM: 2.3 MG/DL (ref 1.6–2.6)
MCH RBC QN AUTO: 30.4 PG (ref 26–34)
MCHC RBC AUTO-ENTMCNC: 34.9 G/DL (ref 31–37)
MCV RBC AUTO: 87.3 FL (ref 80–100)
MDMA URINE: ABNORMAL
METHADONE SCREEN, URINE: NEGATIVE
METHAMPHETAMINE, URINE: NEGATIVE
MONOCYTES # BLD: 9 % (ref 6–14)
MUCUS: ABNORMAL
NITRITE, URINE: NEGATIVE
NRBC AUTOMATED: ABNORMAL PER 100 WBC
OPIATES, URINE: NEGATIVE
OTHER OBSERVATIONS UA: ABNORMAL
OXYCODONE SCREEN URINE: NEGATIVE
PDW BLD-RTO: 14 % (ref 11–14.5)
PH UA: 6 (ref 5–6)
PHENCYCLIDINE, URINE: NEGATIVE
PLATELET # BLD: 478 K/UL (ref 140–450)
PLATELET ESTIMATE: ABNORMAL
PMV BLD AUTO: 6.8 FL (ref 6–12)
POTASSIUM SERPL-SCNC: 3.5 MMOL/L (ref 3.7–5.3)
PROPOXYPHENE, URINE: NEGATIVE
PROTEIN UA: NEGATIVE
RBC # BLD: 5.1 M/UL (ref 4.5–5.9)
RBC # BLD: ABNORMAL 10*6/UL
RBC UA: ABNORMAL /HPF (ref 0–4)
RENAL EPITHELIAL, UA: ABNORMAL /HPF
SEG NEUTROPHILS: 69 % (ref 44–74)
SEGMENTED NEUTROPHILS ABSOLUTE COUNT: 6.7 K/UL (ref 1.8–7.7)
SODIUM BLD-SCNC: 138 MMOL/L (ref 135–144)
SPECIFIC GRAVITY UA: 1.01 (ref 1.01–1.02)
TEST INFORMATION: ABNORMAL
TOTAL PROTEIN: 7.4 G/DL (ref 6.4–8.3)
TRICHOMONAS: ABNORMAL
TRICYCLIC ANTIDEPRESSANTS, UR: NEGATIVE
TURBIDITY: ABNORMAL
URINE HGB: NEGATIVE
UROBILINOGEN, URINE: NORMAL
WBC # BLD: 9.8 K/UL (ref 3.5–11)
WBC # BLD: ABNORMAL 10*3/UL
WBC UA: ABNORMAL /HPF (ref 0–4)
YEAST: ABNORMAL

## 2019-09-25 PROCEDURE — 81001 URINALYSIS AUTO W/SCOPE: CPT

## 2019-09-25 PROCEDURE — 36415 COLL VENOUS BLD VENIPUNCTURE: CPT

## 2019-09-25 PROCEDURE — 99284 EMERGENCY DEPT VISIT MOD MDM: CPT

## 2019-09-25 PROCEDURE — 6370000000 HC RX 637 (ALT 250 FOR IP): Performed by: SPECIALIST

## 2019-09-25 PROCEDURE — 85025 COMPLETE CBC W/AUTO DIFF WBC: CPT

## 2019-09-25 PROCEDURE — 80053 COMPREHEN METABOLIC PANEL: CPT

## 2019-09-25 PROCEDURE — 83735 ASSAY OF MAGNESIUM: CPT

## 2019-09-25 PROCEDURE — 90471 IMMUNIZATION ADMIN: CPT | Performed by: SPECIALIST

## 2019-09-25 PROCEDURE — 80306 DRUG TEST PRSMV INSTRMNT: CPT

## 2019-09-25 PROCEDURE — 90715 TDAP VACCINE 7 YRS/> IM: CPT | Performed by: SPECIALIST

## 2019-09-25 PROCEDURE — 6360000002 HC RX W HCPCS: Performed by: SPECIALIST

## 2019-09-25 PROCEDURE — G0480 DRUG TEST DEF 1-7 CLASSES: HCPCS

## 2019-09-25 RX ORDER — LORAZEPAM 0.5 MG/1
2 TABLET ORAL ONCE
Status: COMPLETED | OUTPATIENT
Start: 2019-09-25 | End: 2019-09-25

## 2019-09-25 RX ORDER — DIAPER,BRIEF,INFANT-TODD,DISP
EACH MISCELLANEOUS ONCE
Status: COMPLETED | OUTPATIENT
Start: 2019-09-25 | End: 2019-09-25

## 2019-09-25 RX ADMIN — TETANUS TOXOID, REDUCED DIPHTHERIA TOXOID AND ACELLULAR PERTUSSIS VACCINE, ADSORBED 0.5 ML: 5; 2.5; 8; 8; 2.5 SUSPENSION INTRAMUSCULAR at 10:15

## 2019-09-25 RX ADMIN — BACITRACIN ZINC 1 G: 500 OINTMENT TOPICAL at 10:15

## 2019-09-25 RX ADMIN — LORAZEPAM 2 MG: 0.5 TABLET ORAL at 10:15

## 2019-09-25 ASSESSMENT — PAIN DESCRIPTION - PAIN TYPE: TYPE: ACUTE PAIN

## 2019-09-25 ASSESSMENT — ENCOUNTER SYMPTOMS
NAUSEA: 0
ABDOMINAL PAIN: 0
SHORTNESS OF BREATH: 0

## 2019-09-25 ASSESSMENT — PATIENT HEALTH QUESTIONNAIRE - PHQ9: SUM OF ALL RESPONSES TO PHQ QUESTIONS 1-9: 27

## 2019-09-25 ASSESSMENT — PAIN DESCRIPTION - LOCATION: LOCATION: ABDOMEN

## 2019-09-25 NOTE — ED PROVIDER NOTES
and memory are normal. He expresses suicidal ideation. He expresses no homicidal ideation. Nursing note and vitals reviewed.         DIFFERENTIAL DIAGNOSIS/ MDM:     Depressive disorder, suicidal ideations, anxiety disorder  We will obtain baseline labs including drug screen, serum alcohol level and consult [facility] for psych evaluation    DIAGNOSTIC RESULTS     EKG: All EKG's are interpreted by the Emergency Department Physician who either signs or Co-signs this chart in the absence of a cardiologist.    None obtained      RADIOLOGY:   I directly visualized the following  images and reviewed the radiologist interpretations:          ED BEDSIDE ULTRASOUND:       LABS:  Labs Reviewed   CBC WITH AUTO DIFFERENTIAL - Abnormal; Notable for the following components:       Result Value    Platelets 715 (*)     Eosinophils % 0 (*)     All other components within normal limits   COMPREHENSIVE METABOLIC PANEL W/ REFLEX TO MG FOR LOW K - Abnormal; Notable for the following components:    Glucose 109 (*)     Potassium 3.5 (*)     CO2 18 (*)     Anion Gap 19 (*)     All other components within normal limits   URINE RT REFLEX TO CULTURE - Abnormal; Notable for the following components:    Ketones, Urine TRACE (*)     All other components within normal limits   URINE DRUG SCREEN - Abnormal; Notable for the following components:    Benzodiazepine Screen, Urine POSITIVE (*)     Cannabinoid Scrn, Ur POSITIVE (*)     All other components within normal limits   MICROSCOPIC URINALYSIS - Abnormal; Notable for the following components:    Crystals UA 25 TO 50 CALCIUM OXALATE (*)     Bacteria, UA TRACE (*)     Mucus, UA 1+ (*)     All other components within normal limits   ETHANOL   MAGNESIUM       I reviewed all the lab results and urine drug screen is positive for benzodiazepine and cannabinoids    EMERGENCY DEPARTMENT COURSE:   Vitals:    Vitals:    09/25/19 0952 09/25/19 1737   BP: 122/76 (!) 144/102   Pulse: 114 78   Resp: 24 Physician                          Ivan Mendoza MD  09/26/19 1468

## 2019-09-25 NOTE — ED NOTES
Pt resting with legs still and resps easy. Pt appears to be sleeping.       Azam Khan, GARRETT  09/25/19 6757

## 2019-09-25 NOTE — PROGRESS NOTES
was called by nurse to meet with patient and his mother to discuss power of  details, per mother's request.     Assessment: Patient was laying in bed and did not communicate much with . Patient's mother stated he has encountered a significant amount of loss and suffers from PTSD. Patient's mother seemed to have confusion regarding what legal steps she wanted to take. Patient's mother is also quite worried about her son's mental state and her own personal issues. Intervention:  provided an empathetic and listening ear for patient's mother.  and mother also explored areas of support that mother could seek out, such as parent support groups.  also helped mother determine what type of legal control she was seeking, overall legal POA vs. Medical patient advocate. Outcome:  Patient's mother determined she was seeking overall legal power of , and stated she will probably seek out help from the court. Patient's mother expressed appreciation and stated she was happy to have someone to talk to. Plan: Chaplains will remain available to offer spiritual and emotional support as needed.

## 2019-10-28 ENCOUNTER — TELEPHONE (OUTPATIENT)
Dept: FAMILY MEDICINE CLINIC | Age: 29
End: 2019-10-28

## 2019-11-23 ENCOUNTER — HOSPITAL ENCOUNTER (EMERGENCY)
Age: 29
Discharge: ANOTHER HOSPITAL | End: 2019-11-23
Attending: EMERGENCY MEDICINE
Payer: MEDICARE

## 2019-11-23 VITALS
HEART RATE: 98 BPM | SYSTOLIC BLOOD PRESSURE: 141 MMHG | TEMPERATURE: 98.8 F | DIASTOLIC BLOOD PRESSURE: 72 MMHG | OXYGEN SATURATION: 98 % | RESPIRATION RATE: 20 BRPM

## 2019-11-23 DIAGNOSIS — F41.1 ANXIETY STATE: Primary | ICD-10-CM

## 2019-11-23 LAB
-: ABNORMAL
ABSOLUTE EOS #: 0 K/UL (ref 0–0.4)
ABSOLUTE IMMATURE GRANULOCYTE: ABNORMAL K/UL (ref 0–0.3)
ABSOLUTE LYMPH #: 2.5 K/UL (ref 1–4.8)
ABSOLUTE MONO #: 1.1 K/UL (ref 0.1–1.2)
ACETAMINOPHEN LEVEL: <5 UG/ML (ref 10–30)
ALBUMIN SERPL-MCNC: 5.1 G/DL (ref 3.5–5.2)
ALBUMIN/GLOBULIN RATIO: 1.7 (ref 1–2.5)
ALP BLD-CCNC: 82 U/L (ref 40–129)
ALT SERPL-CCNC: 22 U/L (ref 5–41)
AMORPHOUS: ABNORMAL
AMPHETAMINE SCREEN URINE: NEGATIVE
ANION GAP SERPL CALCULATED.3IONS-SCNC: 21 MMOL/L (ref 9–17)
AST SERPL-CCNC: 15 U/L
BACTERIA: ABNORMAL
BARBITURATE SCREEN URINE: NEGATIVE
BASOPHILS # BLD: 0 % (ref 0–2)
BASOPHILS ABSOLUTE: 0 K/UL (ref 0–0.2)
BENZODIAZEPINE SCREEN, URINE: POSITIVE
BILIRUB SERPL-MCNC: 1.15 MG/DL (ref 0.3–1.2)
BILIRUBIN URINE: ABNORMAL
BUN BLDV-MCNC: 18 MG/DL (ref 6–20)
BUN/CREAT BLD: 18 (ref 9–20)
BUPRENORPHINE URINE: NEGATIVE
CALCIUM SERPL-MCNC: 10.1 MG/DL (ref 8.6–10.4)
CANNABINOID SCREEN URINE: POSITIVE
CASTS UA: ABNORMAL /LPF (ref 0–2)
CHLORIDE BLD-SCNC: 100 MMOL/L (ref 98–107)
CO2: 18 MMOL/L (ref 20–31)
COCAINE METABOLITE, URINE: NEGATIVE
COLOR: ABNORMAL
COMMENT UA: ABNORMAL
CREAT SERPL-MCNC: 0.98 MG/DL (ref 0.7–1.2)
CRYSTALS, UA: ABNORMAL /HPF
DIFFERENTIAL TYPE: ABNORMAL
EOSINOPHILS RELATIVE PERCENT: 0 % (ref 1–8)
EPITHELIAL CELLS UA: ABNORMAL /HPF (ref 0–5)
ETHANOL PERCENT: NORMAL %
ETHANOL: <10 MG/DL
GFR AFRICAN AMERICAN: >60 ML/MIN
GFR NON-AFRICAN AMERICAN: >60 ML/MIN
GFR SERPL CREATININE-BSD FRML MDRD: ABNORMAL ML/MIN/{1.73_M2}
GFR SERPL CREATININE-BSD FRML MDRD: ABNORMAL ML/MIN/{1.73_M2}
GLUCOSE BLD-MCNC: 157 MG/DL (ref 70–99)
GLUCOSE URINE: NEGATIVE
HCT VFR BLD CALC: 45.1 % (ref 41–53)
HEMOGLOBIN: 15.4 G/DL (ref 13.5–17.5)
IMMATURE GRANULOCYTES: ABNORMAL %
KETONES, URINE: ABNORMAL
LEUKOCYTE ESTERASE, URINE: NEGATIVE
LIPASE: 10 U/L (ref 13–60)
LYMPHOCYTES # BLD: 15 % (ref 15–43)
MAGNESIUM: 2.2 MG/DL (ref 1.6–2.6)
MCH RBC QN AUTO: 29.8 PG (ref 26–34)
MCHC RBC AUTO-ENTMCNC: 34.1 G/DL (ref 31–37)
MCV RBC AUTO: 87.5 FL (ref 80–100)
MDMA URINE: ABNORMAL
METHADONE SCREEN, URINE: NEGATIVE
METHAMPHETAMINE, URINE: NEGATIVE
MONOCYTES # BLD: 7 % (ref 6–14)
MUCUS: ABNORMAL
NITRITE, URINE: NEGATIVE
NRBC AUTOMATED: ABNORMAL PER 100 WBC
OPIATES, URINE: NEGATIVE
OTHER OBSERVATIONS UA: ABNORMAL
OXYCODONE SCREEN URINE: NEGATIVE
PDW BLD-RTO: 14.6 % (ref 11–14.5)
PH UA: 6.5 (ref 5–6)
PHENCYCLIDINE, URINE: NEGATIVE
PLATELET # BLD: 456 K/UL (ref 140–450)
PLATELET ESTIMATE: ABNORMAL
PMV BLD AUTO: 7.4 FL (ref 6–12)
POTASSIUM SERPL-SCNC: 3.7 MMOL/L (ref 3.7–5.3)
PROPOXYPHENE, URINE: NEGATIVE
PROTEIN UA: ABNORMAL
RBC # BLD: 5.15 M/UL (ref 4.5–5.9)
RBC # BLD: ABNORMAL 10*6/UL
RBC UA: ABNORMAL /HPF (ref 0–4)
RENAL EPITHELIAL, UA: ABNORMAL /HPF
SALICYLATE LEVEL: <1 MG/DL (ref 3–10)
SEG NEUTROPHILS: 78 % (ref 44–74)
SEGMENTED NEUTROPHILS ABSOLUTE COUNT: 13.6 K/UL (ref 1.8–7.7)
SODIUM BLD-SCNC: 139 MMOL/L (ref 135–144)
SPECIFIC GRAVITY UA: 1.02 (ref 1.01–1.02)
TEST INFORMATION: ABNORMAL
TOTAL PROTEIN: 8.1 G/DL (ref 6.4–8.3)
TRICHOMONAS: ABNORMAL
TRICYCLIC ANTIDEPRESSANTS, UR: NEGATIVE
TSH SERPL DL<=0.05 MIU/L-ACNC: 0.31 MIU/L (ref 0.3–5)
TURBIDITY: ABNORMAL
URINE HGB: NEGATIVE
UROBILINOGEN, URINE: NORMAL
WBC # BLD: 17.3 K/UL (ref 3.5–11)
WBC # BLD: ABNORMAL 10*3/UL
WBC UA: ABNORMAL /HPF (ref 0–4)
YEAST: ABNORMAL

## 2019-11-23 PROCEDURE — 80306 DRUG TEST PRSMV INSTRMNT: CPT

## 2019-11-23 PROCEDURE — 80307 DRUG TEST PRSMV CHEM ANLYZR: CPT

## 2019-11-23 PROCEDURE — 83690 ASSAY OF LIPASE: CPT

## 2019-11-23 PROCEDURE — 84443 ASSAY THYROID STIM HORMONE: CPT

## 2019-11-23 PROCEDURE — G0480 DRUG TEST DEF 1-7 CLASSES: HCPCS

## 2019-11-23 PROCEDURE — 2580000003 HC RX 258: Performed by: EMERGENCY MEDICINE

## 2019-11-23 PROCEDURE — 96375 TX/PRO/DX INJ NEW DRUG ADDON: CPT

## 2019-11-23 PROCEDURE — 80053 COMPREHEN METABOLIC PANEL: CPT

## 2019-11-23 PROCEDURE — 6360000002 HC RX W HCPCS: Performed by: EMERGENCY MEDICINE

## 2019-11-23 PROCEDURE — 83735 ASSAY OF MAGNESIUM: CPT

## 2019-11-23 PROCEDURE — 85025 COMPLETE CBC W/AUTO DIFF WBC: CPT

## 2019-11-23 PROCEDURE — 99284 EMERGENCY DEPT VISIT MOD MDM: CPT

## 2019-11-23 PROCEDURE — 96374 THER/PROPH/DIAG INJ IV PUSH: CPT

## 2019-11-23 PROCEDURE — 81001 URINALYSIS AUTO W/SCOPE: CPT

## 2019-11-23 RX ORDER — 0.9 % SODIUM CHLORIDE 0.9 %
1000 INTRAVENOUS SOLUTION INTRAVENOUS ONCE
Status: COMPLETED | OUTPATIENT
Start: 2019-11-23 | End: 2019-11-23

## 2019-11-23 RX ORDER — LORAZEPAM 2 MG/ML
1 INJECTION INTRAMUSCULAR ONCE
Status: COMPLETED | OUTPATIENT
Start: 2019-11-23 | End: 2019-11-23

## 2019-11-23 RX ORDER — HYDROXYZINE HYDROCHLORIDE 50 MG/ML
50 INJECTION, SOLUTION INTRAMUSCULAR EVERY 6 HOURS PRN
Status: DISCONTINUED | OUTPATIENT
Start: 2019-11-23 | End: 2019-11-23 | Stop reason: HOSPADM

## 2019-11-23 RX ORDER — ONDANSETRON 2 MG/ML
4 INJECTION INTRAMUSCULAR; INTRAVENOUS ONCE
Status: COMPLETED | OUTPATIENT
Start: 2019-11-23 | End: 2019-11-23

## 2019-11-23 RX ADMIN — HYDROXYZINE HYDROCHLORIDE 50 MG: 50 INJECTION, SOLUTION INTRAMUSCULAR at 08:53

## 2019-11-23 RX ADMIN — SODIUM CHLORIDE 1000 ML: 9 INJECTION, SOLUTION INTRAVENOUS at 08:53

## 2019-11-23 RX ADMIN — ONDANSETRON 4 MG: 2 INJECTION INTRAMUSCULAR; INTRAVENOUS at 08:53

## 2019-11-23 RX ADMIN — SODIUM CHLORIDE 1000 ML: 9 INJECTION, SOLUTION INTRAVENOUS at 11:15

## 2019-11-23 RX ADMIN — LORAZEPAM 1 MG: 2 INJECTION, SOLUTION INTRAMUSCULAR; INTRAVENOUS at 11:13

## 2019-11-23 ASSESSMENT — PAIN DESCRIPTION - LOCATION: LOCATION: BACK

## 2019-11-23 ASSESSMENT — ENCOUNTER SYMPTOMS
COUGH: 0
BLOOD IN STOOL: 0
NAUSEA: 1
SHORTNESS OF BREATH: 0
VOMITING: 1
SORE THROAT: 0
DIARRHEA: 0
ABDOMINAL DISTENTION: 0

## 2019-11-23 ASSESSMENT — PAIN DESCRIPTION - PAIN TYPE: TYPE: ACUTE PAIN

## 2019-11-23 ASSESSMENT — PAIN SCALES - GENERAL: PAINLEVEL_OUTOF10: 7

## 2019-11-23 ASSESSMENT — PAIN DESCRIPTION - DESCRIPTORS: DESCRIPTORS: CRAMPING

## 2019-12-13 ENCOUNTER — HOSPITAL ENCOUNTER (EMERGENCY)
Age: 29
Discharge: HOME | End: 2019-12-13
Attending: EMERGENCY MEDICINE
Payer: MEDICARE

## 2019-12-13 ENCOUNTER — OFFICE VISIT (OUTPATIENT)
Dept: FAMILY MEDICINE CLINIC | Age: 29
End: 2019-12-13
Payer: MEDICARE

## 2019-12-13 VITALS
WEIGHT: 202 LBS | SYSTOLIC BLOOD PRESSURE: 112 MMHG | DIASTOLIC BLOOD PRESSURE: 66 MMHG | HEART RATE: 104 BPM | TEMPERATURE: 99.3 F | BODY MASS INDEX: 29.92 KG/M2 | HEIGHT: 69 IN | RESPIRATION RATE: 36 BRPM | OXYGEN SATURATION: 100 %

## 2019-12-13 VITALS
SYSTOLIC BLOOD PRESSURE: 140 MMHG | RESPIRATION RATE: 18 BRPM | HEART RATE: 99 BPM | HEIGHT: 69 IN | BODY MASS INDEX: 28.06 KG/M2 | DIASTOLIC BLOOD PRESSURE: 98 MMHG | OXYGEN SATURATION: 98 %

## 2019-12-13 DIAGNOSIS — F33.2 SEVERE EPISODE OF RECURRENT MAJOR DEPRESSIVE DISORDER, WITHOUT PSYCHOTIC FEATURES (HCC): ICD-10-CM

## 2019-12-13 DIAGNOSIS — F41.1 GENERALIZED ANXIETY DISORDER WITH PANIC ATTACKS: Primary | ICD-10-CM

## 2019-12-13 DIAGNOSIS — R10.13 EPIGASTRIC PAIN: ICD-10-CM

## 2019-12-13 DIAGNOSIS — F41.1 ANXIETY STATE: Primary | ICD-10-CM

## 2019-12-13 DIAGNOSIS — K59.04 CHRONIC IDIOPATHIC CONSTIPATION: ICD-10-CM

## 2019-12-13 DIAGNOSIS — F41.0 GENERALIZED ANXIETY DISORDER WITH PANIC ATTACKS: Primary | ICD-10-CM

## 2019-12-13 PROCEDURE — 6370000000 HC RX 637 (ALT 250 FOR IP): Performed by: EMERGENCY MEDICINE

## 2019-12-13 PROCEDURE — G8417 CALC BMI ABV UP PARAM F/U: HCPCS | Performed by: FAMILY MEDICINE

## 2019-12-13 PROCEDURE — 1036F TOBACCO NON-USER: CPT | Performed by: FAMILY MEDICINE

## 2019-12-13 PROCEDURE — 99282 EMERGENCY DEPT VISIT SF MDM: CPT

## 2019-12-13 PROCEDURE — G8427 DOCREV CUR MEDS BY ELIG CLIN: HCPCS | Performed by: FAMILY MEDICINE

## 2019-12-13 PROCEDURE — 99214 OFFICE O/P EST MOD 30 MIN: CPT | Performed by: FAMILY MEDICINE

## 2019-12-13 PROCEDURE — G8484 FLU IMMUNIZE NO ADMIN: HCPCS | Performed by: FAMILY MEDICINE

## 2019-12-13 RX ORDER — HYDROXYZINE HYDROCHLORIDE 25 MG/1
25 TABLET, FILM COATED ORAL ONCE
Status: COMPLETED | OUTPATIENT
Start: 2019-12-13 | End: 2019-12-13

## 2019-12-13 RX ORDER — METOPROLOL SUCCINATE 25 MG/1
25 TABLET, EXTENDED RELEASE ORAL DAILY
Qty: 30 TABLET | Refills: 5 | Status: SHIPPED | OUTPATIENT
Start: 2019-12-13 | End: 2020-09-15

## 2019-12-13 RX ADMIN — HYDROXYZINE HYDROCHLORIDE 25 MG: 25 TABLET, FILM COATED ORAL at 18:14

## 2019-12-13 ASSESSMENT — ENCOUNTER SYMPTOMS
COUGH: 0
WHEEZING: 0
ABDOMINAL PAIN: 0
NAUSEA: 1
VOMITING: 1
CONSTIPATION: 1
CHEST TIGHTNESS: 0
SHORTNESS OF BREATH: 1

## 2019-12-13 ASSESSMENT — PAIN SCALES - GENERAL: PAINLEVEL_OUTOF10: 2

## 2019-12-13 ASSESSMENT — PAIN DESCRIPTION - LOCATION: LOCATION: ABDOMEN

## 2020-01-14 ENCOUNTER — TELEPHONE (OUTPATIENT)
Dept: FAMILY MEDICINE CLINIC | Age: 30
End: 2020-01-14

## 2020-01-16 NOTE — TELEPHONE ENCOUNTER
[facility] Initial Follow Up Call    Outreach made within 2 business days of discharge: Yes    Patient: Gretchen Solo Patient : 1990   MRN: [id number]  Reason for Admission: Major depressive disorder  Discharge Date: 1/15/220       Spoke with: Attempt #1 2020 @ 10:15 am. Lm on  asking him to call office    Discharge department/facility: [facility] [address]        Scheduled appointment with PCP within 7-14 days    Follow Up  Future Appointments   Date Time Provider Deo Malcolm   2020  9:20 AM Cruzito Moore MD [facility]   2020  0:56 PM [facility]       Onel Inman LPN

## 2020-01-17 NOTE — TELEPHONE ENCOUNTER
[facility] Initial Follow Up Call    Outreach made within 2 business days of discharge: Yes    Patient: Lo Dvaid Patient : 1990   MRN: [id number]  Reason for Admission: There are no discharge diagnoses documented for the most recent discharge. Discharge Date: 19       Spoke with: Kylah Wang    Discharge department/facility: 1-    TCM Interactive Patient Contact:  Was patient able to fill all prescriptions: Yes  Was patient instructed to bring all medications to the follow-up visit: Yes  Is patient taking all medications as directed in the discharge summary?  Yes  Does patient understand their discharge instructions: Yes  Does patient have questions or concerns that need addressed prior to 7-14 day follow up office visit: no    Scheduled appointment with PCP within 7-14 days    Follow Up  Future Appointments   Date Time Provider Deo Malcolm   2020  9:20 AM MD [facility]   2020  4:22 PM [facility]       Jacquie Christopher LPN

## 2020-01-28 ENCOUNTER — TELEPHONE (OUTPATIENT)
Dept: FAMILY MEDICINE CLINIC | Age: 30
End: 2020-01-28

## 2020-01-28 NOTE — TELEPHONE ENCOUNTER
Patient's mom stopped at window and states that Gavino Zuleta was admitted to [facility] and will schedule an appointment after he is released.

## 2020-02-13 ENCOUNTER — OFFICE VISIT (OUTPATIENT)
Dept: NEUROLOGY | Age: 30
End: 2020-02-13
Payer: MEDICARE

## 2020-02-13 VITALS
HEART RATE: 86 BPM | DIASTOLIC BLOOD PRESSURE: 76 MMHG | OXYGEN SATURATION: 97 % | SYSTOLIC BLOOD PRESSURE: 120 MMHG | HEIGHT: 69 IN | BODY MASS INDEX: 31.1 KG/M2 | WEIGHT: 210 LBS

## 2020-02-13 PROCEDURE — 99214 OFFICE O/P EST MOD 30 MIN: CPT | Performed by: PSYCHIATRY & NEUROLOGY

## 2020-02-13 PROCEDURE — G8417 CALC BMI ABV UP PARAM F/U: HCPCS | Performed by: PSYCHIATRY & NEUROLOGY

## 2020-02-13 PROCEDURE — 1036F TOBACCO NON-USER: CPT | Performed by: PSYCHIATRY & NEUROLOGY

## 2020-02-13 PROCEDURE — G8484 FLU IMMUNIZE NO ADMIN: HCPCS | Performed by: PSYCHIATRY & NEUROLOGY

## 2020-02-13 PROCEDURE — G8427 DOCREV CUR MEDS BY ELIG CLIN: HCPCS | Performed by: PSYCHIATRY & NEUROLOGY

## 2020-02-13 PROCEDURE — 99213 OFFICE O/P EST LOW 20 MIN: CPT | Performed by: PSYCHIATRY & NEUROLOGY

## 2020-02-13 RX ORDER — OLANZAPINE 20 MG/1
20 TABLET, ORALLY DISINTEGRATING ORAL NIGHTLY
Status: ON HOLD | COMMUNITY
End: 2020-11-18 | Stop reason: HOSPADM

## 2020-02-13 RX ORDER — ONDANSETRON 8 MG/1
2 TABLET, ORALLY DISINTEGRATING ORAL EVERY 8 HOURS PRN
Status: ON HOLD | COMMUNITY
End: 2020-11-18 | Stop reason: HOSPADM

## 2020-02-13 RX ORDER — DESVENLAFAXINE 100 MG/1
TABLET, EXTENDED RELEASE ORAL DAILY
Status: ON HOLD | COMMUNITY
End: 2020-11-18 | Stop reason: HOSPADM

## 2020-02-13 ASSESSMENT — ENCOUNTER SYMPTOMS
SHORTNESS OF BREATH: 0
CONSTIPATION: 0
PHOTOPHOBIA: 0
ABDOMINAL DISTENTION: 0
SWOLLEN GLANDS: 0
FACIAL SWELLING: 0
SINUS PRESSURE: 0
COLOR CHANGE: 0
EYE ITCHING: 0
BOWEL INCONTINENCE: 0
COUGH: 0
EYE DISCHARGE: 0
CHANGE IN BOWEL HABIT: 0
TROUBLE SWALLOWING: 0
DIARRHEA: 0
EYE REDNESS: 0
VISUAL CHANGE: 0
SORE THROAT: 0
WHEEZING: 0
BACK PAIN: 0
VOMITING: 0
ABDOMINAL PAIN: 0
EYE PAIN: 0
CHOKING: 0
VOICE CHANGE: 0
BLOOD IN STOOL: 0
CHEST TIGHTNESS: 0
NAUSEA: 0
APNEA: 0

## 2020-02-13 NOTE — PROGRESS NOTES
[facility]  Neurology  [address] [place] [address]  KOIVE:[phone]   Fax: [phone]    SUBJECTIVE:     PATIENT ID:  Jessie Kenny is a  RIGHT     HANDED 30 y.o. male. Seizures   This is a new problem. Episode onset: AUGUST 2019   The problem has been unchanged. Pertinent negatives include no abdominal pain, anorexia, arthralgias, change in bowel habit, chest pain, chills, congestion, coughing, diaphoresis, fatigue, fever, headaches, joint swelling, myalgias, nausea, neck pain, numbness, rash, sore throat, swollen glands, urinary symptoms, vertigo, visual change, vomiting or weakness. Nothing aggravates the symptoms. He has tried nothing for the symptoms. The treatment provided no relief. Neurologic Problem   The patient's primary symptoms include memory loss and syncope. The patient's pertinent negatives include no altered mental status, clumsiness, focal sensory loss, focal weakness, loss of balance, near-syncope, slurred speech, visual change or weakness. This is a new problem. Episode onset: AUGUST 2019. The neurological problem developed suddenly. The problem is unchanged. There was no focality noted. Associated symptoms include confusion. Pertinent negatives include no abdominal pain, auditory change, aura, back pain, bladder incontinence, bowel incontinence, chest pain, diaphoresis, dizziness, fatigue, fever, headaches, light-headedness, nausea, neck pain, palpitations, shortness of breath, vertigo or vomiting. Past treatments include nothing. The treatment provided no relief. His past medical history is significant for mood changes and seizures. There is no history of a bleeding disorder, a clotting disorder, a CVA, dementia, head trauma or liver disease. History obtained from  The patient    And   HIS  MOTHER         and other  available   medical  records   were  Also  reviewed.       The  Duration,  Quality,  Severity,  Location,  Timing, Context,  Modifying  Factors   Of   The   Chief   Complaint   And  Present  Illness       Was   Reviewed   In   Chronological   [facility]. PATIENT'S  MAIN  CONCERNS INCLUDE :                       1)    H/O     GTC  SEIZURE  ACTIVITY      WITNESSED    BY                                 HIS  MOTHER     AT   HOME    IN       [place].   2019  . H/O     ANXIETY,     VISUAL  HALLUCINATIONS                                 PRIOR  TO  THE  EPISODE        AND   POST  ICTAL                                  FATIGUE  AND  MEMORY  LOSS                                    2)    H/O    SYNCOPE    IN  MID  AUGUST 2019                            AND  PATIENT  FOUND  ON                             BATH  ROOM  FLOOR    BY   HIS  MOTHER                             3)    H/O    CHRONIC   ANXIETY,    DEPRESSION ,   PTSD                                             -     GETTING  BETTER                               -   BEING  FOLLOWED   BY   HIS   MENTAL  HEALTH  PROFESSIONALS                                -   ON     PRAZOSIN,   REMERON,   TRAZODONE ,   ATIVAN                                                           4)     H/O    CHRONIC   SLEEP  DIFFICULTIES                                             AND  NIGHT  MONTOYA                                           -    GETTING   BETTER                                   5)    H/O     CHRONIC  SMOKING                    PATIENT  AWARE  OF  RISKS  AND                 SIDE  EFFECTS  OF   SMOKING   DISCUSSED. PATIENT   ADVISED   AND  COUNSELED    TO   QUIT  SMOKING. 6)     H/O    ON   MEDICAL  MARIJUANA       SINCE    FEB. 2019                             7)     NO      H/O  ALCOHOL  USAGE.                                     NO     H/O    ILLEGAL   SUBSTANCE  USAGE   CURRENTLY                                8)     H/O      BLACK  OUT  EPISODES -      RESOLVED                                  H/O    MEMORY   PROBLEMS                                           -   IMPROVED                             9)     H/O   NORMAL  BIRTH  AND  DEVELOPMENT                                   NO     H/O    FEBRILE  SEIZURES                          10)     FAMILY    H/O   SEIZURE  DISORDER                                          -   COUSIN                             6)     CO  MORBID  MEDICAL  CONDITIONS                                   BEING  FOLLOWED  BY   HIS  PCP                             12)    HAD    NEURO  DIAGNOSTIC  EVALUATIONS  IN  SEPT. 2019                                        MRI  BRAIN   AND  EEG      SHOWED     NO   SIGNIFICANT                                            ABNORMALITIES                               14)      H/O      INPATIENT      TREATMENT  IN     [place]                                   FOR  HIS  MENTAL  HEALTH  PROBLEMS    IN  JAN. 2020                                      PATIENT  TO  FOLLOW  WITH  HIS  MENTAL  HEALTH                                         PROFESSIONALS                               15)    NO   RECURRENCE  OF  SEIZURE    OR  SEIZURE  LIKE  ACTIVITY. NO    RECURRENCE  OF  SYNCOPE      SINCE   SEPT. 2019                                     MEMORY  PROBLEMS   IMPROVED                             16)       PATIENT  DENIES  ANY  NEW  NEUROLOGICAL   CONCERNS. 17)    PATIENT    NEEDS   TO  BE  STARTED  ON   ANTI  EPILEPTIC  MEDICATIONS                                    IF  THERE  IS  ANY     DEFINITE     RECURRENCE  OF  SEIZURE  ACTIVITY. -   REVIEWED   AND  DISCUSSED  WITH  PATIENT  AND  HIS  MOTHER                                          18)      VARIOUS  RISK   FACTORS   WERE  REVIEWED   AND   DISCUSSED.                                       PATIENT   HAS  MULTIPLE   MEDICAL, NEUROLOGICAL [address] . PATIENT'S   MANAGEMENT  IS  CHALLENGING.                               PRECIPITATING  FACTORS: including  fever/infection, exertion/relaxation, position change, stress, weather change,                        medications/alcohol, time of day/darkness/light  Are  absent                                                              MODIFYING  FACTORS:  fever/infection, exertion/relaxation, position change, stress, weather change,                        medications/alcohol, time of day/darkness/light  Are  absent             Patient   Indicates   The  Presence   And  The  Absence  Of  The  Following    Associated  And   Additional  Neurological    Symptoms:                                Balance  And coordination   problems  absent           Gait problems     absent            Headaches      absent              Migraines           absent           Memory problemspresent              Confusion        absent            Paresthesia   numbness          absent           Seizures  And  Starring  Episodes           present           Syncope,  Near  syncopal episodes         present           Speech   problems           absent             Swallowing   Problems      absent            Dizziness,  Light headedness           absent              Vertigo        absent             Generalized   Weakness    absent              focal  Weakness     absent             Tremors         absent              Sleep  Problems     Present               History  Of   Recent  Head  Injury     absent             History  Of   Recent  TIA     absent             History  Of   Recent    Stroke     absent             Neck  Pain   and   Neck muscle  Spasms  absent               Radiating  down   And   Weakness           absent            Lower back   Pain  And     Spasms  absent              Radiating    Down   And   Weakness          absent H/OFALLS        absent               History  Of   Visual  Symptoms    absent                  Associated   Diplopia       absent                                               Also   Additional   Symptoms   Present    As  Documented    In   The   detailed                  Review  Of  Systems   And    Please   Refer   To    Them for   Additional    Information. Any components  That are either  Unobtainable  Or  Limited  In   HPI, ROS  And/or PFSH   Are                   Due   ToPatient's  Medical  Problems,  Clinical  Condition   and/or lack of                                other    Alternate   resources. RECORDS   REVIEWED:    historical medical records       INFORMATION   REVIEWED:     MEDICAL   HISTORY,SURGICAL   HISTORY,     MEDICATIONS   LIST,   ALLERGIES AND  DRUG  INTOLERANCES,       FAMILY   HISTORY,  SOCIAL  HISTORY,      PROBLEM  LIST   FOR  PATIENT  CARE   COORDINATION      Past Medical History:   Diagnosis Date    Acid reflux     Anxiety     Appendicitis     when 11years old    Diverticulitis     History of acute renal failure     due to dehydration    History of stomach ulcers     Irritable bowel syndrome     Ulcer          Past Surgical History:   Procedure Laterality Date    APPENDECTOMY      COLONOSCOPY      UPPER GASTROINTESTINAL ENDOSCOPY      WISDOM TOOTH EXTRACTION           Current Outpatient Medications   Medication Sig Dispense Refill    medical marijuana Take by mouth as needed.       OLANZapine zydis (ZYPREXA) 20 MG disintegrating tablet Take 20 mg by mouth nightly      desvenlafaxine succinate (PRISTIQ) 100 MG TB24 extended release tablet Take by mouth daily      ondansetron (ZOFRAN-ODT) 8 MG TBDP disintegrating tablet Place 2 mg under the tongue every 8 hours as needed for Nausea or Vomiting      metoprolol succinate (TOPROL XL) 25 MG extended release tablet Take 1 tablet by mouth daily 30 tablet 5    LORazepam (ATIVAN) 1 MG tablet Take 1 mg by mouth every 6 hours as needed for Anxiety.  hydrOXYzine (ATARAX) 50 MG tablet Take 1 tablet by mouth 3 times daily as needed for Itching (Patient taking differently: Take 50 mg by mouth daily ) 90 tablet 5    Cholecalciferol (VITAMIN D3) 2000 units CAPS Take 2 capsules by mouth daily 60 capsule 5    prazosin (MINIPRESS) 2 MG capsule Take 1 capsule by mouth nightly (Patient taking differently: Take 2 mg by mouth nightly Indications: 1MG EVERY MORNING ) 30 capsule 0    sucralfate (CARAFATE) 1 GM tablet Take 1 tablet by mouth 4 times daily 120 tablet 5    dicyclomine (BENTYL) 10 MG capsule Take 1 capsule by mouth 4 times daily 120 capsule 5    pantoprazole (PROTONIX) 20 MG tablet TAKE 1 TABLET BY MOUTH TWICE DAILY 60 tablet 5    Ergocalciferol (VITAMIN D2 PO) Take 1 capsule by mouth once a week Indications: 50,00 IU      fluticasone (FLONASE) 50 MCG/ACT nasal spray 1 spray by Each Nare route daily 1 Bottle 3    mirtazapine (REMERON) 30 MG tablet Take 1 tablet by mouth nightly 30 tablet 0     No current facility-administered medications for this visit.           Allergies   Allergen Reactions    Haldol [Haloperidol]      EPS    Zoloft [Sertraline Hcl] Other (See Comments)     nightmares         Family History   Problem Relation Age of Onset    Kidney Disease Mother     Other Mother         Gastroesophageal reflux disease    Allergies Brother     Cancer Maternal Uncle         Great-uncle    Crohn's Disease Maternal Uncle     Other Father         drug overdose         Social History     Socioeconomic History    Marital status: Single     Spouse name: Not on file    Number of children: Not on file    Years of education: Not on file    Highest education level: Not on file   Occupational History     Employer: [employer]   Social Needs    Financial resource strain: Not on file    Food insecurity:     Worry: Not on file     Inability: Not on file    Transportation needs:     Medical: Not on file Non-medical: Not on file   Tobacco Use    Smoking status: Former Smoker     Packs/day: 0.50     Types: Cigarettes     Last attempt to quit: 2019     Years since quittin.7    Smokeless tobacco: Never Used    Tobacco comment: rashmi rrt 2019   Substance and Sexual Activity    Alcohol use:  Yes     Alcohol/week: 2.0 standard drinks     Types: 2 Cans of beer per week     Comment: stopped drinking 7 months ago - 2018    Drug use: Yes     Frequency: 21.0 times per week     Types: Marijuana     Comment: 19 reports using marijuana 3 times a day, states has a medical card    Sexual activity: Not Currently     Partners: Female   Lifestyle    Physical activity:     Days per week: Not on file     Minutes per session: Not on file    Stress: Not on file   Relationships    Social connections:     Talks on phone: Not on file     Gets together: Not on file     Attends Jainism service: Not on file     Active member of club or organization: Not on file     Attends meetings of clubs or organizations: Not on file     Relationship status: Not on file    Intimate partner violence:     Fear of current or ex partner: Not on file     Emotionally abused: Not on file     Physically abused: Not on file     Forced sexual activity: Not on file   Other Topics Concern    Not on file   Social History Narrative    Not on file       Vitals:    20 1554   BP: 120/76   Pulse: 86   SpO2: 97%         Wt Readings from Last 3 Encounters:   20 210 lb (95.3 kg)   19 202 lb (91.6 kg)   19 190 lb (86.2 kg)         BP Readings from Last 3 Encounters:   20 120/76   19 112/66   19 (!) 140/98       Hematology and Coagulation  Lab Results   Component Value Date    WBC 17.3 2019    RBC 5.15 2019    HGB 15.4 2019    HCT 45.1 2019    MCV 87.5 2019    MCH 29.8 2019    MCHC 34.1 2019    RDW 14.6 2019     2019    MPV 7.4 2019 problem, joint swelling, myalgias, neck pain and neck stiffness. Skin: Negative for color change, pallor, rash and wound. Allergic/Immunologic: Negative for environmental allergies, food allergies and immunocompromised state. Neurological: Positive for seizures and syncope. Negative for dizziness, vertigo, tremors, focal weakness, facial asymmetry, speech difficulty, weakness, light-headedness, numbness, headaches and loss of balance. Hematological: Negative for adenopathy. Does not bruise/bleed easily. Psychiatric/Behavioral: Positive for confusion, decreased concentration, memory loss and sleep disturbance. Negative for agitation, behavioral problems, dysphoric mood, hallucinations, self-injury and suicidal ideas. The patient is nervous/anxious. The patient is not hyperactive. OBJECTIVE:    Physical Exam  Constitutional:       Appearance: He is well-developed. HENT:      Head: Normocephalic and atraumatic. No raccoon eyes or Kern's sign. Right Ear: External ear normal.      Left Ear: External ear normal.      Nose: Nose normal.   Eyes:      Conjunctiva/sclera: Conjunctivae normal.   Neck:      Musculoskeletal: Normal range of motion and neck supple. Normal range of motion. No neck rigidity or muscular tenderness. Thyroid: No thyroid mass or thyromegaly. Vascular: No carotid bruit. Trachea: No tracheal deviation. Meningeal: Brudzinski's sign and Kernig's sign absent. Cardiovascular:      Rate and Rhythm: Normal rate and regular rhythm. Pulmonary:      Effort: Pulmonary effort is normal.   Musculoskeletal: Normal range of motion. General: No tenderness. Skin:     General: Skin is warm. Coloration: Skin is not pale. Findings: No erythema or rash. Nails: There is no clubbing. Psychiatric:         Attention and Perception: He is attentive. Mood and Affect: Mood is anxious and depressed. Affect is blunt.  Affect is not labile, angry MRI of the head/brain was performed without and   with the administration of intravenous contrast.       COMPARISON:   None.       HISTORY:   ORDERING SYSTEM PROVIDED HISTORY: Post traumatic stress disorder   TECHNOLOGIST PROVIDED HISTORY:   seizure like activity       FINDINGS:   INTRACRANIAL STRUCTURES/VENTRICLES:  Ventricles are normal in size and   position.  Sulci are prominent for the patient's age.  No abnormal increased   T2, FLAIR or diffusion signal is noted.  No abnormal enhancement is noted. There is no extra-axial collection.  Pituitary is normal in appearance. Optic chiasm is normal in appearance       ORBITS: No acute orbital abnormality is noted       SINUSES: The visualized paranasal sinuses and mastoid air cells are well   aerated.       BONES/SOFT TISSUES: Small cervical nodes are noted           Impression   No acute intracranial abnormality           VISITING DIAGNOSIS:      ICD-10-CM    1. Seizure-like activity (HCC) R56.9    2. Hyperlipidemia, unspecified hyperlipidemia type E78.5    3. Dysthymia F34.1    4. Post traumatic stress disorder F43.10    5. Chronic diarrhea K52.9    6. Irritable bowel syndrome with both constipation and diarrhea K58.2    7. Anxiety F41.9    8. MDD (major depressive disorder), recurrent, severe, with psychosis ([facility] [address] F33.3    9. Recurrent major depressive disorder, in remission ([facility] [address] F33.40    10. Generalized anxiety disorder with panic attacks F41.1     F41.0    11. Drug abuse ([facility] [address] F19.10               CONCERNS   &   INCREASED   RISK   FOR             *  SEIZURE  ACTIVITY,  EPILEPSY ,        *  SYNCOPE        *   COGNITIVE  &   MEMORY PROBLEMS                    VARIOUS  RISK   FACTORS   WERE  REVIEWED   AND   DISCUSSED. *  PATIENT   HAS  MULTIPLE   MEDICAL, NEUROLOGICAL                        AND   MENTAL HEALTH   PROBLEMS . PATIENT'S   MANAGEMENT  IS  CHALLENGING.             PLAN:                         Johana Dias  Of  The  Diagnoses,  The feelsmell or funny  Feeling  In  Your  Stomach,  Which is  Called   Aura. TIPS  TO  REDUCE/ PREVENT  SEIZURES           1. Take  Your  Anti seizure  Medications   As   Recommended. 2.  Get   Enough   Sleep. Sleep  Deprivation   Can  Trigger  A  Seizure. 3.  If   You   Have  A fever,  Treat  ItAt  Once,  And  Contact   Your  Primary  Care Providers. 4. Avoid   Alcohol. 5. Avoid  Flashing  Lights,  Loud  Noises and  TV  And  Video  Games,             As   These  May  Trigger   Your  Seizures       6. Control  Your  Stress  And   Have  Adequate  Rest.       7.   If  You  Feel  A  Seizure  Coming On :             A) warn people  Who  Are  With  You             B)  Make  Sure  There  Are  No  Sharp or  Hard  Objects  Around you. C)  Lay down  On  Your  Side  And  Relax. *TO  MAINTAIN  REGULAR  SLEEP  WAKE  CYCLES. *   TO  HAVE  ADEQUATE  REST  AND   SLEEP    HOURS.            *    AVOID  ANY USAGE OF    TOBACCO,              EXCESSIVE  ALCOHOL  AND   ILLEGAL   SUBSTANCES        *   Compliance   With  Medications   And  Instructions        * CURRENTLY    TOLERATING  THE  PRESCRIBED   MEDICATIONS. WITHOUT  ANY  SIGNIFICANT  SIDE  EFFECTS   &  GETTING BENEFIT.           *  May   Use  Pill  Box,    If  Needed      *  MEDICATIONS TO AVOID:    WELLBUTRIN,  ULTRAM         *    Prophylactic  Use   Of     Vitamin   B   Complex,  Folic  Acid,    Vitamin  B12    Multivitamin,       Calcium  With  magnesium  And  Vit D    Supplementations   Over  The  Counter  Discussed              *  EVALUATIONS  AND  FOLLOW UP:    IF  PATIENT  IS  WILLING                                           *CARDIOLOGY              * EPILEPSY  MONITORING   UNIT                   *  GASTROENTEROLOGY                           *    NO   RECURRENCE  OF  SEIZURE OR  SEIZURE  LIKE  ACTIVITY. NO    RECURRENCE  OF  SYNCOPE      SINCE   SEPT. 2019                                     MEMORY  PROBLEMS   IMPROVED                            *   PATIENT  DENIES  ANY  NEW  NEUROLOGICAL   CONCERNS. *             PATIENT    NEEDS   TO  BE  STARTED  ON   ANTI  EPILEPTIC  MEDICATIONS                                    IF  THERE  IS  ANY     DEFINITE     RECURRENCE  OF  SEIZURE  ACTIVITY. -   REVIEWED   AND  DISCUSSED  WITH  PATIENT  AND  HIS  MOTHER                   *PATIENT   TO  FOLLOW  UP  WITH   PRIMARY  CARE         OTHER  CONSULTANTS  AS  BEFORE.           *TO  FOLLOW  WITH   MENTAL  HEALTH  PROFESSIONALS ,  INCLUDING            PSYCHOLOGICAL  COUNSELING   AND  PSYCHIATRIC  EVALUTIONS,                      *  Maintain   Healthy  Life Style    With   Periodic  Monitoring  Of      Any  Medical  Conditions  Including   Elevated  Blood  Pressure,  Lipid  Profile,     Blood  Sugar levels  AndHeart  Disease. *   Period   Screening  For  Cancers  Involving  Breast,  Colon,    lungs  And  Other  Organs  As  Applicable,  In consultation   With  Your  Primary Care Providers. *Second  Neurological  Opinion  And  Evaluations  In  [facility]  Setting  If  Patient  Is  Interested. * Please   Contact   Neurology  Clinic   Early   If   Are  Any  New  Neurological   And  Any neurological  Concerns.                   *  If  The  Patient remains  Neurologically  Stable   Return   To  [facility] Neurology Department   IN      3  -  6        MONTHS  TIME   FOR  FURTHER              FOLLOW UP.                       *   The  Neurological   Findings,  Possible  Diagnosis,  Differential diagnoses                    And  Options  For    Further   Investigations                   And  management you take.  How can you care for yourself at home?  Do not eat too much sugar, fat, or fast foods. You can still have dessert and treats nowand then. The goal is moderation.  Start small to improve your eating habits. Pay attention to portion sizes, drink less juice and soda pop, and eat more fruits and vegetables.  Eat a healthy amount of food. A 3-ounce serving of meat, for example, is about the size of a deck of cards. Fill the rest of your plate with vegetables and whole grains.  Limit theamount of soda and sports drinks you have every day. Drink more water when you are thirsty.  Eat at least 5 servings of fruits and vegetables every day. It may seem like a lot, but it is not hard to reach this goal. Aserving or helping is 1 piece of fruit, 1 cup of vegetables, or 2 cups of leafy, raw vegetables. Have an apple or some carrot sticks as an afternoon snack instead of a candy bar. Try to have fruits and/or vegetables at everymeal.   Make exercise part of your daily routine. You may want to start with simple activities, such as walking, bicycling, or slow swimming. Try gus active 30 to 60 minutes every day. You do not need to do all 30 to 60 minutes all at once. For example, you can exercise 3 times a day for 10 or 20 minutes. Moderate exercise is safe for most people, but it is always agood idea to talk to your doctor before starting an exercise program.   Keep moving. [place] the lawn, work in the garden, or [employer]. Take the stairs instead of the elevator at work.  If you smoke, quit. Peoplewho smoke have an increased risk for heart attack, stroke, cancer, and other lung illnesses. Quitting is hard, but there are ways to boost your chance of quitting tobacco for good.  Use nicotine gum, patches, or lozenges.  Ask your doctor about stop-smoking programs and medicines.  Keep trying.    In addition to reducing your risk of diseases in the future, you will notice some benefits soon after you

## 2020-02-13 NOTE — PATIENT INSTRUCTIONS
[facility] NEUROLOGY    Due to the complex nature of our neurological testing, It is the policy of the Neurology Department not to release the results of your testing over the phone. Once all testing is completed and we have all the results back, Dr. Nilam Vasquez will then personally go over all the results with you and answer any questions that you may have during a follow up appointment. If you are unable to keep this appointment, please notify the [address] @ [phone], as soon as possible. Please bring your prescription bottles to all appointments. *   SEIZURE  PRECAUTIONS. A)  Avoid  Working  At   THE [employer]. B)  Avoid  Working  With  Heavy machinery. C)  Avoid   Swimming,  Climbing  A  Ladder   Unattended. D)  Avoid   Driving   If  You   Have  A  Seizure. E)  Must   Be  Seizure  Free   For  At   Least   6 months,  Before   You  Can drive. F) Some times  Your  May  Feel  Seizure coming  Before  It  Begins. You  May feel             Strange smell or funny  Feeling  In  Your  Stomach,  Which is  Called   Aura. TIPS  TO  REDUCE/ PREVENT  SEIZURES         1. Take  Your  Anti seizure  Medications   As   Recommended. 2. Get   Enough   Sleep. Sleep  Deprivation   Can  Trigger  A  Seizure. 3. If   You   Have  A fever,  Treat  It  At  Once,  And  Contact   Your  Primary  Care Providers. 4. Avoid   Alcohol. 5. Avoid  Flashing  Lights,  Loud  Noises and  TV  And  Video  Games,           As   These  May  Trigger   Your  Seizures       6.   Control  Your  Stress  And   Have  Adequate  Rest.       7.   If  You  Feel  A  Seizure  Coming   On :           JONELLE) uyen

## 2020-03-27 ENCOUNTER — TELEPHONE (OUTPATIENT)
Dept: FAMILY MEDICINE CLINIC | Age: 30
End: 2020-03-27

## 2020-03-27 RX ORDER — ONDANSETRON 8 MG/1
2 TABLET, ORALLY DISINTEGRATING ORAL EVERY 8 HOURS PRN
Status: CANCELLED | OUTPATIENT
Start: 2020-03-27

## 2020-03-27 NOTE — TELEPHONE ENCOUNTER
Dr. Joy Friday has not filled the. He has received from the ER. Do you want to fill or have him do an Evisit, video visit or UC.

## 2020-03-27 NOTE — TELEPHONE ENCOUNTER
I will fill short-term, and then he can f/u with Dr. Jake Yañez regarding this. Which does he want refilled - the ODT or regular, and which dose?

## 2020-03-27 NOTE — TELEPHONE ENCOUNTER
Pt mother Shaunna Carolyn calling requesting a refill for Zofran be sent to [facility].

## 2020-03-30 RX ORDER — ONDANSETRON 4 MG/1
4 TABLET, FILM COATED ORAL EVERY 8 HOURS PRN
Qty: 30 TABLET | Refills: 5 | Status: SHIPPED | OUTPATIENT
Start: 2020-03-30 | End: 2021-03-15

## 2020-03-30 NOTE — TELEPHONE ENCOUNTER
I was only here until noon Friday and this was sent back to me. I called today to apologize and he stated he was fine he just wanted it for when he eats. He stated he vomits when he eats.  I explained I would send this to Dr. Amelia Ceron then since this would be up to her if she wants you to take this regularly

## 2020-03-30 NOTE — TELEPHONE ENCOUNTER
I sent it in for him. He really needs to stop using marijuana because I think that is a huge cause of his vomiting.

## 2020-09-08 ENCOUNTER — TELEPHONE (OUTPATIENT)
Dept: FAMILY MEDICINE CLINIC | Age: 30
End: 2020-09-08

## 2020-09-09 NOTE — TELEPHONE ENCOUNTER
[facility] Transitions Initial Follow Up Call    Outreach made within 2 business days of discharge: Yes    Patient: Alis Goodson Patient : 1990   MRN: [id number]  Reason for Admission: over active thyroid  Discharge Date: 19       Spoke with: attempted #1 2020 @ 11 am; attempted to call pt, no answer an vm not set up    Discharge department/facility: [facility] Interactive Patient Contact:  Was patient able to fill all prescriptions: Yes  Was patient instructed to bring all medications to the follow-up visit: Yes  Is patient taking all medications as directed in the discharge summary?  Yes  Does patient understand their discharge instructions: Yes  Does patient have questions or concerns that need addressed prior to 7-14 day follow up office visit: no    Scheduled appointment with PCP within 7-14 days    Follow Up  Future Appointments   Date Time Provider Deo Malcolm   9/15/2020  3:00 PM Gaylon Eisenmenger, MD [facility]       Jay Lipscomb LPN

## 2020-09-09 NOTE — TELEPHONE ENCOUNTER
[facility] Trans[facility] Initial Follow Up Call    Outreach made within 2 business days of discharge: Yes    Patient: Hollie Etienne Patient : 1990   MRN: [id number]  Reason for Admission: overactive thyroid     Discharge Date: 2020       Spoke with: patient    Discharge department/facility: [facility]    [facility] Interactive Patient Contact:  Was patient able to fill all prescriptions: Yes  Was patient instructed to bring all medications to the follow-up visit: Yes  Is patient taking all medications as directed in the discharge summary?  Yes  Does patient understand their discharge instructions: Yes  Does patient have questions or concerns that need addressed prior to 7-14 day follow up office visit: no    Scheduled appointment with PCP within 7-14 days    Follow Up  Future Appointments   Date Time Provider Deo Malcolm   9/15/2020  3:00 PM MD VU Gardiner [facility]       Sarah Black [facility]

## 2020-09-15 ENCOUNTER — OFFICE VISIT (OUTPATIENT)
Dept: FAMILY MEDICINE CLINIC | Age: 30
End: 2020-09-15
Payer: MEDICARE

## 2020-09-15 VITALS
HEART RATE: 88 BPM | BODY MASS INDEX: 29.2 KG/M2 | RESPIRATION RATE: 18 BRPM | SYSTOLIC BLOOD PRESSURE: 100 MMHG | TEMPERATURE: 96.4 F | DIASTOLIC BLOOD PRESSURE: 84 MMHG | HEIGHT: 70 IN | OXYGEN SATURATION: 97 % | WEIGHT: 204 LBS

## 2020-09-15 PROBLEM — R11.10 VOMITING AND DIARRHEA: Status: ACTIVE | Noted: 2018-03-16

## 2020-09-15 PROBLEM — K21.9 GASTROESOPHAGEAL REFLUX DISEASE WITHOUT ESOPHAGITIS: Status: ACTIVE | Noted: 2020-07-16

## 2020-09-15 PROBLEM — T51.91XA ACCIDENTAL POISONING BY ALCOHOL: Status: ACTIVE | Noted: 2018-07-01

## 2020-09-15 PROBLEM — R79.89 ABNORMAL THYROID BLOOD TEST: Status: ACTIVE | Noted: 2020-09-04

## 2020-09-15 PROBLEM — K58.9 IRRITABLE BOWEL SYNDROME: Status: ACTIVE | Noted: 2018-09-24

## 2020-09-15 PROBLEM — R07.9 CHEST PAIN, UNSPECIFIED: Status: ACTIVE | Noted: 2018-04-12

## 2020-09-15 PROBLEM — R06.02 SHORTNESS OF BREATH: Status: ACTIVE | Noted: 2018-03-16

## 2020-09-15 PROBLEM — F17.219 CIGARETTE NICOTINE DEPENDENCE WITH NICOTINE-INDUCED DISORDER: Status: ACTIVE | Noted: 2020-07-16

## 2020-09-15 PROBLEM — R19.7 VOMITING AND DIARRHEA: Status: ACTIVE | Noted: 2018-03-16

## 2020-09-15 PROBLEM — F19.10 DRUG ABUSE (HCC): Status: ACTIVE | Noted: 2018-07-01

## 2020-09-15 PROBLEM — Z87.891 PERSONAL HISTORY OF NICOTINE DEPENDENCE: Status: ACTIVE | Noted: 2018-03-16

## 2020-09-15 PROBLEM — R10.84 GENERALIZED ABDOMINAL PAIN: Status: ACTIVE | Noted: 2018-03-16

## 2020-09-15 PROBLEM — K26.9 DUODENAL ULCER: Status: ACTIVE | Noted: 2018-07-01

## 2020-09-15 PROBLEM — E78.2 MIXED HYPERLIPIDEMIA: Status: ACTIVE | Noted: 2018-08-25

## 2020-09-15 PROBLEM — E05.90 HYPERTHYROIDISM: Status: ACTIVE | Noted: 2020-09-05

## 2020-09-15 PROBLEM — F12.188 CANNABIS HYPEREMESIS SYNDROME CONCURRENT WITH AND DUE TO CANNABIS ABUSE (HCC): Status: ACTIVE | Noted: 2020-09-15

## 2020-09-15 PROCEDURE — 1111F DSCHRG MED/CURRENT MED MERGE: CPT | Performed by: FAMILY MEDICINE

## 2020-09-15 PROCEDURE — 99214 OFFICE O/P EST MOD 30 MIN: CPT | Performed by: FAMILY MEDICINE

## 2020-09-15 RX ORDER — PERPHENAZINE 4 MG/1
1 TABLET, FILM COATED ORAL 3 TIMES DAILY
Status: ON HOLD | COMMUNITY
Start: 2020-07-07 | End: 2020-11-18 | Stop reason: HOSPADM

## 2020-09-15 RX ORDER — PROPRANOLOL HYDROCHLORIDE 10 MG/1
10 TABLET ORAL 3 TIMES DAILY
Status: ON HOLD | COMMUNITY
Start: 2020-07-20 | End: 2020-11-18 | Stop reason: HOSPADM

## 2020-09-15 RX ORDER — METHIMAZOLE 5 MG/1
5 TABLET ORAL 3 TIMES DAILY
Qty: 90 TABLET | Refills: 3 | Status: SHIPPED | OUTPATIENT
Start: 2020-09-15 | End: 2020-10-15

## 2020-09-15 RX ORDER — BENZTROPINE MESYLATE 2 MG/1
1 TABLET ORAL 2 TIMES DAILY
Status: ON HOLD | COMMUNITY
Start: 2020-09-08 | End: 2020-11-18 | Stop reason: HOSPADM

## 2020-09-15 RX ORDER — CALCIUM CARBONATE 200(500)MG
200 TABLET,CHEWABLE ORAL DAILY
COMMUNITY
Start: 2020-07-20 | End: 2021-09-23

## 2020-09-15 RX ORDER — ATORVASTATIN CALCIUM 20 MG/1
20 TABLET, FILM COATED ORAL NIGHTLY
Status: ON HOLD | COMMUNITY
Start: 2020-07-20 | End: 2020-11-18 | Stop reason: HOSPADM

## 2020-09-15 RX ORDER — TRAZODONE HYDROCHLORIDE 50 MG/1
50 TABLET ORAL NIGHTLY PRN
COMMUNITY
Start: 2020-09-08 | End: 2020-11-18 | Stop reason: HOSPADM

## 2020-09-15 RX ORDER — PERPHENAZINE 8 MG
8 TABLET ORAL 2 TIMES DAILY
Status: ON HOLD | COMMUNITY
Start: 2020-09-08 | End: 2020-11-18 | Stop reason: HOSPADM

## 2020-09-15 RX ORDER — ZIPRASIDONE HYDROCHLORIDE 40 MG/1
40 CAPSULE ORAL 2 TIMES DAILY WITH MEALS
Status: ON HOLD | COMMUNITY
Start: 2020-09-08 | End: 2020-11-18 | Stop reason: HOSPADM

## 2020-09-15 RX ORDER — DIPHENHYDRAMINE HCL 25 MG/1
2 CAPSULE ORAL EVERY 4 HOURS PRN
Status: ON HOLD | COMMUNITY
Start: 2020-07-23 | End: 2020-11-18 | Stop reason: HOSPADM

## 2020-09-15 ASSESSMENT — ENCOUNTER SYMPTOMS
CHEST TIGHTNESS: 0
SHORTNESS OF BREATH: 0
WHEEZING: 0
COUGH: 0

## 2020-09-15 NOTE — PROGRESS NOTES
Post-Discharge Transitional Care Management Services or Hospital Follow Up      Belinda Granado   YOB: 1990    Date of Office Visit:  9/15/2020  Date of Hospital Admission: 12/13/19  Date of Hospital Discharge: 12/13/19  Readmission Risk Score(high >=14%.  Medium >=10%):No data recorded    Care management risk score Rising risk (score 2-5) and Complex Care (Scores >=6): 8     Non face to face  following discharge, date last encounter closed (first attempt may have been earlier): 9/10/2020  5:23 PM 9/10/2020  5:23 PM    Call initiated 2 business days of discharge: Yes     Patient Active Problem List   Diagnosis    Clostridium difficile colitis    Elevated white blood cell count, unspecified    Nausea and vomiting    Generalized abdominal pain    Chronic diarrhea    Irritable bowel syndrome    Severe episode of recurrent major depressive disorder, without psychotic features ([address]    Post traumatic stress disorder    Epigastric pain    Suicidal ideation    MDD (major depressive disorder)    Hypokalemia    Mixed hyperlipidemia    Generalized anxiety disorder with panic attacks    Gastro-esophageal reflux disease with esophagitis    Dysthymia    Drug abuse ([address]    Anxiety    Acute kidney injury ([address]    MDD (major depressive disorder), recurrent, severe, with psychosis ([address]    Hypercalcemia    Severe major depression ([address]    Tachycardia    Vomiting and diarrhea    Duodenal ulcer    Personal history of nicotine dependence    Chest pain, unspecified    Shortness of breath    Abnormal thyroid blood test    Accidental poisoning by alcohol    Cigarette nicotine dependence with nicotine-induced disorder    Gastroesophageal reflux disease without esophagitis    Hyperthyroidism    Cannabis hyperemesis syndrome concurrent with and due to cannabis abuse (HCC)       Allergies   Allergen Reactions    Haldol [Haloperidol]      EPS    Zoloft [Sertraline Hcl] Other (See Comments) nightmares       Medications listed as ordered at the time of discharge from [facility] [place]   Zonia Delgado   Home Medication Instructions [id number]    Printed on:09/15/20 7254   Medication Information                      atorvastatin (LIPITOR) 20 MG tablet  Take 20 mg by mouth nightly             benztropine (COGENTIN) 2 MG tablet  Take 1 tablet by mouth 2 times daily             calcium carbonate (TUMS) 500 MG chewable tablet  Take 200 mg by mouth daily             Cholecalciferol (VITAMIN D3) 2000 units CAPS  Take 2 capsules by mouth daily             desvenlafaxine succinate (PRISTIQ) 100 MG TB24 extended release tablet  Take by mouth daily             dicyclomine (BENTYL) 10 MG capsule  Take 1 capsule by mouth 4 times daily             EQ ALLERGY RELIEF 25 MG capsule  Take 2 capsules by mouth every 4 hours as needed for Allergies             Ergocalciferol (VITAMIN D2 PO)  Take 1 capsule by mouth once a week Indications: 50,00 IU             fluticasone (FLONASE) 50 MCG/ACT nasal spray  1 spray by Each Nare route daily             hydrOXYzine (ATARAX) 50 MG tablet  Take 1 tablet by mouth 3 times daily as needed for Itching             LORazepam (ATIVAN) 1 MG tablet  Take 1 mg by mouth every 6 hours as needed for Anxiety. medical marijuana  Take by mouth as needed.              methIMAzole (TAPAZOLE) 5 MG tablet  Take 1 tablet by mouth 3 times daily             mirtazapine (REMERON) 30 MG tablet  Take 1 tablet by mouth nightly             OLANZapine zydis (ZYPREXA) 20 MG disintegrating tablet  Take 20 mg by mouth nightly             ondansetron (ZOFRAN) 4 MG tablet  Take 1 tablet by mouth every 8 hours as needed for Nausea or Vomiting             ondansetron (ZOFRAN-ODT) 8 MG TBDP disintegrating tablet  Place 2 mg under the tongue every 8 hours as needed for Nausea or Vomiting             pantoprazole (PROTONIX) 20 MG tablet  TAKE 1 TABLET BY MOUTH TWICE DAILY             perphenazine 4 MG tablet  Take 1 tablet by mouth 3 times daily             perphenazine 8 MG tablet  Take 8 mg by mouth 2 times daily             propranolol (INDERAL) 10 MG tablet  Take 10 mg by mouth 3 times daily             sucralfate (CARAFATE) 1 GM tablet  Take 1 tablet by mouth 4 times daily             traZODone (DESYREL) 50 MG tablet  Take 50 mg by mouth nightly as needed             ziprasidone (GEODON) 40 MG capsule  Take 40 mg by mouth 2 times daily (with meals)                   Medications marked \"taking\" at this time  Outpatient Medications Marked as Taking for the 9/15/20 encounter (Office Visit) with Theo Roque MD   Medication Sig Dispense Refill    propranolol (INDERAL) 10 MG tablet Take 10 mg by mouth 3 times daily      perphenazine 4 MG tablet Take 1 tablet by mouth 3 times daily      perphenazine 8 MG tablet Take 8 mg by mouth 2 times daily      calcium carbonate (TUMS) 500 MG chewable tablet Take 200 mg by mouth daily      benztropine (COGENTIN) 2 MG tablet Take 1 tablet by mouth 2 times daily      traZODone (DESYREL) 50 MG tablet Take 50 mg by mouth nightly as needed      ziprasidone (GEODON) 40 MG capsule Take 40 mg by mouth 2 times daily (with meals)      EQ ALLERGY RELIEF 25 MG capsule Take 2 capsules by mouth every 4 hours as needed for Allergies      atorvastatin (LIPITOR) 20 MG tablet Take 20 mg by mouth nightly      methIMAzole (TAPAZOLE) 5 MG tablet Take 1 tablet by mouth 3 times daily 90 tablet 3    ondansetron (ZOFRAN) 4 MG tablet Take 1 tablet by mouth every 8 hours as needed for Nausea or Vomiting 30 tablet 5    medical marijuana Take by mouth as needed.       OLANZapine zydis (ZYPREXA) 20 MG disintegrating tablet Take 20 mg by mouth nightly      desvenlafaxine succinate (PRISTIQ) 100 MG TB24 extended release tablet Take by mouth daily      ondansetron (ZOFRAN-ODT) 8 MG TBDP disintegrating tablet Place 2 mg under the tongue every 8 hours as needed for Nausea or Vomiting      LORazepam (ATIVAN) 1 MG tablet Take 1 mg by mouth every 6 hours as needed for Anxiety.  dicyclomine (BENTYL) 10 MG capsule Take 1 capsule by mouth 4 times daily 120 capsule 5    hydrOXYzine (ATARAX) 50 MG tablet Take 1 tablet by mouth 3 times daily as needed for Itching (Patient taking differently: Take 50 mg by mouth daily ) 90 tablet 5    Cholecalciferol (VITAMIN D3) 2000 units CAPS Take 2 capsules by mouth daily 60 capsule 5    pantoprazole (PROTONIX) 20 MG tablet TAKE 1 TABLET BY MOUTH TWICE DAILY 60 tablet 5    Ergocalciferol (VITAMIN D2 PO) Take 1 capsule by mouth once a week Indications: 50,00 IU      fluticasone (FLONASE) 50 MCG/ACT nasal spray 1 spray by Each Nare route daily 1 Bottle 3    sucralfate (CARAFATE) 1 GM tablet Take 1 tablet by mouth 4 times daily 120 tablet 5    mirtazapine (REMERON) 30 MG tablet Take 1 tablet by mouth nightly 30 tablet 0        Medications patient taking as of now reconciled against medications ordered at time of hospital discharge: Yes    Chief Complaint   Patient presents with    Follow-Up from Hospital     discuss thyroid, gerd and why taking allergy med    Other     feeling cold alot , night sweats    Anxiety     panic attacks       HPI Here today for a hospital follow up. He has been more cold recently, he is not having any hair loss. No constipation. He has some issues with palpitations during his panic attacks. He has not had any significant weight changes recently. His appetite has been the same as always. He had his thyroid checked while he was in [facility] and his TSH was low and he has not yet been started on medication because they want me to address it. He has noticed mild pain on swallowing and he has some pain in his throat. He has been having some issues with ED recently. He suspects it is due to his medications, but it is also probably due to his anxiety.  His girlfriend is trying to help him through it but she is not sure what to suggest. He claims that he has not been smoking marijuana anymore because he has been told enough times that it is causing his hyperemesis and he is finally starting to believe it. Inpatient course: Discharge summary reviewed- see chart. Interval history/Current status: stable    Review of Systems   Constitutional: Negative for activity change, appetite change, chills, fatigue and fever. Eyes: Negative for visual disturbance. Respiratory: Negative for cough, chest tightness, shortness of breath and wheezing. Cardiovascular: Negative for chest pain, palpitations and leg swelling. Endocrine: Negative for cold intolerance and heat intolerance. Genitourinary: Negative for difficulty urinating. Skin: Negative for rash. Neurological: Negative for dizziness, syncope, weakness, light-headedness and headaches. Psychiatric/Behavioral: Positive for sleep disturbance. Negative for dysphoric mood. The patient is nervous/anxious. Vitals:    09/15/20 1516   BP: 100/84   Site: Left Upper Arm   Position: Sitting   Cuff Size: Large Adult   Pulse: 88   Resp: 18   Temp: 96.4 °F (35.8 °C)   SpO2: 97%   Weight: 204 lb (92.5 kg)   Height: 5' 10\" (1.778 m)     Body mass index is 29.27 kg/m². Wt Readings from Last 3 Encounters:   09/15/20 204 lb (92.5 kg)   02/13/20 210 lb (95.3 kg)   12/13/19 202 lb (91.6 kg)     BP Readings from Last 3 Encounters:   09/15/20 100/84   02/13/20 120/76   12/13/19 112/66       Physical Exam  Vitals signs and nursing note reviewed. Constitutional:       General: He is not in acute distress. Appearance: He is well-developed. Eyes:      Conjunctiva/sclera: Conjunctivae normal.   Neck:      Musculoskeletal: Normal range of motion and neck supple. Cardiovascular:      Rate and Rhythm: Normal rate and regular rhythm. Heart sounds: Normal heart sounds. No murmur. Pulmonary:      Effort: Pulmonary effort is normal. No respiratory distress.       Breath sounds: Normal breath sounds. No wheezing or rales. Musculoskeletal: Normal range of motion. Lymphadenopathy:      Cervical: No cervical adenopathy. Skin:     General: Skin is warm and dry. Findings: No rash. Neurological:      Mental Status: He is alert and oriented to person, place, and time. Psychiatric:         Attention and Perception: Attention normal.         Mood and Affect: Mood is anxious. Speech: Speech normal.         Behavior: Behavior is hyperactive. Cognition and Memory: Cognition normal.         Judgment: Judgment normal.           Assessment/Plan:  1. MDD (major depressive disorder), recurrent, severe, with psychosis ([facility]  Improving; he seems to be doing a little better. He will continue to follow with his psychiatrist.     - [address] MED LIST    2. Generalized anxiety disorder with panic attacks  Stable; he is doing okay, but still regularly feeling anxious.    - MD DISCHARGE MEDS RECONCILED W/ CURRENT OUTPATIENT MED LIST    3. Hyperthyroidism  Worsening; his tsh is decreased so I started him on tapazole. I will recheck levels in 1 month. - TSH With Reflex Ft4; Future    4. Cannabis hyperemesis syndrome concurrent with and due to cannabis abuse ([facility]  Improving; he is doing much better since he stopped using marijuana. I urged him to continue to stay off of it. 5. Erectile dysfunction  New; likely due to his meds and anxiety. I suggested he talk to his psychiatrist to see if viagra is safe with his meds. If it is then I will send it in for him. Medical Decision Making: moderate complexity      Return in 6 months (on 3/15/2021) for hyperthyroidism.     Gabby Kingston MD  5:34 PM  9/15/2020

## 2020-09-21 RX ORDER — PANTOPRAZOLE SODIUM 40 MG/1
TABLET, DELAYED RELEASE ORAL
Qty: 60 TABLET | Refills: 5 | OUTPATIENT
Start: 2020-09-21

## 2020-09-21 NOTE — TELEPHONE ENCOUNTER
Fax received from [facility], [address] is requesting a refill on the following medication(s):  Requested Prescriptions     Pending Prescriptions Disp Refills    pantoprazole (PROTONIX) 40 MG tablet 30 tablet      Sig: TAKE 1 TABLET BY MOUTH TWICE DAILY       Last Visit Date (If Applicable):  7/58/6755    Next Visit Date:    3/15/2021

## 2020-10-28 RX ORDER — SUCRALFATE 1 G/1
1 TABLET ORAL 4 TIMES DAILY
Qty: 120 TABLET | Refills: 5 | Status: SHIPPED | OUTPATIENT
Start: 2020-10-28 | End: 2021-03-15 | Stop reason: SDUPTHER

## 2020-11-11 ENCOUNTER — TELEPHONE (OUTPATIENT)
Dept: FAMILY MEDICINE CLINIC | Age: 30
End: 2020-11-11

## 2020-11-11 NOTE — TELEPHONE ENCOUNTER
Patients mom called and asked that patient receive something for sleep. States he doesn't sleep at night. She said he does have labs to do but has a lot of anxiety right now.

## 2020-11-12 ENCOUNTER — TELEPHONE (OUTPATIENT)
Dept: FAMILY MEDICINE CLINIC | Age: 30
End: 2020-11-12

## 2020-11-12 NOTE — TELEPHONE ENCOUNTER
Patients mom is wanting him to have a referral for the [facility] center because she feels like he is on to many medications. She has a lot of concerns. She said patient has to have Serotonin syndrome. She is also concerned patient will get suicidal since he has before. Could you see patient soon? Please advise and call [phone] to discuss.

## 2020-11-12 NOTE — TELEPHONE ENCOUNTER
The [facility] is for kids. He is over 18 so they will not see him as far as I know. Does she just want a different psychiatrist? What makes her think he has serotonin syndrome (that is a medical emergency and I saw he was in the ER yesterday and the ER doctor did not seem concerned). Based on everything I've seen with him it appears he is going to need a lot of medications to help him feel stable.

## 2020-11-13 ENCOUNTER — VIRTUAL VISIT (OUTPATIENT)
Dept: FAMILY MEDICINE CLINIC | Age: 30
End: 2020-11-13
Payer: MEDICARE

## 2020-11-13 ENCOUNTER — TELEPHONE (OUTPATIENT)
Dept: FAMILY MEDICINE CLINIC | Age: 30
End: 2020-11-13

## 2020-11-13 PROBLEM — R45.851 DEPRESSION WITH SUICIDAL IDEATION: Status: ACTIVE | Noted: 2020-11-13

## 2020-11-13 PROBLEM — F32.A DEPRESSION WITH SUICIDAL IDEATION: Status: ACTIVE | Noted: 2020-11-13

## 2020-11-13 PROCEDURE — 99211 OFF/OP EST MAY X REQ PHY/QHP: CPT

## 2020-11-13 PROCEDURE — G8427 DOCREV CUR MEDS BY ELIG CLIN: HCPCS | Performed by: FAMILY MEDICINE

## 2020-11-13 PROCEDURE — 99214 OFFICE O/P EST MOD 30 MIN: CPT | Performed by: FAMILY MEDICINE

## 2020-11-13 ASSESSMENT — ENCOUNTER SYMPTOMS
CHEST TIGHTNESS: 0
ABDOMINAL PAIN: 1
COUGH: 0
VOMITING: 1
WHEEZING: 0
SHORTNESS OF BREATH: 0
NAUSEA: 1

## 2020-11-13 NOTE — TELEPHONE ENCOUNTER
Late entry- mother had called in wanting Dr Bhupendra Kiran to know that pt was currently at [facility] ER. Pt was waiting for bed to be admitted to [facility] or [address] [place] [address]. Mother stated that they did rule out seratonin disease. She also stated that [facility] did not have a psych [facility] for pt to be admitted into. This is why they were waiting for a bed elsewhere. Mother stated that pt had decided against admitting self to the other hospitals. Mother wanted to know (per therapist's advice) if pcp would or could pink slip pt. Spoke to Dr Bhupendra Kiran, will not pink slip pt. Informed mother that Dr Dotty Augustine would not pink slip pt. Advised mother that pt should proceed with the admittance since is struggling. Mother voiced understanding and was to call patient's girlfriend to relay.

## 2020-11-13 NOTE — PROGRESS NOTES
2020    TELEHEALTH EVALUATION -- Audio/Visual (During [facility]VAOR-15 public health emergency)    HPI: Seen today via video visit while he was at home and I was at work    Shea Pacheco (:  1990) has requested an audio/video evaluation for the following concern(s): Anxiety: worsening; he feels like he can't stop moving. He went to the hospital but it didn't help. He has pain in his stomach and in his head. His girl friend googled it and said that he may have serotonin syndrome. He has been vomiting. He had a meeting with his psychiatrist and she said that it was the marijuana and wouldn't change anything. He said that he can't live like this and will kill himself today by cutting his wrists if he is not taken care of. His mom is frustrated because he has not had any marijuana in several weeks and she just feels like he is over medicated. She is very frustrated that serotonin syndrome is not being properly addressed as a potential problem. During our visit Boogie was laying on the floor in the living room just moving his arms and legs non stop and yelling that he can't live like this. He is fully conscious and aware of his surroundings. Mom is worried that he had a seizure last night because at one time his eyes rolled in the back of his head. Mom also mentioned that he has been hallucinating. At his last visit his thyroid was found to be low in the hyperthyroid range which I suspected was making his anxiety worse so I started him on tapazole. He had his thyroid checked two days ago and it was in the normal range. Review of Systems   Constitutional: Positive for activity change, appetite change and fatigue. Negative for chills and fever. Respiratory: Negative for cough, chest tightness, shortness of breath and wheezing. Cardiovascular: Positive for palpitations. Negative for chest pain and leg swelling. Gastrointestinal: Positive for abdominal pain, nausea and vomiting.    Neurological: Positive for seizures (possibly) and headaches. Negative for dizziness, syncope, weakness and light-headedness. Psychiatric/Behavioral: Positive for agitation, confusion, decreased concentration, dysphoric mood, hallucinations, sleep disturbance and suicidal ideas. The patient is nervous/anxious and is hyperactive. Prior to Visit Medications    Medication Sig Taking? Authorizing Provider   sucralfate (CARAFATE) 1 GM tablet Take 1 tablet by mouth 4 times daily Yes Larry Romero MD   propranolol (INDERAL) 10 MG tablet Take 10 mg by mouth 3 times daily Yes Historical Provider, MD   perphenazine 4 MG tablet Take 1 tablet by mouth 3 times daily Yes Historical Provider, MD   calcium carbonate (TUMS) 500 MG chewable tablet Take 200 mg by mouth daily Yes Historical Provider, MD   benztropine (COGENTIN) 2 MG tablet Take 1 tablet by mouth 2 times daily Yes Historical Provider, MD   EQ ALLERGY RELIEF 25 MG capsule Take 2 capsules by mouth every 4 hours as needed for Allergies Yes Historical Provider, MD   atorvastatin (LIPITOR) 20 MG tablet Take 20 mg by mouth nightly Yes Historical Provider, MD   ondansetron (ZOFRAN) 4 MG tablet Take 1 tablet by mouth every 8 hours as needed for Nausea or Vomiting Yes Larry Romero MD   medical marijuana Take by mouth as needed. Yes Historical Provider, MD   OLANZapine zydis (ZYPREXA) 20 MG disintegrating tablet Take 20 mg by mouth nightly Yes Historical Provider, MD   desvenlafaxine succinate (PRISTIQ) 100 MG TB24 extended release tablet Take by mouth daily Yes Historical Provider, MD   ondansetron (ZOFRAN-ODT) 8 MG TBDP disintegrating tablet Place 2 mg under the tongue every 8 hours as needed for Nausea or Vomiting Yes Historical Provider, MD   LORazepam (ATIVAN) 1 MG tablet Take 1 mg by mouth every 6 hours as needed for Anxiety.  Yes Historical Provider, MD   dicyclomine (BENTYL) 10 MG capsule Take 1 capsule by mouth 4 times daily Yes Larry Romero MD   Cholecalciferol (VITAMIN D3) 2000 units CAPS Take 2 capsules by mouth daily Yes Paul Katz MD   pantoprazole (PROTONIX) 20 MG tablet TAKE 1 TABLET BY MOUTH TWICE DAILY Yes PELON Hinson   Ergocalciferol (VITAMIN D2 PO) Take 1 capsule by mouth once a week Indications: 50,00 IU Yes Historical Provider, MD   fluticasone (FLONASE) 50 MCG/ACT nasal spray 1 spray by Each Nare route daily Yes Paul Katz MD   mirtazapine (REMERON) 30 MG tablet Take 1 tablet by mouth nightly Yes DON Camargo - CNS   perphenazine 8 MG tablet Take 8 mg by mouth 2 times daily  Historical Provider, MD   ziprasidone (GEODON) 40 MG capsule Take 40 mg by mouth 2 times daily (with meals)  Historical Provider, MD   hydrOXYzine (ATARAX) 50 MG tablet Take 1 tablet by mouth 3 times daily as needed for Itching  Patient not taking: Reported on 2020  Paul Katz MD       Social History     Tobacco Use    Smoking status: Former Smoker     Packs/day: 0.50     Types: Cigarettes     Last attempt to quit: 2019     Years since quittin.4    Smokeless tobacco: Never Used    Tobacco comment: rashmi rrt 2019   Substance Use Topics    Alcohol use:  Yes     Alcohol/week: 2.0 standard drinks     Types: 2 Cans of beer per week     Comment: stopped drinking 7 months ago - 2018    Drug use: Yes     Frequency: 21.0 times per week     Types: Marijuana     Comment: 20 reports using marijuana 3 times a day, states has a medical card        Allergies   Allergen Reactions    Haldol [Haloperidol]      EPS    Zoloft [Sertraline Hcl] Other (See Comments)     nightmares   ,   Past Medical History:   Diagnosis Date    Acid reflux     Anxiety     Appendicitis     when 11years old    Diverticulitis     History of acute renal failure     due to dehydration    History of stomach ulcers     Irritable bowel syndrome     Ulcer        PHYSICAL EXAMINATION:  [ INSTRUCTIONS:  \"[x]\" Indicates a positive item  \"[]\" Indicates a negative item -- DELETE ALL ITEMS NOT EXAMINED]  Vital Signs: (As obtained by patient/caregiver or practitioner observation)    Patient-Reported Vitals 11/13/2020   Patient-Reported Weight 170 lbs   Patient-Reported Height 5'9          Constitutional: [x] Appears well-developed and well-nourished [] No apparent distress      [x] Abnormal- he is laying on the floor moving his arms and legs in random flailing movements which moaning and yelling for help. Mental status  [x] Alert and awake  [x] Oriented to person/place/time [x]Able to follow commands      Eyes:  EOM    [x]  Normal  [] Abnormal-  Sclera  [x]  Normal  [] Abnormal -         Discharge []  None visible  [] Abnormal -    HENT:   [x] Normocephalic, atraumatic. [] Abnormal   [x] Mouth/Throat: Mucous membranes are moist.     External Ears [x] Normal  [] Abnormal-     Neck: [x] No visualized mass     Pulmonary/Chest: [x] Respiratory effort normal.  [x] No visualized signs of difficulty breathing or respiratory distress        [] Abnormal-      Musculoskeletal:        [x] Normal range of motion of neck        [] Abnormal-       Neurological:        [x] No Facial Asymmetry (Cranial nerve 7 motor function) (limited exam to video visit)          [] Abnormal-         Skin:        [] No significant exanthematous lesions or discoloration noted on facial skin         [] Abnormal-            Psychiatric:       [] Normal Affect [] No Hallucinations        [x] Abnormal- he appears to be having conversion symptoms with the constant movement and he is very anxious and threatening suicide and he is depressed. Other pertinent observable physical exam findings-     ASSESSMENT/PLAN:  1. Generalized anxiety disorder with panic attacks  Worsening; he is extremely anxious today and I suspect his external movements are due to conversion disorder. He is very upset and mom claims he has not smoked marijuana recently.  Because of his dramatic movements today I recommended that he go to the ER in [place] to be seen since he is having such a dramatic reaction to his anxiety. 2. MDD (major depressive disorder), recurrent, severe, with psychosis ([address]  Worsening; he is threatening to cut his wrists to kill himself today if he does not get help. I told mom to take him to the ER for further evaluation. I am not concerned that he has serotonin syndrome like mom is, but I told her they will evaluate for that in the ER. 3. Hyperthyroidism  Improving; his thyroid level was normal on recent recheck. Return if symptoms worsen or fail to improve. Charmayne Fare is a 35 y.o. male being evaluated by a Virtual Visit (video visit) encounter to address concerns as mentioned above. A caregiver was present when appropriate. Due to this being a TeleHealth encounter (During [facility]- public health emergency), evaluation of the following organ systems was limited: Vitals/Constitutional/EENT/Resp/CV/GI//MS/Neuro/Skin/Heme-Lymph-Imm. Pursuant to the emergency declaration under the [address] [place] [address], [address] [place] [address] authority and the [employer] Act, this Virtual Visit was conducted with patient's (and/or legal guardian's) consent, to reduce the patient's risk of exposure to COVID-19 and provide necessary medical care. The patient (and/or legal guardian) has also been advised to contact this office for worsening conditions or problems, and seek emergency medical treatment and/or call 911 if deemed necessary. Patient identification was verified at the start of the visit: Yes    Total time spent on this encounter: 25 minutes    Services were provided through a video synchronous discussion virtually to substitute for in-person clinic visit. --Paul Katz MD on 11/13/2020 at 4:20 PM    An electronic signature was used to authenticate this note.

## 2020-11-14 ENCOUNTER — HOSPITAL ENCOUNTER (INPATIENT)
Age: 30
LOS: 4 days | Discharge: HOME | DRG: 753 | End: 2020-11-18
Attending: PSYCHIATRY & NEUROLOGY | Admitting: PSYCHIATRY & NEUROLOGY
Payer: MEDICARE

## 2020-11-14 PROBLEM — F31.60 BIPOLAR I DISORDER, MOST RECENT EPISODE (OR CURRENT) MIXED (HCC): Status: ACTIVE | Noted: 2020-11-13

## 2020-11-14 PROCEDURE — 1240000000 HC EMOTIONAL WELLNESS R&B

## 2020-11-14 PROCEDURE — 99223 1ST HOSP IP/OBS HIGH 75: CPT | Performed by: PSYCHIATRY & NEUROLOGY

## 2020-11-14 PROCEDURE — 6370000000 HC RX 637 (ALT 250 FOR IP): Performed by: PHYSICIAN ASSISTANT

## 2020-11-14 PROCEDURE — 6370000000 HC RX 637 (ALT 250 FOR IP): Performed by: PSYCHIATRY & NEUROLOGY

## 2020-11-14 PROCEDURE — APPSS30 APP SPLIT SHARED TIME 16-30 MINUTES: Performed by: PHYSICIAN ASSISTANT

## 2020-11-14 RX ORDER — POLYETHYLENE GLYCOL 3350 17 G/17G
17 POWDER, FOR SOLUTION ORAL DAILY PRN
Status: DISCONTINUED | OUTPATIENT
Start: 2020-11-14 | End: 2020-11-18 | Stop reason: HOSPADM

## 2020-11-14 RX ORDER — METHIMAZOLE 10 MG/1
10 TABLET ORAL 3 TIMES DAILY
COMMUNITY
End: 2021-09-23

## 2020-11-14 RX ORDER — TRAZODONE HYDROCHLORIDE 50 MG/1
50 TABLET ORAL NIGHTLY PRN
Status: DISCONTINUED | OUTPATIENT
Start: 2020-11-14 | End: 2020-11-18 | Stop reason: HOSPADM

## 2020-11-14 RX ORDER — OLANZAPINE 10 MG/1
10 TABLET ORAL NIGHTLY
Status: DISCONTINUED | OUTPATIENT
Start: 2020-11-14 | End: 2020-11-18 | Stop reason: HOSPADM

## 2020-11-14 RX ORDER — MAGNESIUM HYDROXIDE/ALUMINUM HYDROXICE/SIMETHICONE 120; 1200; 1200 MG/30ML; MG/30ML; MG/30ML
30 SUSPENSION ORAL EVERY 6 HOURS PRN
Status: DISCONTINUED | OUTPATIENT
Start: 2020-11-14 | End: 2020-11-18 | Stop reason: HOSPADM

## 2020-11-14 RX ORDER — DIVALPROEX SODIUM 500 MG/1
500 TABLET, EXTENDED RELEASE ORAL 2 TIMES DAILY
Status: DISCONTINUED | OUTPATIENT
Start: 2020-11-14 | End: 2020-11-18 | Stop reason: HOSPADM

## 2020-11-14 RX ORDER — HYDROXYZINE 50 MG/1
50 TABLET, FILM COATED ORAL 3 TIMES DAILY PRN
Status: DISCONTINUED | OUTPATIENT
Start: 2020-11-14 | End: 2020-11-18 | Stop reason: HOSPADM

## 2020-11-14 RX ORDER — PRAZOSIN HYDROCHLORIDE 1 MG/1
1 CAPSULE ORAL NIGHTLY
Status: ON HOLD | COMMUNITY
End: 2020-11-18 | Stop reason: HOSPADM

## 2020-11-14 RX ORDER — TRAZODONE HYDROCHLORIDE 50 MG/1
50 TABLET ORAL NIGHTLY
Status: ON HOLD | COMMUNITY
End: 2020-11-18 | Stop reason: HOSPADM

## 2020-11-14 RX ORDER — ACETAMINOPHEN 325 MG/1
650 TABLET ORAL EVERY 4 HOURS PRN
Status: DISCONTINUED | OUTPATIENT
Start: 2020-11-14 | End: 2020-11-18 | Stop reason: HOSPADM

## 2020-11-14 RX ORDER — IBUPROFEN 600 MG/1
600 TABLET ORAL EVERY 6 HOURS PRN
Status: DISCONTINUED | OUTPATIENT
Start: 2020-11-14 | End: 2020-11-18 | Stop reason: HOSPADM

## 2020-11-14 RX ORDER — LORAZEPAM 1 MG/1
1 TABLET ORAL ONCE
Status: COMPLETED | OUTPATIENT
Start: 2020-11-14 | End: 2020-11-14

## 2020-11-14 RX ADMIN — LORAZEPAM 1 MG: 1 TABLET ORAL at 18:02

## 2020-11-14 RX ADMIN — HYDROXYZINE HYDROCHLORIDE 50 MG: 50 TABLET, FILM COATED ORAL at 10:07

## 2020-11-14 RX ADMIN — TRAZODONE HYDROCHLORIDE 50 MG: 50 TABLET ORAL at 22:48

## 2020-11-14 RX ADMIN — HYDROXYZINE HYDROCHLORIDE 50 MG: 50 TABLET, FILM COATED ORAL at 22:48

## 2020-11-14 RX ADMIN — DIVALPROEX SODIUM 500 MG: 500 TABLET, EXTENDED RELEASE ORAL at 12:32

## 2020-11-14 RX ADMIN — DIVALPROEX SODIUM 500 MG: 500 TABLET, EXTENDED RELEASE ORAL at 18:02

## 2020-11-14 RX ADMIN — HYDROXYZINE HYDROCHLORIDE 50 MG: 50 TABLET, FILM COATED ORAL at 16:44

## 2020-11-14 RX ADMIN — OLANZAPINE 10 MG: 10 TABLET, FILM COATED ORAL at 22:48

## 2020-11-14 ASSESSMENT — SLEEP AND FATIGUE QUESTIONNAIRES
AVERAGE NUMBER OF SLEEP HOURS: 3
DIFFICULTY FALLING ASLEEP: YES
RESTFUL SLEEP: NO
DO YOU HAVE DIFFICULTY SLEEPING: YES
DIFFICULTY STAYING ASLEEP: YES
DIFFICULTY ARISING: NO
SLEEP PATTERN: DIFFICULTY FALLING ASLEEP;RESTLESSNESS;DISTURBED/INTERRUPTED SLEEP
DO YOU USE A SLEEP AID: YES

## 2020-11-14 ASSESSMENT — PATIENT HEALTH QUESTIONNAIRE - PHQ9: SUM OF ALL RESPONSES TO PHQ QUESTIONS 1-9: 12

## 2020-11-14 ASSESSMENT — LIFESTYLE VARIABLES
HISTORY_ALCOHOL_USE: NO
HISTORY_ALCOHOL_USE: NO

## 2020-11-14 NOTE — PLAN OF CARE
[facility]  Initial Interdisciplinary Treatment Plan NO      Original treatment plan Date & Time: 11/14/2020 6030    Admission Type:  Admission Type:  Involuntary    Reason for admission:   Reason for Admission: Depressed and suicidal due to aniversery of losing three family members    Estimated Length of Stay:  5-7days  Estimated Discharge Date: to be determined by physician    PATIENT STRENGTHS:  Patient Strengths:Strengths: Positive Support, Connection to output provider, Medication Compliance  Patient Strengths and Limitations:Limitations: Difficulty problem solving/relies on others to help solve problems, Hopeless about future  Addictive Behavior: Addictive Behavior  In the past 3 months, have you felt or has someone told you that you have a problem with:  : None  Do you have a history of Chemical Use?: No  Do you have a history of Alcohol Use?: No  Do you have a history of Street Drug Abuse?: No  Histroy of Prescripton Drug Abuse?: No  Medical Problems:  Past Medical History:   Diagnosis Date    Acid reflux     Anxiety     Appendicitis     when 11years old    Diverticulitis     History of acute renal failure     due to dehydration    History of stomach ulcers     Irritable bowel syndrome     Ulcer      Status EXAM:Status and Exam  Normal: No  Facial Expression: Sad, Worried  Affect: Congruent  Level of Consciousness: Alert  Mood:Normal: No  Mood: Depressed, Anxious  Motor Activity:Normal: Yes  Interview Behavior: Cooperative  Preception: [place] to Person, [place] Mention to Time, [place] to Place, [place] to Situation  Attention:Normal: No  Attention: Distractible  Thought Processes: Circumstantial  Thought Content:Normal: No  Thought Content: Preoccupations  Hallucinations: None  Delusions: No  Memory:Normal: Yes  Insight and Judgment: No  Insight and Judgment: Poor Judgment, Poor Insight  Present Suicidal Ideation: Yes(contracts for safety)  Present Homicidal Ideation: No    EDUCATION:   Learner Progress Toward Treatment Goals: reviewed group plans and strategies for care    Method:group therapy, medication compliance, individualized assessments and care planning    Outcome: needs reinforcement    PATIENT GOALS: to be discussed with patient within 72 hours    PLAN/TREATMENT RECOMMENDATIONS:     continue group therapy , medications compliance, goal setting, individualized assessments and care, continue to monitor pt on unit      SHORT-TERM GOALS:   Time frame for Short-Term Goals: 5-7 days    LONG-TERM GOALS:  Time frame for Long-Term Goals: 6 months  Members Present in Team Meeting: See Signature Sheet    [facility], [place]
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
## 2020-11-14 NOTE — H&P
HISTORY and [address]       NAME:  Shayan Lau  MRN: [id number]   YOB: 1990   Date: 11/14/2020   Age: 35 y.o. Gender: male     COMPLAINT AND PRESENT HISTORY:    Shayan Lau is a 35 y.o.  male, admitted because of increasing depression with suicidal ideation. According to ED/ Admission notes, patient came in with worsening anxiety and felt like he cannot stop moving. Pain in his stomach and head vomiting. Patient reports that he cannot live like this. Ports also indication indicating patient with low thyroid levels and was given Tapazole recent thyroid levels are stable. Upon speaking the patient he expressed some anxiety upon approach and appeared to progressively become more restless to the point of almost going into a panic attack. Patient states that he stopped his medicines a couple of days ago the doctor's orders to stop. Patient states that he has become more fearful and anxious lately. Patient was talking about he was in the shower talking to his that and if he spoke to writer he began to cry \"I need my dad\". Patient presented with gross motor agitation to his hands and generalized body. Patient complains of having racing thoughts. Patient states that he has sleeping disorders he states that he is unable to sleep more than 3 hours and when he wants to eat he becomes nervous when he is around other people and cannot eat. Patient denies any alcohol or drug abuse except for marijuana patient states that he has a medical card for this. Patient states that he lives with his mom. Patient denies any somatic complaints. Labs were showing some abnormality upon admission. No  chest pain or  shortness of breath. No fever/chills. Please see patient's psychiatric hx for more information.       DIAGNOSTIC RESULTS   Labs:  CBC:   Recent Labs     11/12/20  1100   WBC 19.4*   HGB 15.5   .5*     BMP:    Recent Labs     11/12/20  1100   NA 136   K 3.4*   CL 95*   CO2 20*   BUN 22*   CREATININE 1.0   GLUCOSE 109     Hepatic:   Recent Labs     11/12/20  1100   AST 37   ALT 31   BILITOT 1.5*   ALKPHOS 114     U/A:  Lab Results   Component Value Date    COLORU Dark Yellow 11/12/2020    COLORU NOT REPORTED 11/23/2019    WBCUA 0-4 11/12/2020    RBCUA Absent 11/12/2020    MUCUS 2+ 11/12/2020    MUCUS 2+ 11/23/2019    BACTERIA Trace 11/12/2020    BACTERIA None 11/23/2019    CLARITYU Hazy 11/12/2020    SPECGRAV 1.025 11/23/2019    LEUKOCYTESUR Negative 11/12/2020    BLOODU Negative 11/12/2020    GLUCOSEU Negative 11/12/2020    AMORPHOUS NOT REPORTED 11/23/2019       PAST MEDICAL HISTORY     Past Medical History:   Diagnosis Date    Acid reflux     Anxiety     Appendicitis     when 11years old    Diverticulitis     History of acute renal failure     due to dehydration    History of stomach ulcers     Irritable bowel syndrome     Ulcer      Pt denies any history of Diabetes mellitus type 2, hypertension, stroke, heart disease, COPD, Asthma,  HLD, Cancer, Seizures,Thyroid disease, Kidney Disease, Hepatitis, TB.    SURGICAL HISTORY       Past Surgical History:   Procedure Laterality Date    APPENDECTOMY      COLONOSCOPY      UPPER GASTROINTESTINAL ENDOSCOPY      WISDOM TOOTH EXTRACTION         FAMILY HISTORY       Family History   Problem Relation Age of Onset    Kidney Disease Mother     Other Mother         Gastroesophageal reflux disease    Allergies Brother     Cancer Maternal Uncle         Great-uncle    Crohn's Disease Maternal Uncle     Other Father         drug overdose       SOCIAL HISTORY       Social History     Socioeconomic History    Marital status: Single     Spouse name: None    Number of children: None    Years of education: None    Highest education level: None   Occupational History     Employer: [employer]   Social Needs    Financial resource strain: None    Food insecurity     Worry: None     Inability: None    Transportation needs     Medical: None     Non-medical: None   Tobacco Use    Smoking status: Former Smoker     Packs/day: 0.50     Types: Cigarettes     Last attempt to quit: 2019     Years since quittin.4    Smokeless tobacco: Never Used    Tobacco comment: rashmi rrt 2019   Substance and Sexual Activity    Alcohol use: Yes     Alcohol/week: 2.0 standard drinks     Types: 2 Cans of beer per week     Comment: stopped drinking 7 months ago - 2018    Drug use: Yes     Frequency: 21.0 times per week     Types: Marijuana     Comment: 20 reports using marijuana 3 times a day, states has a medical card    Sexual activity: Not Currently     Partners: Female   Lifestyle    Physical activity     Days per week: None     Minutes per session: None    Stress: None   Relationships    Social connections     Talks on phone: None     Gets together: None     Attends [employer] service: None     Active member of club or organization: None     Attends meetings of clubs or organizations: None     Relationship status: None    Intimate partner violence     Fear of current or ex partner: None     Emotionally abused: None     Physically abused: None     Forced sexual activity: None   Other Topics Concern    None   Social History Narrative    None           REVIEW OF SYSTEMS      Allergies   Allergen Reactions    Haldol [Haloperidol]      EPS    Zoloft [Sertraline Hcl] Other (See Comments)     nightmares       No current facility-administered medications on file prior to encounter.       Current Outpatient Medications on File Prior to Encounter   Medication Sig Dispense Refill    sucralfate (CARAFATE) 1 GM tablet Take 1 tablet by mouth 4 times daily 120 tablet 5    propranolol (INDERAL) 10 MG tablet Take 10 mg by mouth 3 times daily      perphenazine 4 MG tablet Take 1 tablet by mouth 3 times daily      perphenazine 8 MG tablet Take 8 mg by mouth 2 times daily      calcium carbonate (TUMS) 500 MG intact, taste and smell were not examined. No apparent focal sensory or motor deficits. Muscle strength equal Octavio. No facial droop, tongue protrudes centrally, no slurring of the speech. PROVISIONAL DIAGNOSES:      Active Problems:    Depression with suicidal ideation  Resolved Problems:    * No resolved hospital problems.  *      LUPIS BRIONES, DON - CNP on 11/14/2020 at 11:17 AM

## 2020-11-14 NOTE — GROUP NOTE
Group Therapy Note    Date: 11/14/2020    Group Start Time: 1600  Group End Time: 0187  Group Topic: Group Documentation    [facility]    Para Keyanna        Group Therapy Note    Attendees: 8/15         Patient's Goal:  Attend and participate in wellness group    Notes:  10 lessons to help you change the world and yourself  Status After Intervention:  Improved    Participation Level:  Active Listener and Interactive    Participation Quality: Appropriate, Attentive, Sharing and Supportive      Speech:  normal      Thought Process/Content: Logical      Affective Functioning: Congruent      Mood: anxious      Level of consciousness:  Alert, Oriented x4 and Attentive      Response to Learning: Able to verbalize current knowledge/experience, Able to verbalize/acknowledge new learning, Able to retain information and Capable of insight      Endings: None Reported    Modes of Intervention: Education, Support, Exploration and Problem-solving      Discipline Responsible: [facility] [address] [facility] [employer]      Signature:  Sharifa Briceño

## 2020-11-14 NOTE — GROUP NOTE
Group Therapy Note    Date: 11/14/2020    Group Start Time: 0900  Group End Time: 0915  Group Topic: Community Meeting    [facility] JASON Christensen, [place]    Patient refused to attend Goal Setting / Community Meeting Group at 0900 after encouragement from staff. 1:1 talk time offered.     Signature:  Narinder Capone

## 2020-11-14 NOTE — BH NOTE
`[facility]  Admission Note     Admission Type:   Admission Type:  Involuntary    Reason for admission:  Reason for Admission: Depressed and suicidal due to aniversery of losing three family members    PATIENT STRENGTHS:  Strengths: Positive Support, Connection to output provider, Medication Compliance    Patient Strengths and Limitations:  Limitations: Difficulty problem solving/relies on others to help solve problems, Hopeless about future    Addictive Behavior:   Addictive Behavior  In the past 3 months, have you felt or has someone told you that you have a problem with:  : None  Do you have a history of Chemical Use?: No  Do you have a history of Alcohol Use?: No  Do you have a history of Street Drug Abuse?: No  Histroy of Prescripton Drug Abuse?: No    Medical Problems:   Past Medical History:   Diagnosis Date    Acid reflux     Anxiety     Appendicitis     when 11years old    Diverticulitis     History of acute renal failure     due to dehydration    History of stomach ulcers     Irritable bowel syndrome     Ulcer        Status EXAM:  Status and Exam  Normal: No  Facial Expression: Sad, Worried  Affect: Congruent  Level of Consciousness: Alert  Mood:Normal: No  Mood: Depressed, Anxious  Motor Activity:Normal: Yes  Interview Behavior: Cooperative  Preception: [place] to Person, Jus Sovereign to Time, [place] to Place, [place] to Situation  Attention:Normal: No  Attention: Distractible  Thought Processes: Circumstantial  Thought Content:Normal: No  Thought Content: Preoccupations  Hallucinations: None  Delusions: No  Memory:Normal: Yes  Insight and Judgment: No  Insight and Judgment: Poor Judgment, Poor Insight  Present Suicidal Ideation: Yes(contracts for safety)  Present Homicidal Ideation: No    Tobacco Screening:  Practical Counseling, on admission, tracey X, if applicable and completed (first 3 are required if patient doesn't refuse):            ( )  Recognizing danger situations (included triggers and roadblocks)                    ( )  Coping skills (new ways to manage stress, exercise, relaxation techniques, changing routine, distraction)                                                           ( )  Basic information about quitting (benefits of quitting, techniques in how to quit, available resources  ( ) Referral for counseling faxed to [facility]                                           ( ) Patient refused counseling  (x ) Patient has not smoked in the last 30 days    Metabolic Screening:    Lab Results   Component Value Date    LABA1C 5.5 05/12/2019       Lab Results   Component Value Date    CHOL 200 (H) 04/03/2019    CHOL 212 (H) 07/30/2018     Lab Results   Component Value Date    TRIG 210 (H) 04/03/2019    TRIG 88 07/30/2018     Lab Results   Component Value Date    HDL 37 (L) 04/03/2019    HDL 37 (L) 07/30/2018     No components found for: LDLCAL  No results found for: LABVLDL      Body mass index is 30.13 kg/m². BP Readings from Last 2 Encounters:   11/14/20 126/77   09/15/20 100/84           Pt admitted with followings belongings:  Dentures: None  Vision - Corrective Lenses: None  Hearing Aid: None  Jewelry: None  Body Piercings Removed: N/A  Clothing: Undergarments (Comment), Pants, Shirt, Footwear, Socks  Were All Patient Medications Collected?: Not Applicable  Other Valuables: None     Valuables placed in safe in security envelope, number:  N/A. Patient's home medications need verified. Patient oriented to surroundings and program expectations and copy of patient rights given. Received admission packet:  Yes. Consents reviewed, signed No. Refused yes. Patient verbalize understanding: yes. Patient education on precautions: yes    Pt admitted from [facility] for suicidal ideations to cut wrist. Pt has increased anxiety and depression over the last three years. Pt had three people in his family die in 2017 and is still grieving their loss.  Pt denies drugs or alcohol. Pt offered nourishment and fluids. Pt wanded and changed into hospital attire for safety.                    Juliana Deng RN
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
## 2020-11-14 NOTE — BH NOTE
Patient was anxious as evidence by stating that he was having a panic attack, pressured speech, and hyperactivity. Staff offered 1:1 talk time, music therapy, decreased stimulation via quiet room. Patient accepted as needed oral medication and is now listening to music and having 1:1 talk time with staff.

## 2020-11-14 NOTE — PLAN OF CARE
Problem: Altered Mood, Depressive Behavior:  Goal: Able to verbalize and/or display a decrease in depressive symptoms  Description: Able to verbalize and/or display a decrease in depressive symptoms  11/14/2020 1548 by Gerry Dover RN  Outcome: Ongoing   Patient was accepting of shift assessment. Patient stated that he wanted to shower and hygiene supplies were provided. Patient has appetite and continues to eat majority of meals. Patient stated that he slept poor last night. Patient was tearful and very upset during the shift assessment. Patient stated that his father passed away and he has been very depressed about that and other recent family losses. As the day has progressed, he has stated that he is not as depressed as he was in the morning and has been accepting of 1:1 talk time and other activities help keep his thoughts in order. Problem: Altered Mood, Depressive Behavior:  Goal: Absence of self-harm  Description: Absence of self-harm  11/14/2020 1548 by Gerry Dover RN  Outcome: Ongoing   Patient remains free of self harm at time of writing. Pt. Remains on q15 min checks and frequent spontaneous checks throughout shift. Pt. Safety maintained.

## 2020-11-14 NOTE — GROUP NOTE
Group Therapy Note    Date: 11/14/2020    Group Start Time: 1330  Group End Time: 0170  Group Topic: Recreational    [facility]    [place], [place]    Attendees: 7         Patient's Goal:  To demonstrate increased interpersonal skills. Notes:  Patient attended group and actively participated in task at hand. Patient conversed appropriately with peers and Rasta Shi. Status After Intervention:  Improved    Participation Level:  Active Listener and Interactive    Participation Quality: Appropriate, Attentive and Sharing      Speech:  normal      Thought Process/Content: Logical      Affective Functioning: Flat      Mood: dysphoric      Level of consciousness:  Alert, Oriented x4 and Attentive      Response to Learning: Able to verbalize current knowledge/experience, Able to verbalize/acknowledge new learning, Able to retain information, Capable of insight and Progressing to goal      Endings: None Reported       Modes of Intervention: Socialization, Exploration, Clarifying, Problem-solving, Activity, Limit-setting and Reality-testing      Discipline Responsible: Psychoeducational Specialist      Signature:  Anibal Castillo

## 2020-11-14 NOTE — BH NOTE
Patient was anxious and agitated as evidence by patient breathing fast, increased motor activity, and pressured speech. Staff offered 1:1 talk time, redirection activities, and relaxation techniques to slow breathing. Patient was accepting of as needed atarax to \"calm down\" and given. Patient is now resting in room quietly, breathing unlabored.

## 2020-11-14 NOTE — GROUP NOTE
Group Therapy Note    Date: 11/14/2020    Group Start Time: 1000  Group End Time: 1110  Group Topic: Psychotherapy    ALISSA Murphy LSW        Group Therapy Note    patient refused to attend psychoeducational group at 10:00am after encouragement from staff.      Signature:  ALISSA Kebede LSW

## 2020-11-15 PROCEDURE — 6370000000 HC RX 637 (ALT 250 FOR IP): Performed by: PHYSICIAN ASSISTANT

## 2020-11-15 PROCEDURE — 1240000000 HC EMOTIONAL WELLNESS R&B

## 2020-11-15 PROCEDURE — 6370000000 HC RX 637 (ALT 250 FOR IP): Performed by: PSYCHIATRY & NEUROLOGY

## 2020-11-15 PROCEDURE — 99232 SBSQ HOSP IP/OBS MODERATE 35: CPT | Performed by: PHYSICIAN ASSISTANT

## 2020-11-15 RX ORDER — CLONAZEPAM 0.5 MG/1
0.5 TABLET ORAL 2 TIMES DAILY PRN
Status: DISCONTINUED | OUTPATIENT
Start: 2020-11-15 | End: 2020-11-18 | Stop reason: HOSPADM

## 2020-11-15 RX ADMIN — DIVALPROEX SODIUM 500 MG: 500 TABLET, EXTENDED RELEASE ORAL at 18:36

## 2020-11-15 RX ADMIN — DIVALPROEX SODIUM 500 MG: 500 TABLET, EXTENDED RELEASE ORAL at 08:14

## 2020-11-15 RX ADMIN — HYDROXYZINE HYDROCHLORIDE 50 MG: 50 TABLET, FILM COATED ORAL at 00:39

## 2020-11-15 NOTE — GROUP NOTE
Group Therapy Note    Date: 11/15/2020    Group Start Time: 0900  Group End Time: 0915  Group Topic: Community Meeting    [address] [place] [address]    Patient refused to attend Goal Setting / Community Meeting Group at 0900 after encouragement from staff. 1:1 talk time offered.      Signature:  Ashly Barraza

## 2020-11-15 NOTE — PROGRESS NOTES
Department of Psychiatry  Progress Note     Chief Complaint:  Bipolar I disorder, most recent episode (or current) mixed ([address]     SUBJECTIVE:    PROGRESS:  Rodrigo Ennis reports he is \"alright, I feel better. I got some food in my stomach. \"    He feels his anxiety is better. He appears much less anxious today. . Rates his anxiety a 1/10 with 10 being the worst.   When asked what changed from yesterday, he states he is not sure but he slept all afternoon/evening and feels that helped him. Around 1800 last night per staff, \"patient was anxious as evidence by stating that he was having a panic attack, pressured speech, and hyperactivity. Staff offered 1:1 talk time, music therapy, decreased stimulation via quiet room. Patient accepted as needed oral medication and is now listening to music and having 1:1 talk time with staff. \" Rodrigo Ennis reports he was talking to someone about something and he started to freak out. He is not sure when the last time he took his Ativan. He feels it might have been 2-3 days prior to admission. He states he had not been taking it every day because he didn't need it that often. I verified O[facility]. His last prescription was 2mg BID PRN and was filled on 09/08/2020 for a #28 for a 14 day supply. Prior to that, Dr. Pedro Luis Oliva prescribed him 1mg TID PRN #60 for a 20 day supply on 08/25/2020. He states he went to the hospital in September and they raised it to 2mg BID PRN. He states his depression is \"pretty good, not too bad. \" He rates his depression a 3/10 with 10 being the worst.   Denies any suicidal ideation today. Did not have last night   Continues to feel hopeless and helpless at times but it has improved because he knows he has the nurses on the unit to help him. Denies any hallucinations   He reports he slept good last night. Did not have any nightmares. His appetite has been good on the unit. He states prior to admission, he lost 24 pounds. He feels the Depakote is helping his mood swings. When asked about side effects from his medications, he states it is makes him tired. He has been going to a couple groups. He reports they are \"pretty good. \"  Has been talking to his mom, sister, son and brother on the phone. Plans on calling his grandmother today. States it has been going good. Suicidal ideations: denies    Compliance with medications: good   Medication side effects: absent  ROS: Patient has new complaints:  no  Sleep quality: \"Good\"   Attending groups: yes-- few      OBJECTIVE      Medications  Current Facility-Administered Medications: acetaminophen (TYLENOL) tablet 650 mg, 650 mg, Oral, Q4H PRN  aluminum & magnesium hydroxide-simethicone (MAALOX) 200-200-20 MG/5ML suspension 30 mL, 30 mL, Oral, Q6H PRN  hydrOXYzine (ATARAX) tablet 50 mg, 50 mg, Oral, TID PRN  ibuprofen (ADVIL;MOTRIN) tablet 600 mg, 600 mg, Oral, Q6H PRN  polyethylene glycol (GLYCOLAX) packet 17 g, 17 g, Oral, Daily PRN  traZODone (DESYREL) tablet 50 mg, 50 mg, Oral, Nightly PRN  divalproex (DEPAKOTE ER) extended release tablet 500 mg, 500 mg, Oral, BID  OLANZapine (ZYPREXA) tablet 10 mg, 10 mg, Oral, Nightly     Physical     height is 5' 9\" (1.753 m) and weight is 204 lb (92.5 kg). His oral temperature is 97.9 °F (36.6 °C). His blood pressure is 106/68 and his pulse is 76. His respiration is 14 and oxygen saturation is 98%.    Lab Results   Component Value Date    WBC 19.4 (H) 11/12/2020    HGB 15.5 11/12/2020    HCT 43.2 11/12/2020    .5 (H) 11/12/2020    CHOL 200 (H) 04/03/2019    TRIG 210 (H) 04/03/2019    HDL 37 (L) 04/03/2019    ALT 31 11/12/2020    AST 37 11/12/2020     11/12/2020    K 3.4 (L) 11/12/2020    CL 95 (L) 11/12/2020    CREATININE 1.0 11/12/2020    BUN 22 (H) 11/12/2020    CO2 20 (L) 11/12/2020    TSH <0.02 (L) 03/28/2020    INR 1.1 08/07/2019    LABA1C 5.5 05/12/2019          Mental Status Exam:   Level of consciousness:  awake and alert  Appearance:  well-appearing, hospital attire, in chair, fair grooming and fair hygiene  Behavior/Motor: calm  Attitude toward examiner:  cooperative, attentive and good eye contact  Speech:  normal rate and normal volume  Mood:  \"better\"   Affect:  reactive  Thought processes:  linear, goal directed and coherent  Thought content:  Denies homicidal ideation  Suicidal Ideation:  denies suicidal ideation  Delusions:  no evidence of delusions  Perceptual Disturbance:  denies any perceptual disturbance  Cognition: Patient is oriented to person, place, time and situation  Concentration: clinically adequate  Memory: intact  Insight & Judgement: Fair      ASSESSMENT     Bipolar I disorder, most recent episode (or current) mixed ([address]   Rule out borderline personality disorder  PTSD  Anxiety NOS  Cannabis use    PLAN    Patient's symptoms show minimal improvement today  Medication adjustments: Will add Klonopin 0.5mg BID PRN for anxiety  Attempt to develop insight, psycho-education and supportive therapy conducted. Probable discharge: TBD   Follow-up: Dr. Duayne Lesch in [place] [address] is a 35 y.o. male being evaluated by a Virtual Visit (video visit) encounter to address concerns as mentioned above. A caregiver was present in the room along with the patient. Pursuant to the emergency declaration under the [facility], [address] [place] [address] authority and the [employer] and [employer] Act, this Virtual Visit was conducted with patient's (and/or legal guardian's) consent, to reduce the patient's risk of exposure to COVID-19 and provide necessary medical care. Services were provided through a video synchronous discussion virtually to substitute for in-person visit by provider. Patient is present at [facility] and I am physically present at my home in [address] on 11/15/2020 at 8:45 AM     An [address] [facility] [address] was used to authenticate this note.

## 2020-11-15 NOTE — GROUP NOTE
Group Therapy Note    Date: 11/15/2020    Group Start Time: 1330  Group End Time: 6644  Group Topic: Recreational    POLINA SAL    Lilian [place], [place]    Attendees: 8         Patient's Goal:  To demonstrate increased interpersonal skills. Notes:  Patient attended group and actively participated in task at hand. Patient conversed appropriately with peers and Roderick Johnston. Status After Intervention:  Improved    Participation Level:  Active Listener and Interactive    Participation Quality: Appropriate, Attentive and Sharing      Speech:  normal      Thought Process/Content: Logical      Affective Functioning: Flat      Mood: euthymic      Level of consciousness:  Alert, Oriented x4 and Attentive      Response to Learning: Able to verbalize current knowledge/experience, Able to verbalize/acknowledge new learning, Able to retain information, Capable of insight and Progressing to goal      Endings: None Reported       Modes of Intervention: Socialization, Exploration, Clarifying, Problem-solving, Activity, Limit-setting and Reality-testing      Discipline Responsible: Psychoeducational Specialist      Signature:  Khloe Maxwell

## 2020-11-15 NOTE — GROUP NOTE
Group Therapy Note    Date: 11/15/2020    Group Start Time: 1100  Group End Time: 0188  Group Topic: Psychoeducation    POLINA Hudson, MSW, LSW        Group Therapy Note    Attendees: 6/17    Pt declined to attend psychotherapy at 1000 am despite encouragement. Pt offered 1:1 and refused.

## 2020-11-15 NOTE — PLAN OF CARE
Problem: Altered Mood, Depressive Behavior:  Goal: Able to verbalize and/or display a decrease in depressive symptoms  Description: Able to verbalize and/or display a decrease in depressive symptoms  11/15/2020 0154 by Trini Avila LPN  Outcome: Ongoing     Problem: Altered Mood, Depressive Behavior:  Goal: Absence of self-harm  Description: Absence of self-harm  11/15/2020 0154 by Trini Avila LPN  Outcome: Ongoing   Remains isolated to room t/o this evening. Denies thoughts of and remains free from harm at this time.

## 2020-11-15 NOTE — PLAN OF CARE
[address] [place] [address]  Day 3 Interdisciplinary Treatment Plan NOTE    Review Date & Time: 11/15/2020 6108    Admission Type:   Admission Type:  Involuntary    Reason for admission:  Reason for Admission: Depressed and suicidal due to aniversery of losing three family members  Estimated Length of Stay: 5-7 days  Estimated Discharge Date Update: to be determined by physician    PATIENT STRENGTHS:  Patient Strengths Strengths: Communication  Patient Strengths and Limitations:Limitations: Hopeless about future  Addictive Behavior:Addictive Behavior  In the past 3 months, have you felt or has someone told you that you have a problem with:  : Other(Comments)(Has trouble eating in front of others)  Do you have a history of Chemical Use?: No  Do you have a history of Alcohol Use?: No  Do you have a history of Street Drug Abuse?: No  Histroy of Prescripton Drug Abuse?: No  Medical Problems:  Past Medical History:   Diagnosis Date    Acid reflux     Anxiety     Appendicitis     when 11years old    Diverticulitis     History of acute renal failure     due to dehydration    History of stomach ulcers     Irritable bowel syndrome     Ulcer        Risk:  Fall RiskTotal: 57  Latrell Scale Latrell Scale Score: 22  BVC Total: 0  Change in scores no Changes to plan of Care no    Status EXAM:   Status and Exam  Normal: No  Facial Expression: Sad  Affect: Appropriate  Level of Consciousness: Alert  Mood:Normal: No  Mood: Depressed, Anxious, Sad  Motor Activity:Normal: No  Motor Activity: Decreased  Interview Behavior: Cooperative, Evasive  Preception: [place] to Person, [place] to Time, [place] to Place, [place] to Situation  Attention:Normal: No  Attention: Distractible  Thought Processes: Circumstantial  Thought Content:Normal: No  Thought Content: Poverty of Content  Hallucinations: None  Delusions: No  Memory:Normal: Yes  Memory: Poor Recent, Poor Remote  Insight and Judgment: No  Insight and Judgment: Poor Judgment, Poor Insight  Present Suicidal Ideation: No  Present Homicidal Ideation: No    Daily Assessment Last Entry:   Daily Sleep (WDL): Exceptions to WDL         Patient Currently in Pain: Denies  Daily Nutrition (WDL): Within Defined Limits    Patient Monitoring:  Frequency of Checks: 4 times per hour, close    Psychiatric Symptoms:   Depression Symptoms  Depression Symptoms: Crying, Feelings of helplessness, Feelings of hopelessess  Anxiety Symptoms  Anxiety Symptoms: Generalized, Panic attack  Lulú Symptoms  Lulú Symptoms: No problems reported or observed. Psychosis Symptoms  Delusion Type: No problems reported or observed. Suicide Risk CSSR-S:  1) Within the past month, have you wished you were dead or wished you could go to sleep and not wake up? : Yes  2) Have you actually had any thoughts of killing yourself? : No  6) Have you ever done anything, started to do anything, or prepared to do anything to end your life?: No  Change in Result: No Change in Plan of care: No      EDUCATION:   EDUCATION:   Learner Progress Toward Treatment Goals: Reviewed results and recommendations of this team, Reviewed group plan and strategies, Reviewed signs, symptoms and risk of self harm and violent behavior, Reviewed goals and plan of care    Method:small group, individual verbal education    Outcome: Patient unable to attend treatment team meeting. Patient needs reinforcement to obtain goals. PATIENT GOALS:  Short term: Patient unable to attend treatment team meeting. Long term: Patient unable to attend treatment team meeting.      PLAN/TREATMENT RECOMMENDATIONS UPDATE: continue with group therapies, increased socialization, continue planning for after discharge goals, continue with medication compliance    SHORT-TERM GOALS UPDATE:   Time frame for Short-Term Goals: 5-7 days    LONG-TERM GOALS UPDATE:   Time frame for Long-Term Goals: 6 months  Members Present in Team Meeting: See Signature Sheet    Ildefonso Milligan [place]

## 2020-11-15 NOTE — GROUP NOTE
Group Therapy Note    Date: 11/15/2020    Group Start Time: 1600  Group End Time: 36  Group Topic: Healthy Living/Wellness    POLINA SAL    Osmar Lopez RN        Group Therapy Note    Attendees: 12/16         Patient's Goal:  Update and Discuss Current Events    Notes:  Patient was social in group and participated in watching current events. Status After Intervention:  Improved    Participation Level:  Active Listener    Participation Quality: Appropriate      Speech:  normal      Thought Process/Content: Logical      Affective Functioning: Congruent      Mood: euphoric      Level of consciousness:  Alert      Response to Learning: Able to verbalize current knowledge/experience      Endings: None Reported    Modes of Intervention: Media      Discipline Responsible: Registered Nurse      Signature:  Osmar Lopez RN

## 2020-11-16 PROCEDURE — 1240000000 HC EMOTIONAL WELLNESS R&B

## 2020-11-16 PROCEDURE — APPSS30 APP SPLIT SHARED TIME 16-30 MINUTES: Performed by: PHYSICIAN ASSISTANT

## 2020-11-16 PROCEDURE — 99232 SBSQ HOSP IP/OBS MODERATE 35: CPT | Performed by: PSYCHIATRY & NEUROLOGY

## 2020-11-16 PROCEDURE — 6370000000 HC RX 637 (ALT 250 FOR IP): Performed by: PSYCHIATRY & NEUROLOGY

## 2020-11-16 PROCEDURE — 6370000000 HC RX 637 (ALT 250 FOR IP): Performed by: PHYSICIAN ASSISTANT

## 2020-11-16 RX ORDER — DICYCLOMINE HYDROCHLORIDE 10 MG/1
10 CAPSULE ORAL 4 TIMES DAILY PRN
Status: DISCONTINUED | OUTPATIENT
Start: 2020-11-16 | End: 2020-11-18 | Stop reason: HOSPADM

## 2020-11-16 RX ORDER — SUCRALFATE 1 G/1
1 TABLET ORAL 4 TIMES DAILY
Status: DISCONTINUED | OUTPATIENT
Start: 2020-11-16 | End: 2020-11-18 | Stop reason: HOSPADM

## 2020-11-16 RX ORDER — ONDANSETRON 4 MG/1
4 TABLET, FILM COATED ORAL EVERY 8 HOURS PRN
Status: DISCONTINUED | OUTPATIENT
Start: 2020-11-16 | End: 2020-11-18 | Stop reason: HOSPADM

## 2020-11-16 RX ORDER — CALCIUM CARBONATE 200(500)MG
250 TABLET,CHEWABLE ORAL DAILY
Status: DISCONTINUED | OUTPATIENT
Start: 2020-11-16 | End: 2020-11-18 | Stop reason: HOSPADM

## 2020-11-16 RX ADMIN — SUCRALFATE 1 G: 1 TABLET ORAL at 17:45

## 2020-11-16 RX ADMIN — TRAZODONE HYDROCHLORIDE 50 MG: 50 TABLET ORAL at 22:44

## 2020-11-16 RX ADMIN — SUCRALFATE 1 G: 1 TABLET ORAL at 22:43

## 2020-11-16 RX ADMIN — OLANZAPINE 10 MG: 10 TABLET, FILM COATED ORAL at 22:44

## 2020-11-16 RX ADMIN — DIVALPROEX SODIUM 500 MG: 500 TABLET, EXTENDED RELEASE ORAL at 17:45

## 2020-11-16 RX ADMIN — HYDROXYZINE HYDROCHLORIDE 50 MG: 50 TABLET, FILM COATED ORAL at 22:44

## 2020-11-16 RX ADMIN — ANTACID TABLETS 250 MG: 500 TABLET, CHEWABLE ORAL at 14:21

## 2020-11-16 RX ADMIN — DIVALPROEX SODIUM 500 MG: 500 TABLET, EXTENDED RELEASE ORAL at 08:21

## 2020-11-16 RX ADMIN — SUCRALFATE 1 G: 1 TABLET ORAL at 14:20

## 2020-11-16 NOTE — PLAN OF CARE
Problem: Altered Mood, Depressive Behavior:  Goal: Able to verbalize and/or display a decrease in depressive symptoms  Description: Able to verbalize and/or display a decrease in depressive symptoms  Outcome: Ongoing  Goal: Absence of self-harm  Description: Absence of self-harm  Outcome: Ongoing   Pt remains free from self harm. Pt is isolative to room and does not eat much. Every 15 minute checks maintained for pt safety.

## 2020-11-16 NOTE — BH NOTE
RECEIVED call from pts Mother. She called to let us know pt. Was with his casemanager on Nov 10th. Mando Lulu has just tested positive for COVID-19. Pt. Was admitted here on Nov 14th and was tested upon admission. Pt. Did test negative. Charge nurse notified. Instructed to continue to monitor pt for symptoms.

## 2020-11-16 NOTE — BH NOTE
Problem: Altered Mood, Depressive Behavior:  Goal: Able to verbalize and/or display a decrease in depressive symptoms  Description: Able to verbalize and/or display a decrease in depressive symptoms  11/15/2020 0154 by Jose Warren LPN  Outcome: Ongoing     Problem: Altered Mood, Depressive Behavior:  Goal: Absence of self-harm  Description: Absence of self-harm  11/15/2020 0154 by Jose Warren LPN  Outcome: Ongoing   Remains isolated to room t/o this evening. Denies thoughts of and remains free from self  harm at this time.

## 2020-11-16 NOTE — GROUP NOTE
Group Therapy Note    Date: 11/16/2020    Group Start Time: 1000  Group End Time: 5805  Group Topic: Psychoeducation    [facility]    [place], [place]        Group Therapy Note    Attendees: 4/10             Patient's Goal:  To increase interpersonal interaction. Notes:  Pt attended and participated in group. Status After Intervention:  Improved    Participation Level:  Active Listener and Interactive    Participation Quality: Appropriate and Attentive      Speech:  normal      Thought Process/Content: Logical      Affective Functioning: Congruent      Mood: euthymic      Level of consciousness:  Alert and Attentive      Response to Learning: Progressing to goal      Endings: None Reported    Modes of Intervention: Socialization, Activity and Reality-testing      Discipline Responsible: Psychoeducational Specialist      Signature:  Daija Kohler

## 2020-11-16 NOTE — GROUP NOTE
Group Therapy Note    Date: 11/16/2020    Group Start Time: 0900  Group End Time: 0920  Group Topic: Community Meeting    POLINA Aguilar        Group Therapy Note    Pt did not attend community meeting group d/t resting in room despite staff invitation to attend. 1:1 talk time offered as alternative to group session, pt declined.

## 2020-11-16 NOTE — GROUP NOTE
Group Therapy Note    Date: 11/16/2020    Group Start Time: 1430  Group End Time: 6716  Group Topic: Psychoeducation    [facility]    Lisseth Pimple        Group Therapy Note    Pt did not attend psycho education quotes group d/t resting in room despite staff invitation to attend. 1:1 talk time offered as alternative to group session, pt declined.

## 2020-11-16 NOTE — PROGRESS NOTES
Department of Psychiatry  Progress Note     Chief Complaint:  Bipolar I disorder, most recent episode (or current) mixed ([address]     SUBJECTIVE:    PROGRESS:  Fide Crockett reports he is \"good. I feel better. I feel a lot better. \"     He appears much brighter and less anxious examination. He feels his anxiety is better. He states he has not had a lot of anxiety today. He has not had a panic attack in the last few days. He states his depression is \"pretty good. \" He rates his depression a 1-2/10 with 10 being the worst.   Denies any suicidal ideation today. Did not have last night   Continues to feel hopeless and helpless at times but it has improved because he knows he has the nurses on the unit to help him. Denies any hallucinations   He reports he slept good last night but had the sweats and chills. He denies any sore throat, cough, congestion, diarrhea. He did not have any nightmares last night. His appetite has been good on the unit. He has been more hungry which he feels is good because he wasn't eating much prior to admission. He feels the Depakote is helping his mood swings. When asked about side effects from his medications, he states making him sleepy but feels he is sleepy today because he was up staying   He has been going to groups. He reports they are \"pretty good. \"  He is planning on attending as many as he can to build on his coping skills. Has been talking to his mom, sister, son and brother on the phone. He states it is been going good and he is missing his family today.      Suicidal ideations: denies    Compliance with medications: good   Medication side effects: absent  ROS: Patient has new complaints:  no  Sleep quality: \"Good\"   Attending groups: yes-- few      OBJECTIVE      Medications  Current Facility-Administered Medications: clonazePAM (KLONOPIN) tablet 0.5 mg, 0.5 mg, Oral, BID PRN  acetaminophen (TYLENOL) tablet 650 mg, 650 mg, Oral, Q4H PRN  aluminum & magnesium hydroxide-simethicone (MAALOX) 200-200-20 MG/5ML suspension 30 mL, 30 mL, Oral, Q6H PRN  hydrOXYzine (ATARAX) tablet 50 mg, 50 mg, Oral, TID PRN  ibuprofen (ADVIL;MOTRIN) tablet 600 mg, 600 mg, Oral, Q6H PRN  polyethylene glycol (GLYCOLAX) packet 17 g, 17 g, Oral, Daily PRN  traZODone (DESYREL) tablet 50 mg, 50 mg, Oral, Nightly PRN  divalproex (DEPAKOTE ER) extended release tablet 500 mg, 500 mg, Oral, BID  OLANZapine (ZYPREXA) tablet 10 mg, 10 mg, Oral, Nightly     Physical     height is 5' 9\" (1.753 m) and weight is 204 lb (92.5 kg). His oral temperature is 98.2 °F (36.8 °C). His blood pressure is 120/78 and his pulse is 70. His respiration is 15 and oxygen saturation is 98%.    Lab Results   Component Value Date    WBC 19.4 (H) 11/12/2020    HGB 15.5 11/12/2020    HCT 43.2 11/12/2020    .5 (H) 11/12/2020    CHOL 200 (H) 04/03/2019    TRIG 210 (H) 04/03/2019    HDL 37 (L) 04/03/2019    ALT 31 11/12/2020    AST 37 11/12/2020     11/12/2020    K 3.4 (L) 11/12/2020    CL 95 (L) 11/12/2020    CREATININE 1.0 11/12/2020    BUN 22 (H) 11/12/2020    CO2 20 (L) 11/12/2020    TSH <0.02 (L) 03/28/2020    INR 1.1 08/07/2019    LABA1C 5.5 05/12/2019          Mental Status Exam:   Level of consciousness:  awake and alert  Appearance:  well-appearing, hospital attire, in chair, fair grooming and fair hygiene  Behavior/Motor: calm  Attitude toward examiner:  cooperative, attentive and good eye contact  Speech:  normal rate and normal volume  Mood:  \"better\"   Affect:  reactive  Thought processes:  linear, goal directed and coherent  Thought content:  Denies homicidal ideation  Suicidal Ideation:  denies suicidal ideation  Delusions:  no evidence of delusions  Perceptual Disturbance:  denies any perceptual disturbance  Cognition: Patient is oriented to person, place, time and situation  Concentration: clinically adequate  Memory: intact  Insight & Judgement: Fair      ASSESSMENT     Bipolar I disorder, most recent episode (or current) mixed ([address]   Rule out borderline personality disorder  PTSD  Anxiety NOS  Cannabis use    PLAN    Patient's symptoms show improvement today  Medication adjustments: Will resume his home GI medications per his request {Carafate, Zofran, Tums, Bentyl)  Attempt to develop insight, psycho-education and supportive therapy conducted. Probable discharge: TBD   Follow-up: Dr. Hewitt Favre in [place] [address] is a 35 y.o. male being evaluated by a Virtual Visit (video visit) encounter to address concerns as mentioned above. A caregiver was present in the room along with the patient. Pursuant to the emergency declaration under the [facility], [facility] authority and the [employer] and [employer] Act, this Virtual Visit was conducted with patient's (and/or legal guardian's) consent, to reduce the patient's risk of exposure to COVID-19 and provide necessary medical care. Services were provided through a video synchronous discussion virtually to substitute for in-person visit by provider. Patient is present at [facility] and I am physically present at [facility] in [address] on 11/16/2020 at 11:33 AM     An electronic signature was used to authenticate this note. Psychiatry Attending Attestation     I assessed this patient and [facility] the case [facility] I have reviewed the above documentation and I agree with the findings and treatment plan with the following updates. Patient reports he was having passive suicidal thoughts with severe anxiety this morning. Reports that he learned that  is disability was approved following which she was feeling much better. Will continue same medication today and observe.      Electronically signed by Carrol Kendrick MD on 11/16/20 at 8:05 PM EST    **This report has been created using voice recognition software. It may contain minor errors which are inherent in voice recognition technology. **

## 2020-11-16 NOTE — GROUP NOTE
Group Therapy Note    Date: 11/16/2020    Group Start Time: 1100  Group End Time: 1200  Group Topic: Psychotherapy    WADE Liu        Group Therapy Note    Attendees: 6/10           Patient's Goal:  To focus on the here and now with mental health stability. Verbalize acceptance of situations they cannot control. Notes: Able to work on socialization and processing emotions during group. Status After Intervention:  Unchanged    Participation Level:  Active Listener and Interactive    Participation Quality: Appropriate, Attentive, Sharing and Supportive      Speech:  normal      Thought Process/Content: Linear      Affective Functioning: Congruent      Mood: euthymic      Level of consciousness:  Alert, Oriented x4 and Attentive      Response to Learning: Progressing to goal      Endings: None Reported    Modes of Intervention: Education, Support, Socialization and Exploration      Discipline Responsible: /Counselor    Signature:  WADE Sousa

## 2020-11-16 NOTE — GROUP NOTE
Pt did not attend 4pm group despite staff encouragement. Pt offered other educational information pt declined.

## 2020-11-16 NOTE — GROUP NOTE
Group Therapy Note    Date: 11/16/2020    Group Start Time: 1330  Group End Time: 6445  Group Topic: Cognitive Skills    POLINA Moemobin, [address]        Group Therapy Note    Attendees: 7/17         Patient's Goal:  To increase interpersonal interaction. Notes:  Pt attended and participated in group. Status After Intervention:  Improved    Participation Level:  Active Listener and Interactive    Participation Quality: Appropriate, Attentive and Sharing      Speech:  normal      Thought Process/Content: Logical      Affective Functioning: Congruent      Mood: euthymic      Level of consciousness:  Alert and Attentive      Response to Learning: Able to verbalize current knowledge/experience and Progressing to goal      Endings: None Reported    Modes of Intervention: Education, Socialization, Problem-solving, Media and Reality-testing      Discipline Responsible: Psychoeducational Specialist      Signature:  Babak Lindsey

## 2020-11-17 PROCEDURE — 99232 SBSQ HOSP IP/OBS MODERATE 35: CPT | Performed by: PSYCHIATRY & NEUROLOGY

## 2020-11-17 PROCEDURE — 6370000000 HC RX 637 (ALT 250 FOR IP): Performed by: PSYCHIATRY & NEUROLOGY

## 2020-11-17 PROCEDURE — 1240000000 HC EMOTIONAL WELLNESS R&B

## 2020-11-17 PROCEDURE — 6370000000 HC RX 637 (ALT 250 FOR IP): Performed by: PHYSICIAN ASSISTANT

## 2020-11-17 RX ORDER — METHIMAZOLE 5 MG/1
5 TABLET ORAL
Status: DISCONTINUED | OUTPATIENT
Start: 2020-11-17 | End: 2020-11-18 | Stop reason: HOSPADM

## 2020-11-17 RX ADMIN — DIVALPROEX SODIUM 500 MG: 500 TABLET, EXTENDED RELEASE ORAL at 08:38

## 2020-11-17 RX ADMIN — DICYCLOMINE HYDROCHLORIDE 10 MG: 10 CAPSULE ORAL at 12:11

## 2020-11-17 RX ADMIN — SUCRALFATE 1 G: 1 TABLET ORAL at 12:11

## 2020-11-17 RX ADMIN — TRAZODONE HYDROCHLORIDE 50 MG: 50 TABLET ORAL at 22:07

## 2020-11-17 RX ADMIN — ONDANSETRON HYDROCHLORIDE 4 MG: 4 TABLET, FILM COATED ORAL at 04:15

## 2020-11-17 RX ADMIN — ANTACID TABLETS 250 MG: 500 TABLET, CHEWABLE ORAL at 08:38

## 2020-11-17 RX ADMIN — OLANZAPINE 10 MG: 10 TABLET, FILM COATED ORAL at 22:06

## 2020-11-17 RX ADMIN — DIVALPROEX SODIUM 500 MG: 500 TABLET, EXTENDED RELEASE ORAL at 17:48

## 2020-11-17 RX ADMIN — SUCRALFATE 1 G: 1 TABLET ORAL at 08:39

## 2020-11-17 RX ADMIN — HYDROXYZINE HYDROCHLORIDE 50 MG: 50 TABLET, FILM COATED ORAL at 08:54

## 2020-11-17 RX ADMIN — SUCRALFATE 1 G: 1 TABLET ORAL at 22:06

## 2020-11-17 RX ADMIN — SUCRALFATE 1 G: 1 TABLET ORAL at 17:48

## 2020-11-17 RX ADMIN — CLONAZEPAM 0.5 MG: 0.5 TABLET ORAL at 12:11

## 2020-11-17 NOTE — GROUP NOTE
Group Therapy Note    Date: 11/17/2020    Group Start Time: 1000  Group End Time: 0136  Group Topic: Psychoeducation    [facility]    [place], [place]        Group Therapy Note    Attendees: 4/9         Pt did not attend RT skills group d/t resting in room despite staff invitation to attend. 1:1 talk time offered as alternative to group session, pt declined.

## 2020-11-17 NOTE — GROUP NOTE
Group Therapy Note    Date: 11/17/2020    Group Start Time: 1100  Group End Time: 1200  Group Topic: Psychotherapy    [facility]    [address], [place]        Group Therapy Note    Attendees: 4/9      Pt denied 1:1. Despite staff encouragement, pt denied 11:00am psychotherapy group.                 Signature:  [address], [place]

## 2020-11-17 NOTE — GROUP NOTE
Group Therapy Note    Date: 11/17/2020    Group Start Time: 1330  Group End Time: 2299  Group Topic: Cognitive Skills    POLINA Plummer, [address]        Group Therapy Note    Attendees: 7/18    Pt did not attend RT skills group d/t resting in room despite staff invitation to attend. 1:1 talk time offered as alternative to group session, pt declined.

## 2020-11-17 NOTE — PLAN OF CARE
Problem: Altered Mood, Depressive Behavior:  Goal: Able to verbalize and/or display a decrease in depressive symptoms  Description: Able to verbalize and/or display a decrease in depressive symptoms  Outcome: Ongoing     Problem: Altered Mood, Depressive Behavior:  Goal: Absence of self-harm  Description: Absence of self-harm  Outcome: Ongoing   Patient currently denies suicidal ideas. No sign of self harm noted. Patient reports some depression and anxiety. Patient educated on coping skills for reducing symptoms. Will continue to monitor for safety every 15 minutes and provide support as needed.

## 2020-11-17 NOTE — PLAN OF CARE
Problem: Altered Mood, Depressive Behavior:  Goal: Able to verbalize and/or display a decrease in depressive symptoms  Description: Able to verbalize and/or display a decrease in depressive symptoms  11/17/2020 1402 by Helena Loaiza RN  Outcome: Ongoing  Patient is alert, observed in day area. Patient is flat on approach. Patient is currently denying thoughts to harm self or others. Patient reports not having any tactile, gustatory, auditory, olfactory, or visual hallucinations. Patient is admitting to increase anxiety, is observed pacing halls, stated that he is having a panic attack. PRN Atarax and Klonipin given at two separate times. Patient is isolative to room, aloof of peers, was encouraged to attend unit programming and socialize with peers. Sleep and appetite adequate. Patient is medication compliant, denies any side effects. Hygiene is appropriate. No further concerns voiced. Will continue to monitor. Problem: Altered Mood, Depressive Behavior:  Goal: Absence of self-harm  Description: Absence of self-harm  11/17/2020 1402 by Helena Loaiza RN  Outcome: Ongoing  Patient is currently denying thoughts to harm self or others.

## 2020-11-17 NOTE — GROUP NOTE
Group Therapy Note    Date: 11/17/2020    Group Start Time: 1600  Group End Time: 36  Group Topic: Healthy Living/Wellness    [facility]    Sonam Sandoval RN        Group Therapy Note    Attendees: 7/16         Patient's Goal:  Patient will verbalize coping skills related to anxiety     Notes:  Patient participated well in unit programming     Status After Intervention:  Improved    Participation Level:  Active Listener and Interactive    Participation Quality: Appropriate, Attentive, Sharing and Supportive      Speech:  normal      Thought Process/Content: Logical      Affective Functioning: Congruent      Mood: stable      Level of consciousness:  Alert, Oriented x4 and Attentive      Response to Learning: Able to verbalize current knowledge/experience, Able to verbalize/acknowledge new learning, Able to retain information and Capable of insight      Endings: None Reported    Modes of Intervention: Education, Support, Socialization and Exploration      Discipline Responsible: Registered Nurse      Signature:  Sonam Sandoval RN

## 2020-11-17 NOTE — GROUP NOTE
Group Therapy Note    Date: 11/17/2020    Group Start Time: 0900  Group End Time: 0930  Group Topic: Community Meeting    [facility] Judeen Klinefelter        Group Therapy Note    Pt did not attend community meeting group d/t resting in room despite staff invitation to attend. 1:1 talk time offered as alternative to group session, pt declined.

## 2020-11-17 NOTE — FLOWSHEET NOTE
*Patient participated in the Spirituality Group       11/17/20 1538   Encounter Summary   Services provided to: Patient   Referral/Consult From: Rounding   Continue Visiting   (11/17/20)   Complexity of Encounter Moderate   Length of Encounter 30 minutes   Spiritual/Restorationist   Type Spiritual support   Assessment Calm; Approachable   Intervention Active listening   Outcome Receptive;Engaged in conversation

## 2020-11-18 VITALS
OXYGEN SATURATION: 99 % | RESPIRATION RATE: 16 BRPM | BODY MASS INDEX: 30.21 KG/M2 | WEIGHT: 204 LBS | TEMPERATURE: 97.7 F | DIASTOLIC BLOOD PRESSURE: 83 MMHG | HEART RATE: 76 BPM | HEIGHT: 69 IN | SYSTOLIC BLOOD PRESSURE: 137 MMHG

## 2020-11-18 LAB
VALPROIC ACID LEVEL: 74 UG/ML (ref 50–125)
VALPROIC DATE LAST DOSE: NORMAL
VALPROIC DOSE AMOUNT: NORMAL
VALPROIC TIME LAST DOSE: 1748

## 2020-11-18 PROCEDURE — 6370000000 HC RX 637 (ALT 250 FOR IP): Performed by: PSYCHIATRY & NEUROLOGY

## 2020-11-18 PROCEDURE — 99239 HOSP IP/OBS DSCHRG MGMT >30: CPT | Performed by: PSYCHIATRY & NEUROLOGY

## 2020-11-18 PROCEDURE — 80164 ASSAY DIPROPYLACETIC ACD TOT: CPT

## 2020-11-18 PROCEDURE — 36415 COLL VENOUS BLD VENIPUNCTURE: CPT

## 2020-11-18 PROCEDURE — 6370000000 HC RX 637 (ALT 250 FOR IP): Performed by: PHYSICIAN ASSISTANT

## 2020-11-18 RX ORDER — DIVALPROEX SODIUM 500 MG/1
500 TABLET, EXTENDED RELEASE ORAL 2 TIMES DAILY
Qty: 60 TABLET | Refills: 0 | Status: SHIPPED | OUTPATIENT
Start: 2020-11-18 | End: 2021-09-23

## 2020-11-18 RX ORDER — TRAZODONE HYDROCHLORIDE 50 MG/1
50 TABLET ORAL NIGHTLY PRN
Qty: 30 TABLET | Refills: 0 | Status: SHIPPED | OUTPATIENT
Start: 2020-11-18 | End: 2021-03-15

## 2020-11-18 RX ORDER — OLANZAPINE 10 MG/1
10 TABLET ORAL NIGHTLY
Qty: 30 TABLET | Refills: 0 | Status: SHIPPED | OUTPATIENT
Start: 2020-11-18 | End: 2021-03-15

## 2020-11-18 RX ORDER — CLONAZEPAM 0.5 MG/1
0.5 TABLET ORAL 2 TIMES DAILY PRN
Qty: 14 TABLET | Refills: 0 | Status: SHIPPED | OUTPATIENT
Start: 2020-11-18 | End: 2021-03-15

## 2020-11-18 RX ADMIN — METHIMAZOLE 5 MG: 5 TABLET ORAL at 11:30

## 2020-11-18 RX ADMIN — DIVALPROEX SODIUM 500 MG: 500 TABLET, EXTENDED RELEASE ORAL at 09:08

## 2020-11-18 RX ADMIN — ANTACID TABLETS 250 MG: 500 TABLET, CHEWABLE ORAL at 09:08

## 2020-11-18 NOTE — CARE COORDINATION
Name: Aleksandra Wilson    : 1990    Discharge Date: 20    Primary Auth/Cert #: [id number]     Discharge Medications:      Medication List      START taking these medications    clonazePAM 0.5 MG tablet  Commonly known as:  KLONOPIN  Take 1 tablet by mouth 2 times daily as needed for Anxiety for up to 7 days.   Notes to patient:  anxiety     divalproex 500 MG extended release tablet  Commonly known as:  DEPAKOTE ER  Take 1 tablet by mouth 2 times daily  Notes to patient:  Mood stabilizer     OLANZapine 10 MG tablet  Commonly known as:  ZYPREXA  Take 1 tablet by mouth nightly  Replaces:  OLANZapine zydis 20 MG disintegrating tablet  Notes to patient:  Clear thought process        CHANGE how you take these medications    traZODone 50 MG tablet  Commonly known as:  DESYREL  Take 1 tablet by mouth nightly as needed for Sleep  What changed:    · when to take this  · reasons to take this  Notes to patient:  Sleep aid        CONTINUE taking these medications    dicyclomine 10 MG capsule  Commonly known as:  Bentyl  Take 1 capsule by mouth 4 times daily  Notes to patient:  Stomach cramps     hydrOXYzine 50 MG tablet  Commonly known as:  ATARAX  Take 1 tablet by mouth 3 times daily as needed for Itching  Notes to patient:  anxiety     ondansetron 4 MG tablet  Commonly known as:  Zofran  Take 1 tablet by mouth every 8 hours as needed for Nausea or Vomiting  Notes to patient:  Nausea/ vommiting     pantoprazole 20 MG tablet  Commonly known as:  PROTONIX  TAKE 1 TABLET BY MOUTH TWICE DAILY  Notes to patient:  Decrease stomach acid     sucralfate 1 GM tablet  Commonly known as:  CARAFATE  Take 1 tablet by mouth 4 times daily  Notes to patient:  Decrease stomach acid     Tapazole 10 MG tablet  Generic drug:  methIMAzole  Notes to patient:  Treat thyroid     Tums 500 MG chewable tablet  Generic drug:  calcium carbonate  Notes to patient:  Stomach upset     Vitamin D3 50 MCG (2000) Caps  Take 2 capsules by mouth daily  Notes to patient:  Dietary supplement        STOP taking these medications    atorvastatin 20 MG tablet  Commonly known as:  LIPITOR     benztropine 2 MG tablet  Commonly known as:  COGENTIN     desvenlafaxine succinate 100 MG Tb24 extended release tablet  Commonly known as:  PRISTIQ     EQ Allergy Relief 25 MG capsule  Generic drug:  diphenhydrAMINE     fluticasone 50 MCG/ACT nasal spray  Commonly known as:  FLONASE     LORazepam 1 MG tablet  Commonly known as:  ATIVAN     medical marijuana     mirtazapine 30 MG tablet  Commonly known as:  REMERON     OLANZapine zydis 20 MG disintegrating tablet  Commonly known as:  ZYPREXA  Replaced by:  OLANZapine 10 MG tablet     ondansetron 8 MG Tbdp disintegrating tablet  Commonly known as:  ZOFRAN-ODT     perphenazine 4 MG tablet     perphenazine 8 MG tablet     prazosin 1 MG capsule  Commonly known as:  MINIPRESS     propranolol 10 MG tablet  Commonly known as:  INDERAL     VITAMIN D2 PO     ziprasidone 40 MG capsule  Commonly known as:  GEODON           Where to Get Your Medications      These medications were sent to [place] [address]    Phone:  [phone]   · divalproex 500 MG extended release tablet  · OLANZapine 10 MG tablet  · traZODone 50 MG tablet     You can get these medications from any pharmacy    Bring a paper prescription for each of these medications  · clonazePAM 0.5 MG tablet         Follow Up Appointment: [address] [place] [address]   Address: [address]  Phone: [phone]        [facility]  Alanis Santacruz, [address]  Phone: [phone]  Fax: [phone]  On 11/23/2020  Intake and assessment @10:30am . Bring insurance card, SS card, ID and pay stubs     [facility]  [address], [place] [address]  Phone: [phone]  Fax: [phone]  On 11/19/2020  [email or web address]. Bring insurance card, SS card, ID and pay stubs

## 2020-11-18 NOTE — PROGRESS NOTES
19. 4 (H) 11/12/2020    HGB 15.5 11/12/2020    HCT 43.2 11/12/2020    .5 (H) 11/12/2020    CHOL 200 (H) 04/03/2019    TRIG 210 (H) 04/03/2019    HDL 37 (L) 04/03/2019    ALT 31 11/12/2020    AST 37 11/12/2020     11/12/2020    K 3.4 (L) 11/12/2020    CL 95 (L) 11/12/2020    CREATININE 1.0 11/12/2020    BUN 22 (H) 11/12/2020    CO2 20 (L) 11/12/2020    TSH <0.02 (L) 03/28/2020    INR 1.1 08/07/2019    LABA1C 5.5 05/12/2019          Mental Status Exam:   Level of consciousness:  awake and alert  Appearance:  well-appearing, hospital attire, in chair, fair grooming and fair hygiene  Behavior/Motor: calm  Attitude toward examiner:  cooperative, attentive and good eye contact  Speech:  normal rate and normal volume  Mood:  \"better\"   Affect:  reactive  Thought processes:  linear, goal directed and coherent  Thought content:  Denies homicidal ideation  Suicidal Ideation:  denies suicidal ideation  Delusions:  no evidence of delusions  Perceptual Disturbance:  denies any perceptual disturbance  Cognition: Patient is oriented to person, place, time and situation  Concentration: clinically adequate  Memory: intact  Insight & Judgement: Fair      ASSESSMENT     Bipolar I disorder, most recent episode (or current) mixed ([facility] [address]   Rule out borderline personality disorder  PTSD  Anxiety NOS  Cannabis use    PLAN  Patient's symptoms show improvement today  Medication adjustments: Will resume methimazole  Attempt to develop insight, psycho-education and supportive therapy conducted. Probable discharge: TBD   Follow-up: Dr. Chelsey Mario in [facility]     Electronically signed by Natanael Comer MD on 11/17/20 at 7:50 PM EST    **This report has been created using voice recognition software. It may contain minor errors which are inherent in voice recognition technology. **

## 2020-11-18 NOTE — PROGRESS NOTES
CLINICAL PHARMACY NOTE: MEDS TO [address] Select Patient?: Yes  Total # of Prescriptions Filled: 3   The following medications were delivered to the patient:  · Trazodone  · Divalproex  · Olanzapine  ·   Total # of Interventions Completed: 0  Time Spent (min): 30    Additional Documentation:

## 2020-11-18 NOTE — BH NOTE
Patient given tobacco quitline number 92916726785 at this time, refusing to call at this time, states \" I just dont want to quit now\"- patient given information as to the dangers of long term tobacco use. Continue to reinforce the importance of tobacco cessation.

## 2020-11-18 NOTE — GROUP NOTE
Group Therapy Note    Date: 11/18/2020    Group Start Time: 0900  Group End Time: 8454  Group Topic: Community Meeting    POLINA Michaels        Group Therapy Note    Pt did not attend community meeting group d/t resting in room despite staff invitation to attend. 1:1 talk time offered as alternative to group session, pt declined.

## 2020-11-18 NOTE — PLAN OF CARE
Problem: Altered Mood, Depressive Behavior:  Goal: Able to verbalize and/or display a decrease in depressive symptoms  Description: Able to verbalize and/or display a decrease in depressive symptoms  11/15/2020 0154 by Alma Suarez LPN  Outcome: Ongoing     Problem: Altered Mood, Depressive Behavior:  Goal: Absence of self-harm  Description: Absence of self-harm  11/15/2020 0154 by Alma Suarez LPN  Outcome: Ongoing  Out to dayroom and social with peers . Denies thoughts of and remains free from self  harm at this time.

## 2020-11-18 NOTE — GROUP NOTE
Group Therapy Note    Date: 11/18/2020    Group Start Time: 1100  Group End Time: 1451  Group Topic: Psychotherapy    WADE Liu        Group Therapy Note    Attendees: 4/8       Patient's Goal:  To focus on the here and now with mental health stability. Verbalize acceptance of situations they cannot control. Notes: Able to work on socialization and processing emotions during group. Status After Intervention:  Unchanged    Participation Level:  Active Listener and Interactive    Participation Quality: Appropriate, Attentive, Sharing and Supportive      Speech:  normal      Thought Process/Content: Linear      Affective Functioning: Congruent      Mood: euthymic      Level of consciousness:  Alert, Oriented x4 and Attentive      Response to Learning: Progressing to goal      Endings: None Reported    Modes of Intervention: Education, Support, Socialization and Exploration      Discipline Responsible: /Counselor  Signature:  WADE Kaufman

## 2020-11-18 NOTE — BH NOTE
[facility]  Discharge Note    Pt discharged with followings belongings:   Dentures: None  Vision - Corrective Lenses: None  Hearing Aid: None  Jewelry: None  Body Piercings Removed: N/A  Clothing: Undergarments (Comment), Pants, Shirt, Footwear, Socks  Were All Patient Medications Collected?: Not Applicable  Other Valuables: None   Valuables sent home with N/A. Valuables retrieved from safe, Security envelope number:  N/A and returned to patient. Patient education on aftercare instructions: yes  Information faxed to [facility] by RN Patient verbalize understanding of AVS:  Yes. Pt dishcharged ambulatory with self and grandmother to private residence.    Status EXAM upon discharge:  Status and Exam  Normal: No  Facial Expression: Flat  Affect: Appropriate  Level of Consciousness: Alert  Mood:Normal: No  Mood: Depressed, Anxious  Motor Activity:Normal: Yes  Motor Activity: Increased  Interview Behavior: Cooperative, Evasive  Preception: [place] to Person, Francois Munch to Time, [place] to Place, [place] to Situation  Attention:Normal: No  Attention: Distractible  Thought Processes: Circumstantial  Thought Content:Normal: Yes  Thought Content: Preoccupations  Hallucinations: None  Delusions: No  Memory:Normal: No  Memory: Poor Recent, Poor Remote  Insight and Judgment: No  Insight and Judgment: Poor Judgment, Poor Insight  Present Suicidal Ideation: No  Present Homicidal Ideation: No      Metabolic Screening:    Lab Results   Component Value Date    LABA1C 5.5 05/12/2019       Lab Results   Component Value Date    CHOL 200 (H) 04/03/2019    CHOL 212 (H) 07/30/2018     Lab Results   Component Value Date    TRIG 210 (H) 04/03/2019    TRIG 88 07/30/2018     Lab Results   Component Value Date    HDL 37 (L) 04/03/2019    HDL 37 (L) 07/30/2018     No components found for: LDLCAL  No results found for: Georgina Simmons RN

## 2020-11-18 NOTE — GROUP NOTE
Group Therapy Note    Date: 11/18/2020    Group Start Time: 1000  Group End Time: 0726  Group Topic: Recreational    [facility]    Joslyn Trujillo        Group Therapy Note    Attendees: 6/8         Patient's Goal:  Patient played Apples to Apples at the request of a peer, and engaged in conversation as they played to increase sense of community and provide leisure opportunities. Notes:  Patient came to group about 15 minutes after starting. Did not want to play and requested to just listen and watch. Patient was attentive and engaged in conversations with peers appropriately, having positive interactions. Status After Intervention:  Improved    Participation Level:  Active Listener/ minimal    Participation Quality: Appropriate, Attentive and Resistant      Speech:  normal      Thought Process/Content: Logical  Linear      Affective Functioning: Congruent      Mood: euthymic      Level of consciousness:  Alert and Attentive      Response to Learning: Able to verbalize current knowledge/experience and Progressing to goal      Endings: None Reported    Modes of Intervention: Reality-testing      Discipline Responsible: Psychoeducational Specialist      Signature:  Joslyn Trujillo

## 2020-11-18 NOTE — SUICIDE SAFETY PLAN
SAFETY PLAN    A suicide Safety Plan is a document that supports someone when they are having thoughts of suicide. Warning Signs that indicate a suicidal crisis may be developing: What (situations, thoughts, feelings, body sensations, behaviors, etc.) do you experience that lets you know you are beginning to think about suicide? 1. Depression   2. Anxiety  3. High pitched noises     Internal Coping Strategies:  What things can I do (relaxation techniques, hobbies, physical activities, etc.) to take my mind off my problems without contacting another person? 1. Deep breathing   2. Counting backwards from 25  3. Walking     People and social settings that provide distraction: Who can I call or where can I go to distract me? 1.  [facility]    Phone: [phone]     2. [address]                         Phone: [phone]    People whom I can ask for help: Who can I call when I need help - for example, friends, family, clergy, someone else? 1. Mother; Ricky Rodgers [phone]  2. Sister; Ciera Plata [phone]                                             Professionals or [facility] I can contact during a crisis: Who can I call for help - for example, my doctor, my psychiatrist, my psychologist, a mental health provider, a suicide hotline? 1. [facility] Phone: [phone]  2. [address] [place] [address]  Phone: [phone]    2. Suicide Prevention Lifeline: 9-600-877-TALK (8144)    . [address] [place] [address] Emergency Services -  for example, [address] [facility] suicide hotline, [facility] Hotline:355.554.1393    3. Rescue Crisis:        Emergency Services Address: [address] [place] [address], [place],      [facility] [address]      Phone: [phone]      Making the environment safe: How can I make my environment (house/apartment/living space) safer?  For example, can I remove guns, medications, and other items? 1. Remove unsafe objects  2.  Keep Medications in safe and secure location

## 2020-11-19 ENCOUNTER — TELEPHONE (OUTPATIENT)
Dept: FAMILY MEDICINE CLINIC | Age: 30
End: 2020-11-19
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
## 2020-11-20 NOTE — TELEPHONE ENCOUNTER
[facility] Trans[facility] Initial Follow Up Call    Outreach made within 2 business days of discharge: Yes    Patient: Amrit Steiner Patient : 1990   MRN: [id number]  Reason for Admission: Bipolar I  Discharge Date: 20       Spoke with: pt    Discharge department/facility: [facility], 20    TCM Interactive Patient Contact:  Was patient able to fill all prescriptions: Yes  Was patient instructed to bring all medications to the follow-up visit: Yes  Is patient taking all medications as directed in the discharge summary?  Yes  Does patient understand their discharge instructions: Yes  Does patient have questions or concerns that need addressed prior to 7-14 day follow up office visit: no    Scheduled appointment with PCP within 7-14 days    Follow Up  Future Appointments   Date Time Provider Deo Malcolm   2020  2:00 PM MD VU Juares   3/15/2021  3:00 PM MD VU Juares LOUISA Nelson LPN

## 2020-12-03 ENCOUNTER — TELEPHONE (OUTPATIENT)
Dept: FAMILY MEDICINE CLINIC | Age: 30
End: 2020-12-03

## 2020-12-03 NOTE — TELEPHONE ENCOUNTER
Mom states pt was over-medicated, and pt now just takes 2 for his anxiety, pt went to [id number] [facility] ER, transferred to [facility] and kept for a week, mom would like to discuss this with nurse, please call at above number.

## 2021-01-06 NOTE — TELEPHONE ENCOUNTER
Pt's old psychiatrist used to prescribe this but he no longer sees them and questions if [facility] will prescribe since it's not a psych med.

## 2021-01-07 RX ORDER — PROPRANOLOL HYDROCHLORIDE 10 MG/1
10 TABLET ORAL 3 TIMES DAILY
Qty: 90 TABLET | Refills: 1 | Status: SHIPPED | OUTPATIENT
Start: 2021-01-07 | End: 2021-06-17

## 2021-01-07 NOTE — TELEPHONE ENCOUNTER
Do you want to assume RX?     Lonny Marks called requesting a refill of the below medication which has been pended for you:     Requested Prescriptions     Pending Prescriptions Disp Refills    propranolol (INDERAL) 10 MG tablet 90 tablet 1     Sig: Take 1 tablet by mouth 3 times daily       Last Appointment Date: 11/30/2020  Next Appointment Date: 3/15/2021    Allergies   Allergen Reactions    Haldol [Haloperidol]      EPS    Zoloft [Sertraline Hcl] Other (See Comments)     nightmares

## 2021-03-15 ENCOUNTER — VIRTUAL VISIT (OUTPATIENT)
Dept: FAMILY MEDICINE CLINIC | Age: 31
End: 2021-03-15
Payer: MEDICARE

## 2021-03-15 ENCOUNTER — TELEPHONE (OUTPATIENT)
Dept: FAMILY MEDICINE CLINIC | Age: 31
End: 2021-03-15

## 2021-03-15 DIAGNOSIS — F33.2 SEVERE EPISODE OF RECURRENT MAJOR DEPRESSIVE DISORDER, WITHOUT PSYCHOTIC FEATURES (HCC): ICD-10-CM

## 2021-03-15 DIAGNOSIS — F31.60 BIPOLAR I DISORDER, MOST RECENT EPISODE (OR CURRENT) MIXED (HCC): ICD-10-CM

## 2021-03-15 DIAGNOSIS — F33.3 MDD (MAJOR DEPRESSIVE DISORDER), RECURRENT, SEVERE, WITH PSYCHOSIS (HCC): ICD-10-CM

## 2021-03-15 DIAGNOSIS — F32.2 SEVERE MAJOR DEPRESSION (HCC): ICD-10-CM

## 2021-03-15 DIAGNOSIS — F19.10 DRUG ABUSE (HCC): ICD-10-CM

## 2021-03-15 DIAGNOSIS — F12.188 CANNABIS HYPEREMESIS SYNDROME CONCURRENT WITH AND DUE TO CANNABIS ABUSE (HCC): ICD-10-CM

## 2021-03-15 DIAGNOSIS — K21.9 GASTROESOPHAGEAL REFLUX DISEASE WITHOUT ESOPHAGITIS: Primary | ICD-10-CM

## 2021-03-15 DIAGNOSIS — N17.9 ACUTE KIDNEY INJURY (HCC): ICD-10-CM

## 2021-03-15 DIAGNOSIS — Z13.220 SCREENING CHOLESTEROL LEVEL: ICD-10-CM

## 2021-03-15 PROCEDURE — 1036F TOBACCO NON-USER: CPT | Performed by: FAMILY MEDICINE

## 2021-03-15 PROCEDURE — 99214 OFFICE O/P EST MOD 30 MIN: CPT | Performed by: FAMILY MEDICINE

## 2021-03-15 PROCEDURE — G8427 DOCREV CUR MEDS BY ELIG CLIN: HCPCS | Performed by: FAMILY MEDICINE

## 2021-03-15 PROCEDURE — 99212 OFFICE O/P EST SF 10 MIN: CPT

## 2021-03-15 PROCEDURE — G8417 CALC BMI ABV UP PARAM F/U: HCPCS | Performed by: FAMILY MEDICINE

## 2021-03-15 PROCEDURE — G8484 FLU IMMUNIZE NO ADMIN: HCPCS | Performed by: FAMILY MEDICINE

## 2021-03-15 RX ORDER — SUCRALFATE 1 G/1
1 TABLET ORAL 4 TIMES DAILY
Qty: 120 TABLET | Refills: 5 | Status: SHIPPED | OUTPATIENT
Start: 2021-03-15 | End: 2022-08-12

## 2021-03-15 RX ORDER — ATORVASTATIN CALCIUM 20 MG/1
20 TABLET, FILM COATED ORAL DAILY
COMMUNITY
End: 2021-03-24 | Stop reason: SDUPTHER

## 2021-03-15 RX ORDER — CHOLECALCIFEROL (VITAMIN D3) 125 MCG
5 CAPSULE ORAL DAILY
COMMUNITY
End: 2021-09-23

## 2021-03-15 RX ORDER — PANTOPRAZOLE SODIUM 20 MG/1
20 TABLET, DELAYED RELEASE ORAL 2 TIMES DAILY
Qty: 60 TABLET | Refills: 5 | Status: SHIPPED | OUTPATIENT
Start: 2021-03-15 | End: 2021-08-18 | Stop reason: ALTCHOICE

## 2021-03-15 ASSESSMENT — ENCOUNTER SYMPTOMS
CHEST TIGHTNESS: 0
COUGH: 0
SHORTNESS OF BREATH: 0
WHEEZING: 0

## 2021-03-15 NOTE — PROGRESS NOTES
3/15/2021    TELEHEALTH EVALUATION -- Audio/Visual (During EBSKY-92 public health emergency)    HPI: Seen today via video visit while he was at home and I was at work. Jason Watts (:  1990) has requested an audio/video evaluation for the following concern(s): Anxiety: stable; he has been doing a little better. He did have a severe panic attack today prior to his appointment so he wanted to have his visit switched to video. He has been doing a lot better recently. He has had a lot of his medication discontinued. He is going to counseling once a week. He is doing MDRA therapy. He has been sleeping pretty well with melatonin. He is seeing his psychiatrist every month or too. He wasn't taking his meds for a few days which was then making him feel sick, so he started taking them regularly again for about a week. He has still been using marijuana fairly regularly. He is still living with his mom. GERD: improving; he has been doing better as far as his stomach is concerned. He is not having too much issue with abdominal pain. He has not had any issues with diarrhea recently. He has not had any blood in the stool. Review of Systems   Constitutional: Negative for activity change, appetite change, chills, fatigue and fever. Respiratory: Negative for cough, chest tightness, shortness of breath (occasionally with panic attacks) and wheezing. Cardiovascular: Negative for chest pain, palpitations and leg swelling. Genitourinary: Negative for difficulty urinating. Skin: Negative for rash. Neurological: Positive for headaches (occasionally). Negative for dizziness, syncope, weakness and light-headedness. Psychiatric/Behavioral: Positive for dysphoric mood and sleep disturbance. The patient is nervous/anxious. Prior to Visit Medications    Medication Sig Taking? Authorizing Provider   atorvastatin (LIPITOR) 20 MG tablet Take 20 mg by mouth daily Take 1 tablet daily.  Yes Historical Provider, MD   melatonin 5 MG TABS tablet Take 5 mg by mouth daily Take 1 tablet at bedtime. Yes Historical Provider, MD   pantoprazole (PROTONIX) 20 MG tablet Take 1 tablet by mouth 2 times daily Yes Franci Monaco MD   sucralfate (CARAFATE) 1 GM tablet Take 1 tablet by mouth 4 times daily Yes Franci Monaco MD   propranolol (INDERAL) 10 MG tablet Take 1 tablet by mouth 3 times daily Yes Franci Monaco MD   divalproex (DEPAKOTE ER) 500 MG extended release tablet Take 1 tablet by mouth 2 times daily Yes Tanvi Asher MD   methIMAzole (TAPAZOLE) 10 MG tablet Take 10 mg by mouth 3 times daily Yes Historical Provider, MD   calcium carbonate (TUMS) 500 MG chewable tablet Take 200 mg by mouth daily Yes Historical Provider, MD   Cholecalciferol (VITAMIN D3) 2000 units CAPS Take 2 capsules by mouth daily  Patient not taking: Reported on 3/15/2021  Franci Monaco MD       Social History     Tobacco Use    Smoking status: Former Smoker     Packs/day: 0.50     Types: Cigarettes     Quit date: 2019     Years since quittin.8    Smokeless tobacco: Never Used    Tobacco comment: rashmi rrt 2019   Substance Use Topics    Alcohol use:  Yes     Alcohol/week: 2.0 standard drinks     Types: 2 Cans of beer per week     Comment: stopped drinking 7 months ago - 2018    Drug use: Yes     Frequency: 21.0 times per week     Types: Marijuana     Comment: 20 reports using marijuana 3 times a day, states has a medical card        Allergies   Allergen Reactions    Haldol [Haloperidol]      EPS    Zoloft [Sertraline Hcl] Other (See Comments)     nightmares   ,   Past Medical History:   Diagnosis Date    Acid reflux     Anxiety     Appendicitis     when 11years old    Diverticulitis     History of acute renal failure     due to dehydration    History of stomach ulcers     Irritable bowel syndrome     Ulcer        PHYSICAL EXAMINATION:  [ INSTRUCTIONS:  \"[x]\" Indicates a positive item  \"[]\" Indicates a negative item  -- DELETE ALL ITEMS NOT EXAMINED]  Vital Signs: (As obtained by patient/caregiver or practitioner observation)    Patient-Reported Vitals 3/15/2021   Patient-Reported Weight 171lbs   Patient-Reported Height 5 9          Constitutional: [x] Appears well-developed and well-nourished [x] No apparent distress      [] Abnormal-   Mental status  [x] Alert and awake  [x] Oriented to person/place/time [x]Able to follow commands      Eyes:  EOM    [x]  Normal  [] Abnormal-  Sclera  [x]  Normal  [] Abnormal -         Discharge [x]  None visible  [] Abnormal -    HENT:   [x] Normocephalic, atraumatic. [] Abnormal   [x] Mouth/Throat: Mucous membranes are moist.     External Ears [x] Normal  [] Abnormal-     Neck: [x] No visualized mass     Pulmonary/Chest: [x] Respiratory effort normal.  [x] No visualized signs of difficulty breathing or respiratory distress        [] Abnormal-      Musculoskeletal:           [x] Normal range of motion of neck        [] Abnormal-       Neurological:        [x] No Facial Asymmetry (Cranial nerve 7 motor function) (limited exam to video visit)         [] Abnormal-         Skin:        [x] No significant exanthematous lesions or discoloration noted on facial skin         [] Abnormal-            Psychiatric:       [x] Normal Affect [x] No Hallucinations        [] Abnormal-     Other pertinent observable physical exam findings-     ASSESSMENT/PLAN:  1. Gastroesophageal reflux disease without esophagitis  Improving; he has been doing a lot better. He is getting good relief from his protonix and carafate so I will continue them both. 2. Bipolar I disorder, most recent episode (or current) mixed ([address]  Improving; he is doing much better overall. He is following with a psychiatrist     3. Cannabis hyperemesis syndrome concurrent with and due to cannabis abuse ([address]  Stable; sees psych    4. Severe major depression ([address]  Stable; sees psych    5.  MDD (major depressive disorder), recurrent, severe, with psychosis ([address]  Stable; sees psych    6. Severe episode of recurrent major depressive disorder, without psychotic features ([address]  Stable; sees psych    7. Drug abuse ([address]  Stable; sees psych    8. Acute kidney injury ([address]  Hopefully resolved. I will check a bmp.    - Basic Metabolic Panel; Future    9. Screening cholesterol level  - Lipid Panel; Future      Return in about 6 months (around 9/15/2021) for GERD follow up. Carlton Arriaga, was evaluated through a synchronous (real-time) audio-video encounter. The patient (or guardian if applicable) is aware that this is a billable service. Verbal consent to proceed has been obtained within the past 12 months. The visit was conducted pursuant to the emergency declaration under the [address] [place] [address], [address] [place] [address] authority and the [employer] General Act. Patient identification was verified, and a caregiver was present when appropriate. The patient was located in a state where the provider was credentialed to provide care. Total time spent on this encounter: Not billed by time    --Alex Day MD on 3/15/2021 at 5:25 PM    An electronic signature was used to authenticate this note.

## 2021-03-15 NOTE — TELEPHONE ENCOUNTER
Lorraine Walton called on behalf of pt's mother to see if his 3pm office appt could be switched to a virtual visit due to the pt having a severe panic attack, Pt's mother prefers to be called at [phone], Pt's mothers name is ancelmo. Pt's mother called and stated they now want dr to call him on the [phone] number instead.

## 2021-03-23 ENCOUNTER — HOSPITAL ENCOUNTER (OUTPATIENT)
Dept: LAB | Age: 31
Discharge: HOME | End: 2021-03-23
Payer: MEDICARE

## 2021-03-23 DIAGNOSIS — Z13.220 SCREENING CHOLESTEROL LEVEL: ICD-10-CM

## 2021-03-23 DIAGNOSIS — E05.90 HYPERTHYROIDISM: ICD-10-CM

## 2021-03-23 DIAGNOSIS — N17.9 ACUTE KIDNEY INJURY (HCC): ICD-10-CM

## 2021-03-23 LAB
ANION GAP SERPL CALCULATED.3IONS-SCNC: 10 MMOL/L (ref 9–17)
BUN BLDV-MCNC: 13 MG/DL (ref 6–20)
BUN/CREAT BLD: 18 (ref 9–20)
CALCIUM SERPL-MCNC: 9.6 MG/DL (ref 8.6–10.4)
CHLORIDE BLD-SCNC: 103 MMOL/L (ref 98–107)
CHOLESTEROL/HDL RATIO: 4.4
CHOLESTEROL: 150 MG/DL
CO2: 25 MMOL/L (ref 20–31)
CREAT SERPL-MCNC: 0.74 MG/DL (ref 0.7–1.2)
GFR AFRICAN AMERICAN: >60 ML/MIN
GFR NON-AFRICAN AMERICAN: >60 ML/MIN
GFR SERPL CREATININE-BSD FRML MDRD: ABNORMAL ML/MIN/{1.73_M2}
GFR SERPL CREATININE-BSD FRML MDRD: ABNORMAL ML/MIN/{1.73_M2}
GLUCOSE BLD-MCNC: 100 MG/DL (ref 70–99)
HDLC SERPL-MCNC: 34 MG/DL
LDL CHOLESTEROL: 101 MG/DL (ref 0–130)
POTASSIUM SERPL-SCNC: 4.2 MMOL/L (ref 3.7–5.3)
SODIUM BLD-SCNC: 138 MMOL/L (ref 135–144)
TRIGL SERPL-MCNC: 77 MG/DL
TSH SERPL DL<=0.05 MIU/L-ACNC: 0.64 MIU/L (ref 0.3–5)
VALPROIC ACID LEVEL: 38 UG/ML (ref 50–125)
VALPROIC DATE LAST DOSE: ABNORMAL
VALPROIC DOSE AMOUNT: ABNORMAL
VALPROIC TIME LAST DOSE: ABNORMAL
VLDLC SERPL CALC-MCNC: ABNORMAL MG/DL (ref 1–30)

## 2021-03-23 PROCEDURE — 80164 ASSAY DIPROPYLACETIC ACD TOT: CPT

## 2021-03-23 PROCEDURE — 84443 ASSAY THYROID STIM HORMONE: CPT

## 2021-03-23 PROCEDURE — 80048 BASIC METABOLIC PNL TOTAL CA: CPT

## 2021-03-23 PROCEDURE — 36415 COLL VENOUS BLD VENIPUNCTURE: CPT

## 2021-03-23 PROCEDURE — 80061 LIPID PANEL: CPT

## 2021-03-24 RX ORDER — ATORVASTATIN CALCIUM 20 MG/1
20 TABLET, FILM COATED ORAL DAILY
Qty: 90 TABLET | Refills: 3 | Status: SHIPPED | OUTPATIENT
Start: 2021-03-24 | End: 2022-09-06

## 2021-03-24 NOTE — TELEPHONE ENCOUNTER
----- Message from Soniya Mares MD sent at 3/24/2021 11:38 AM EDT -----  Please let him know that his cholesterol is excellent, his thyroid is normal and his blood sugar and kidneys are normal

## 2021-03-24 NOTE — TELEPHONE ENCOUNTER
Pt informed, voiced understanding. Requested refill of his atorvastatin to [facility]. Joshua Damon called requesting a refill of the below medication which has been pended for you:     Requested Prescriptions     Pending Prescriptions Disp Refills    atorvastatin (LIPITOR) 20 MG tablet 90 tablet 1     Sig: Take 1 tablet by mouth daily Take 1 tablet daily.        Last Appointment Date: 3/15/2021  Next Appointment Date: 9/16/2021    Allergies   Allergen Reactions    Haldol [Haloperidol]      EPS    Zoloft [Sertraline Hcl] Other (See Comments)     nightmares

## 2021-04-01 ENCOUNTER — TELEPHONE (OUTPATIENT)
Dept: FAMILY MEDICINE CLINIC | Age: 31
End: 2021-04-01

## 2021-04-01 DIAGNOSIS — K21.00 GASTROESOPHAGEAL REFLUX DISEASE WITH ESOPHAGITIS, UNSPECIFIED WHETHER HEMORRHAGE: Primary | ICD-10-CM

## 2021-04-01 NOTE — TELEPHONE ENCOUNTER
Mother would like referral to gastroenterologist in [facility]. Is there a specific physician you would like referred to? Did advise pt (on speaker) to watch diet. Medication would not help if doesn't watch diet.

## 2021-04-01 NOTE — TELEPHONE ENCOUNTER
The only people we have here are general surgeons not gastroenterologists. They can do a lot of the same things but a true gastroenterologist is in [facility] or [place]. In general he needs to watch his diet. There isn't much we can do with meds to help him if he is eating foods like buffalo wild wings. Does he want to see general surgery? Or someone in [place]?

## 2021-04-02 ENCOUNTER — TELEPHONE (OUTPATIENT)
Dept: GASTROENTEROLOGY | Age: 31
End: 2021-04-02

## 2021-04-02 NOTE — TELEPHONE ENCOUNTER
No VM set up unable to leave message- 1st attempt  Letter sent to call and schedule appt from referral - 2nd attempt

## 2021-04-20 ENCOUNTER — TELEPHONE (OUTPATIENT)
Dept: FAMILY MEDICINE CLINIC | Age: 31
End: 2021-04-20

## 2021-04-20 DIAGNOSIS — K21.00 GASTROESOPHAGEAL REFLUX DISEASE WITH ESOPHAGITIS, UNSPECIFIED WHETHER HEMORRHAGE: Primary | ICD-10-CM

## 2021-04-20 NOTE — TELEPHONE ENCOUNTER
Mom calling stating pt was referred to [facility] in [facility] but mom states pt has anxiety and doesn't think pt can get there, can pt see gastro at [facility], please advise mom on above cell number.

## 2021-04-21 NOTE — TELEPHONE ENCOUNTER
Attempted to reach mother; does not have  ail set up. Can refer pt to [facility] or [facility] office.

## 2021-04-22 ENCOUNTER — HOSPITAL ENCOUNTER (OUTPATIENT)
Dept: LAB | Age: 31
Discharge: HOME | End: 2021-04-22
Payer: MEDICARE

## 2021-04-22 LAB
VALPROIC ACID LEVEL: 66 UG/ML (ref 50–125)
VALPROIC DATE LAST DOSE: NORMAL
VALPROIC DOSE AMOUNT: NORMAL
VALPROIC TIME LAST DOSE: NORMAL

## 2021-04-22 PROCEDURE — 80164 ASSAY DIPROPYLACETIC ACD TOT: CPT

## 2021-04-22 PROCEDURE — 36415 COLL VENOUS BLD VENIPUNCTURE: CPT

## 2021-06-17 RX ORDER — PROPRANOLOL HYDROCHLORIDE 10 MG/1
TABLET ORAL
Qty: 90 TABLET | Refills: 3 | Status: SHIPPED | OUTPATIENT
Start: 2021-06-17 | End: 2022-03-25

## 2021-06-17 NOTE — TELEPHONE ENCOUNTER
Tomer Restrepo called requesting a refill of the below medication which has been pended for you:     Requested Prescriptions     Pending Prescriptions Disp Refills    propranolol (INDERAL) 10 MG tablet [Pharmacy Med Name: Propranolol HCl 10 MG Oral Tablet] 90 tablet 1     Sig: TAKE 1 TABLET BY MOUTH THREE TIMES DAILY       Last Appointment Date: 3/15/2021  Next Appointment Date: 9/16/2021    Allergies   Allergen Reactions    Haldol [Haloperidol]      EPS    Zoloft [Sertraline Hcl] Other (See Comments)     nightmares

## 2021-06-22 ENCOUNTER — OFFICE VISIT (OUTPATIENT)
Dept: FAMILY MEDICINE CLINIC | Age: 31
End: 2021-06-22
Payer: MEDICARE

## 2021-06-22 ENCOUNTER — HOSPITAL ENCOUNTER (OUTPATIENT)
Dept: LAB | Age: 31
Discharge: HOME | End: 2021-06-22
Payer: MEDICARE

## 2021-06-22 VITALS
HEIGHT: 69 IN | OXYGEN SATURATION: 99 % | TEMPERATURE: 97.5 F | BODY MASS INDEX: 24.88 KG/M2 | WEIGHT: 168 LBS | SYSTOLIC BLOOD PRESSURE: 120 MMHG | HEART RATE: 80 BPM | DIASTOLIC BLOOD PRESSURE: 70 MMHG

## 2021-06-22 DIAGNOSIS — G25.9 MOVEMENT DISORDER: ICD-10-CM

## 2021-06-22 DIAGNOSIS — F41.0 GENERALIZED ANXIETY DISORDER WITH PANIC ATTACKS: ICD-10-CM

## 2021-06-22 DIAGNOSIS — E05.90 HYPERTHYROIDISM: ICD-10-CM

## 2021-06-22 DIAGNOSIS — F41.1 GENERALIZED ANXIETY DISORDER WITH PANIC ATTACKS: ICD-10-CM

## 2021-06-22 DIAGNOSIS — F31.60 BIPOLAR I DISORDER, MOST RECENT EPISODE (OR CURRENT) MIXED (HCC): ICD-10-CM

## 2021-06-22 PROBLEM — R56.9 SEIZURE-LIKE ACTIVITY (HCC): Status: ACTIVE | Noted: 2021-06-22

## 2021-06-22 LAB
ALBUMIN SERPL-MCNC: 4.7 G/DL (ref 3.5–5.2)
ALBUMIN/GLOBULIN RATIO: 1.9 (ref 1–2.5)
ALP BLD-CCNC: 127 U/L (ref 40–129)
ALT SERPL-CCNC: 17 U/L (ref 5–41)
ANION GAP SERPL CALCULATED.3IONS-SCNC: 10 MMOL/L (ref 9–17)
AST SERPL-CCNC: 14 U/L
BILIRUB SERPL-MCNC: 0.59 MG/DL (ref 0.3–1.2)
BUN BLDV-MCNC: 11 MG/DL (ref 6–20)
BUN/CREAT BLD: 15 (ref 9–20)
CALCIUM SERPL-MCNC: 9.6 MG/DL (ref 8.6–10.4)
CHLORIDE BLD-SCNC: 107 MMOL/L (ref 98–107)
CO2: 25 MMOL/L (ref 20–31)
CREAT SERPL-MCNC: 0.71 MG/DL (ref 0.7–1.2)
GFR AFRICAN AMERICAN: >60 ML/MIN
GFR NON-AFRICAN AMERICAN: >60 ML/MIN
GFR SERPL CREATININE-BSD FRML MDRD: ABNORMAL ML/MIN/{1.73_M2}
GFR SERPL CREATININE-BSD FRML MDRD: ABNORMAL ML/MIN/{1.73_M2}
GLUCOSE BLD-MCNC: 111 MG/DL (ref 70–99)
HCT VFR BLD CALC: 44.9 % (ref 40.7–50.3)
HEMOGLOBIN: 14.9 G/DL (ref 13–17)
MCH RBC QN AUTO: 31 PG (ref 25.2–33.5)
MCHC RBC AUTO-ENTMCNC: 33.2 G/DL (ref 25.2–33.5)
MCV RBC AUTO: 93.3 FL (ref 82.6–102.9)
NRBC AUTOMATED: 0 PER 100 WBC
PDW BLD-RTO: 13.1 % (ref 11.8–14.4)
PLATELET # BLD: 357 K/UL (ref 138–453)
PMV BLD AUTO: 9.4 FL (ref 8.1–13.5)
POTASSIUM SERPL-SCNC: 4.3 MMOL/L (ref 3.7–5.3)
RBC # BLD: 4.81 M/UL (ref 4.21–5.77)
SODIUM BLD-SCNC: 142 MMOL/L (ref 135–144)
TOTAL PROTEIN: 7.2 G/DL (ref 6.4–8.3)
TSH SERPL DL<=0.05 MIU/L-ACNC: 0.46 MIU/L (ref 0.3–5)
VALPROIC ACID, FREE: 2 UG/ML (ref 7–23)
WBC # BLD: 11.4 K/UL (ref 3.5–11.3)

## 2021-06-22 PROCEDURE — 84443 ASSAY THYROID STIM HORMONE: CPT

## 2021-06-22 PROCEDURE — G8427 DOCREV CUR MEDS BY ELIG CLIN: HCPCS | Performed by: FAMILY MEDICINE

## 2021-06-22 PROCEDURE — 85027 COMPLETE CBC AUTOMATED: CPT

## 2021-06-22 PROCEDURE — 1036F TOBACCO NON-USER: CPT | Performed by: FAMILY MEDICINE

## 2021-06-22 PROCEDURE — 36415 COLL VENOUS BLD VENIPUNCTURE: CPT

## 2021-06-22 PROCEDURE — 80165 DIPROPYLACETIC ACID FREE: CPT

## 2021-06-22 PROCEDURE — G8420 CALC BMI NORM PARAMETERS: HCPCS | Performed by: FAMILY MEDICINE

## 2021-06-22 PROCEDURE — 99213 OFFICE O/P EST LOW 20 MIN: CPT

## 2021-06-22 PROCEDURE — 99214 OFFICE O/P EST MOD 30 MIN: CPT | Performed by: FAMILY MEDICINE

## 2021-06-22 PROCEDURE — 80053 COMPREHEN METABOLIC PANEL: CPT

## 2021-06-22 RX ORDER — HYDROXYZINE 50 MG/1
50 TABLET, FILM COATED ORAL EVERY 6 HOURS PRN
COMMUNITY
End: 2021-09-23

## 2021-06-22 RX ORDER — DIVALPROEX SODIUM 125 MG/1
125 CAPSULE, COATED PELLETS ORAL NIGHTLY
COMMUNITY
End: 2021-09-23

## 2021-06-22 SDOH — ECONOMIC STABILITY: FOOD INSECURITY: WITHIN THE PAST 12 MONTHS, YOU WORRIED THAT YOUR FOOD WOULD RUN OUT BEFORE YOU GOT MONEY TO BUY MORE.: NEVER TRUE

## 2021-06-22 SDOH — ECONOMIC STABILITY: FOOD INSECURITY: WITHIN THE PAST 12 MONTHS, THE FOOD YOU BOUGHT JUST DIDN'T LAST AND YOU DIDN'T HAVE MONEY TO GET MORE.: NEVER TRUE

## 2021-06-22 ASSESSMENT — SOCIAL DETERMINANTS OF HEALTH (SDOH): HOW HARD IS IT FOR YOU TO PAY FOR THE VERY BASICS LIKE FOOD, HOUSING, MEDICAL CARE, AND HEATING?: NOT HARD AT ALL

## 2021-06-22 NOTE — PROGRESS NOTES
Not on file   Occupational History     Employer: [employer]   Tobacco Use    Smoking status: Former Smoker     Packs/day: 0.50     Types: Cigarettes     Quit date: 2019     Years since quittin.0    Smokeless tobacco: Never Used    Tobacco comment: kathyant rrt 2019   Vaping Use    Vaping Use: Some days   Substance and Sexual Activity    Alcohol use: Yes     Alcohol/week: 2.0 standard drinks     Types: 2 Cans of beer per week     Comment: stopped drinking 7 months ago - 2018    Drug use: Yes     Frequency: 21.0 times per week     Types: Marijuana     Comment: 20 reports using marijuana 3 times a day, states has a medical card    Sexual activity: Not Currently     Partners: Female   Other Topics Concern    Not on file   Social History Narrative    Not on file     Social Determinants of Health     Financial Resource Strain: Low Risk     Difficulty of Paying Living Expenses: Not hard at all   Food Insecurity: No Food Insecurity    Worried About [address] in the Last Year: Never true    [address] [employer] in the Last Year: Never true   Transportation Needs:     Lack of Transportation (Medical):      Lack of Transportation (Non-Medical):    Physical Activity:     Days of Exercise per Week:     Minutes of Exercise per Session:    Stress:     Feeling of Stress :    Social Connections:     Frequency of Communication with Friends and Family:     Frequency of Social Gatherings with Friends and Family:     Attends Taoist Services:     Active Member of Clubs or Organizations:     Attends Club or Organization Meetings:     Marital Status:    Intimate Partner Violence:     Fear of Current or Ex-Partner:     Emotionally Abused:     Physically Abused:     Sexually Abused:        Current Outpatient Medications   Medication Sig Dispense Refill    divalproex (DEPAKOTE SPRINKLE) 125 MG capsule Take 125 mg by mouth nightly      hydrOXYzine (ATARAX) 50 MG tablet Take 50 mg by mouth every 6 hours as needed for Itching      propranolol (INDERAL) 10 MG tablet TAKE 1 TABLET BY MOUTH THREE TIMES DAILY 90 tablet 3    atorvastatin (LIPITOR) 20 MG tablet Take 1 tablet by mouth daily Take 1 tablet daily. 90 tablet 3    melatonin 5 MG TABS tablet Take 5 mg by mouth daily Take 1 tablet at bedtime.  pantoprazole (PROTONIX) 20 MG tablet Take 1 tablet by mouth 2 times daily 60 tablet 5    sucralfate (CARAFATE) 1 GM tablet Take 1 tablet by mouth 4 times daily 120 tablet 5    divalproex (DEPAKOTE ER) 500 MG extended release tablet Take 1 tablet by mouth 2 times daily 60 tablet 0    calcium carbonate (TUMS) 500 MG chewable tablet Take 200 mg by mouth daily      methIMAzole (TAPAZOLE) 10 MG tablet Take 10 mg by mouth 3 times daily  (Patient not taking: Reported on 6/22/2021)       No current facility-administered medications for this visit. Allergies   Allergen Reactions    Haldol [Haloperidol]      EPS    Zoloft [Sertraline Hcl] Other (See Comments)     nightmares       REVIEW OF SYSTEMS:  General: No fevers, chills, lost about 30 lbs weight  HEENT: No double vision, blurry vision, runny nose, sore throat, tinnitus  Cardio: No chest pain, palpitations, CHATTERJEE, edema, PND  Pulmonary: No cough, hemoptysis, SOB  GI: No nausea, vomiting, dysphagia, odynophagia,has chronic diarrhea  : No dysuria, hematuria, urgency, incontinence  Musculoskeletal: No muscle or joint aches, no joint swelling  Neuro:Frequent movement when he is resting. Occasional dizziness/lightheadedness, no seizures. no tremors.   Endocrine: No polyuria, polydipsia, polyphagia, no temperature intolerance  Skin: No lesions or itching  No problems with ADLs  Sleep: Poor  Psychiatric: h/o depression and anxiety    PHYSICAL EXAM:  VS:  /70   Pulse 80   Temp 97.5 °F (36.4 °C)   Ht 5' 9\" (1.753 m)   Wt 168 lb (76.2 kg)   SpO2 99%   BMI 24.81 kg/m²   General:  Alert and oriented, NAD  HEENT:  TMs, RUT, EOMI, Conjunctivae clear Throat currently clear. NECK:  Supple without adenopathy or thyromegaly, no carotid bruits  LUNGS:  CTA all fields  HEART:  RRR without M, R, or G  ABDOMEN:  Soft and nontender without palpable abnormalities  EXTREMITIES:  Without clubbing, cyanosis, or edema, no calf tenderness  NEURO: Has frequent body movements,no tremors noted. SKIN:  warm to touch,normal texture. No active lesions. ASSESSMENT/PLAN:     Diagnosis Orders   1. Movement disorder  Valproic Acid Level, Free    CBC    Comprehensive Metabolic Panel    Lakeshia Cummings MD, Neurology, [place]   2. Hyperthyroidism  TSH With Reflex Ft4   3. Bipolar I disorder, most recent episode (or current) mixed ([address]     4. Generalized anxiety disorder with panic attacks         Orders Placed This Encounter   Procedures    TSH With Reflex Ft4     Standing Status:   Future     Standing Expiration Date:   6/22/2022    Valproic Acid Level, Free     Standing Status:   Future     Standing Expiration Date:   6/22/2022    CBC     Standing Status:   Future     Standing Expiration Date:   6/22/2022    Comprehensive Metabolic Panel     Standing Status:   Future     Standing Expiration Date:   9/08/0533   Higinio Duke MD, Neurology, [place]     Referral Priority:   Routine     Referral Type:   Eval and Treat     Referral Reason:   Specialty Services Required     Referred to Provider:   Vicki Griffith MD     Requested Specialty:   Neurology     Number of Visits Requested:   1     Requested Prescriptions      No prescriptions requested or ordered in this encounter   Check TSH,T4,CMP and Depakote level. Neurology consult. F/u with Psychiatry and gastroenterlogy. F/u with PCP for ongoing care  Return if symptoms worsen or fail to improve.     Electronically signed by Lauren Skelton MD

## 2021-06-24 ENCOUNTER — TELEPHONE (OUTPATIENT)
Dept: NEUROLOGY | Age: 31
End: 2021-06-24

## 2021-06-24 NOTE — TELEPHONE ENCOUNTER
Patient states he has not taken his Depakote since 6/20/2021. He dose not want to take it any more. Please advise. States he still has some anxiety but is still more worried about the side effects of this medication.

## 2021-08-18 RX ORDER — PANTOPRAZOLE SODIUM 20 MG/1
20 TABLET, DELAYED RELEASE ORAL 2 TIMES DAILY
Qty: 60 TABLET | Refills: 5 | Status: CANCELLED | OUTPATIENT
Start: 2021-08-18

## 2021-08-18 NOTE — TELEPHONE ENCOUNTER
Tamanna Boogie called requesting a refill of the below medication which has been pended for you:     Requested Prescriptions     Pending Prescriptions Disp Refills    pantoprazole (PROTONIX) 40 MG tablet 90 tablet 1     Sig: Take 1 tablet by mouth every morning (before breakfast)       Last Appointment Date: 3/15/2021  Next Appointment Date: 9/23/2021    Allergies   Allergen Reactions    Haldol [Haloperidol]      EPS    Zoloft [Sertraline Hcl] Other (See Comments)     nightmares

## 2021-08-19 RX ORDER — PANTOPRAZOLE SODIUM 40 MG/1
40 TABLET, DELAYED RELEASE ORAL
Qty: 90 TABLET | Refills: 1 | Status: SHIPPED | OUTPATIENT
Start: 2021-08-19 | End: 2022-04-28

## 2021-09-16 ENCOUNTER — TELEPHONE (OUTPATIENT)
Dept: FAMILY MEDICINE CLINIC | Age: 31
End: 2021-09-16

## 2021-09-16 NOTE — TELEPHONE ENCOUNTER
Attempted to reach pt; [facility]MA[facility] is not set up. Pt will need to continue both medications; as lipitor is for cholesterol and the propanolol used for B/p and/or anxiety. Pt to keep appt on 9/23 to follow up.

## 2021-09-16 NOTE — TELEPHONE ENCOUNTER
Pt called in asking if he should be taking both his lipitor and propranolol since both of them are for blood pressure, pt was advised that since he was on them per provider that yes he should be but I will put a note back to see if the nurses had anything else to say about this.

## 2021-09-23 ENCOUNTER — OFFICE VISIT (OUTPATIENT)
Dept: FAMILY MEDICINE CLINIC | Age: 31
End: 2021-09-23
Payer: MEDICARE

## 2021-09-23 VITALS
DIASTOLIC BLOOD PRESSURE: 70 MMHG | OXYGEN SATURATION: 99 % | HEART RATE: 84 BPM | SYSTOLIC BLOOD PRESSURE: 120 MMHG | WEIGHT: 153 LBS | BODY MASS INDEX: 22.66 KG/M2 | HEIGHT: 69 IN | TEMPERATURE: 97.9 F

## 2021-09-23 DIAGNOSIS — K52.9 CHRONIC DIARRHEA: Primary | ICD-10-CM

## 2021-09-23 DIAGNOSIS — E05.90 HYPERTHYROIDISM: ICD-10-CM

## 2021-09-23 DIAGNOSIS — R56.9 SEIZURE-LIKE ACTIVITY (HCC): ICD-10-CM

## 2021-09-23 PROCEDURE — 1036F TOBACCO NON-USER: CPT | Performed by: FAMILY MEDICINE

## 2021-09-23 PROCEDURE — 99212 OFFICE O/P EST SF 10 MIN: CPT

## 2021-09-23 PROCEDURE — G8420 CALC BMI NORM PARAMETERS: HCPCS | Performed by: FAMILY MEDICINE

## 2021-09-23 PROCEDURE — 99214 OFFICE O/P EST MOD 30 MIN: CPT | Performed by: FAMILY MEDICINE

## 2021-09-23 PROCEDURE — G8427 DOCREV CUR MEDS BY ELIG CLIN: HCPCS | Performed by: FAMILY MEDICINE

## 2021-09-23 RX ORDER — CYCLOBENZAPRINE HCL 5 MG
5 TABLET ORAL NIGHTLY PRN
Qty: 30 TABLET | Refills: 3 | Status: SHIPPED | OUTPATIENT
Start: 2021-09-23 | End: 2022-03-25

## 2021-09-23 RX ORDER — DICYCLOMINE HYDROCHLORIDE 10 MG/1
10 CAPSULE ORAL 4 TIMES DAILY
Qty: 120 CAPSULE | Refills: 3 | Status: SHIPPED | OUTPATIENT
Start: 2021-09-23

## 2021-09-23 ASSESSMENT — ENCOUNTER SYMPTOMS
CHEST TIGHTNESS: 0
SHORTNESS OF BREATH: 0
COUGH: 0
NAUSEA: 1
DIARRHEA: 1
ABDOMINAL PAIN: 1
WHEEZING: 0

## 2021-09-23 NOTE — PROGRESS NOTES
[facility]  [address] [address]  Dept: [phone]  Dept Fax: [phone]  Loc: [phone]    Lion Fowler is a 28 y.o. male who presents today for his medical conditions/complaints as notedbelow. Lion Fowler is c/o of   Chief Complaint   Patient presents with    6 Month Follow-Up     anxiety    Other     weight loss       HPI:     HPI Here today for a follow up of his anxiety. Anxiety: stable; He has been doing therapy. He is not seeing a psychiatrist at all right now. He is doing therapy once a week. He has been using some figit objects to help him cope with his anxiety. He is sleeping better, but he has trouble falling asleep sometimes. He is having a lot of muscle tension because of how tense his entire body is all of the time. He is trying to use his coping mechanisms and his mindful techniques to help calm himself down, but they don't always work. He has not been in the hospital in several months though. His appetite has been decreased. He has lost weight. He is getting lightheaded. He is eating some, but he can't eat much. He has some days that all he eats is toast and broth. He has diarrhea frequently. He is taking pepto regularly. It does help. He is still taking his PPI and Carafate. Recently had a colonoscopy done and egd that were normal. He is using gas-x with good relief.        Past Medical History:   Diagnosis Date    Acid reflux     Anxiety     Appendicitis     when 11years old    Diverticulitis     History of acute renal failure     due to dehydration    History of stomach ulcers     Irritable bowel syndrome     Ulcer           Social History     Tobacco Use    Smoking status: Former Smoker     Packs/day: 0.50     Types: Cigarettes     Quit date: 2019     Years since quittin.3    Smokeless tobacco: Never Used    Tobacco comment: rashmi rrt 2019 Substance Use Topics    Alcohol use: Yes     Alcohol/week: 2.0 standard drinks     Types: 2 Cans of beer per week     Comment: stopped drinking 7 months ago - Jan 2018     Current Outpatient Medications   Medication Sig Dispense Refill    dicyclomine (BENTYL) 10 MG capsule Take 1 capsule by mouth 4 times daily 120 capsule 3    cyclobenzaprine (FLEXERIL) 5 MG tablet Take 1 tablet by mouth nightly as needed for Muscle spasms 30 tablet 3    pantoprazole (PROTONIX) 40 MG tablet Take 1 tablet by mouth every morning (before breakfast) 90 tablet 1    propranolol (INDERAL) 10 MG tablet TAKE 1 TABLET BY MOUTH THREE TIMES DAILY 90 tablet 3    atorvastatin (LIPITOR) 20 MG tablet Take 1 tablet by mouth daily Take 1 tablet daily. 90 tablet 3    sucralfate (CARAFATE) 1 GM tablet Take 1 tablet by mouth 4 times daily 120 tablet 5     No current facility-administered medications for this visit. Allergies   Allergen Reactions    Haldol [Haloperidol]      EPS    Zoloft [Sertraline Hcl] Other (See Comments)     nightmares       Subjective:     Review of Systems   Constitutional: Negative for activity change, appetite change, chills, fatigue and fever. Eyes: Negative for visual disturbance. Respiratory: Negative for cough, chest tightness, shortness of breath and wheezing. Cardiovascular: Negative for chest pain, palpitations and leg swelling. Gastrointestinal: Positive for abdominal pain, diarrhea and nausea. Genitourinary: Negative for difficulty urinating. Skin: Negative for rash. Neurological: Negative for dizziness, syncope, weakness, light-headedness and headaches. Psychiatric/Behavioral: Positive for agitation, decreased concentration and sleep disturbance. The patient is nervous/anxious and is hyperactive. Objective:      Physical Exam  Vitals and nursing note reviewed. Constitutional:       General: He is not in acute distress. Appearance: He is well-developed.    Eyes:

## 2021-09-24 ENCOUNTER — HOSPITAL ENCOUNTER (OUTPATIENT)
Dept: LAB | Age: 31
Discharge: HOME | End: 2021-09-24
Payer: MEDICARE

## 2021-09-24 DIAGNOSIS — K52.9 CHRONIC DIARRHEA: ICD-10-CM

## 2021-09-24 DIAGNOSIS — E05.90 HYPERTHYROIDISM: ICD-10-CM

## 2021-09-24 LAB
ABSOLUTE EOS #: 0.05 K/UL (ref 0–0.44)
ABSOLUTE IMMATURE GRANULOCYTE: 0.03 K/UL (ref 0–0.3)
ABSOLUTE LYMPH #: 3.24 K/UL (ref 1.1–3.7)
ABSOLUTE MONO #: 0.49 K/UL (ref 0.1–1.2)
ALBUMIN SERPL-MCNC: 4.6 G/DL (ref 3.5–5.2)
ALBUMIN/GLOBULIN RATIO: 1.8 (ref 1–2.5)
ALP BLD-CCNC: 95 U/L (ref 40–129)
ALT SERPL-CCNC: 10 U/L (ref 5–41)
ANION GAP SERPL CALCULATED.3IONS-SCNC: 12 MMOL/L (ref 9–17)
AST SERPL-CCNC: 11 U/L
BASOPHILS # BLD: 1 % (ref 0–2)
BASOPHILS ABSOLUTE: 0.04 K/UL (ref 0–0.2)
BILIRUB SERPL-MCNC: 0.88 MG/DL (ref 0.3–1.2)
BUN BLDV-MCNC: 13 MG/DL (ref 6–20)
BUN/CREAT BLD: 17 (ref 9–20)
CALCIUM SERPL-MCNC: 9.6 MG/DL (ref 8.6–10.4)
CHLORIDE BLD-SCNC: 103 MMOL/L (ref 98–107)
CO2: 24 MMOL/L (ref 20–31)
CREAT SERPL-MCNC: 0.78 MG/DL (ref 0.7–1.2)
DIFFERENTIAL TYPE: ABNORMAL
EOSINOPHILS RELATIVE PERCENT: 1 % (ref 1–4)
GFR AFRICAN AMERICAN: >60 ML/MIN
GFR NON-AFRICAN AMERICAN: >60 ML/MIN
GFR SERPL CREATININE-BSD FRML MDRD: NORMAL ML/MIN/{1.73_M2}
GFR SERPL CREATININE-BSD FRML MDRD: NORMAL ML/MIN/{1.73_M2}
GLUCOSE BLD-MCNC: 98 MG/DL (ref 70–99)
HCT VFR BLD CALC: 40.8 % (ref 40.7–50.3)
HEMOGLOBIN: 13.7 G/DL (ref 13–17)
IMMATURE GRANULOCYTES: 0 %
LYMPHOCYTES # BLD: 39 % (ref 24–43)
MCH RBC QN AUTO: 30.6 PG (ref 25.2–33.5)
MCHC RBC AUTO-ENTMCNC: 33.6 G/DL (ref 25.2–33.5)
MCV RBC AUTO: 91.1 FL (ref 82.6–102.9)
MONOCYTES # BLD: 6 % (ref 3–12)
NRBC AUTOMATED: 0 PER 100 WBC
PDW BLD-RTO: 13.1 % (ref 11.8–14.4)
PLATELET # BLD: 385 K/UL (ref 138–453)
PLATELET ESTIMATE: ABNORMAL
PMV BLD AUTO: 9.2 FL (ref 8.1–13.5)
POTASSIUM SERPL-SCNC: 3.7 MMOL/L (ref 3.7–5.3)
RBC # BLD: 4.48 M/UL (ref 4.21–5.77)
RBC # BLD: ABNORMAL 10*6/UL
SEG NEUTROPHILS: 53 % (ref 36–65)
SEGMENTED NEUTROPHILS ABSOLUTE COUNT: 4.37 K/UL (ref 1.5–8.1)
SODIUM BLD-SCNC: 139 MMOL/L (ref 135–144)
TOTAL PROTEIN: 7.2 G/DL (ref 6.4–8.3)
TSH SERPL DL<=0.05 MIU/L-ACNC: 0.19 MIU/L (ref 0.3–5)
WBC # BLD: 8.2 K/UL (ref 3.5–11.3)
WBC # BLD: ABNORMAL 10*3/UL

## 2021-09-24 PROCEDURE — 84439 ASSAY OF FREE THYROXINE: CPT

## 2021-09-24 PROCEDURE — 80053 COMPREHEN METABOLIC PANEL: CPT

## 2021-09-24 PROCEDURE — 84443 ASSAY THYROID STIM HORMONE: CPT

## 2021-09-24 PROCEDURE — 85025 COMPLETE CBC W/AUTO DIFF WBC: CPT

## 2021-09-24 PROCEDURE — 36415 COLL VENOUS BLD VENIPUNCTURE: CPT

## 2021-09-25 LAB — THYROXINE, FREE: 1.37 NG/DL (ref 0.93–1.7)

## 2022-03-25 RX ORDER — PROPRANOLOL HYDROCHLORIDE 10 MG/1
TABLET ORAL
Qty: 90 TABLET | Refills: 2 | Status: SHIPPED | OUTPATIENT
Start: 2022-03-25

## 2022-03-25 RX ORDER — CYCLOBENZAPRINE HCL 5 MG
TABLET ORAL
Qty: 30 TABLET | Refills: 2 | Status: SHIPPED | OUTPATIENT
Start: 2022-03-25

## 2022-04-19 ENCOUNTER — OFFICE VISIT (OUTPATIENT)
Dept: FAMILY MEDICINE CLINIC | Age: 32
End: 2022-04-19
Payer: MEDICARE

## 2022-04-19 VITALS
SYSTOLIC BLOOD PRESSURE: 100 MMHG | HEART RATE: 62 BPM | DIASTOLIC BLOOD PRESSURE: 60 MMHG | OXYGEN SATURATION: 99 % | BODY MASS INDEX: 24.73 KG/M2 | WEIGHT: 167 LBS | HEIGHT: 69 IN | TEMPERATURE: 96.3 F

## 2022-04-19 DIAGNOSIS — F31.60 BIPOLAR I DISORDER, MOST RECENT EPISODE (OR CURRENT) MIXED (HCC): ICD-10-CM

## 2022-04-19 DIAGNOSIS — Z11.59 ENCOUNTER FOR HEPATITIS C SCREENING TEST FOR LOW RISK PATIENT: ICD-10-CM

## 2022-04-19 DIAGNOSIS — R53.82 CHRONIC FATIGUE: ICD-10-CM

## 2022-04-19 DIAGNOSIS — E78.2 MIXED HYPERLIPIDEMIA: ICD-10-CM

## 2022-04-19 DIAGNOSIS — R56.9 SEIZURE-LIKE ACTIVITY (HCC): ICD-10-CM

## 2022-04-19 DIAGNOSIS — Z23 NEED FOR HEPATITIS B VACCINATION: ICD-10-CM

## 2022-04-19 DIAGNOSIS — K58.2 IRRITABLE BOWEL SYNDROME WITH BOTH CONSTIPATION AND DIARRHEA: ICD-10-CM

## 2022-04-19 DIAGNOSIS — E03.2 HYPOTHYROIDISM DUE TO MEDICATION: Primary | ICD-10-CM

## 2022-04-19 DIAGNOSIS — F12.188 CANNABIS HYPEREMESIS SYNDROME CONCURRENT WITH AND DUE TO CANNABIS ABUSE (HCC): ICD-10-CM

## 2022-04-19 PROCEDURE — G0010 ADMIN HEPATITIS B VACCINE: HCPCS | Performed by: FAMILY MEDICINE

## 2022-04-19 PROCEDURE — G8427 DOCREV CUR MEDS BY ELIG CLIN: HCPCS | Performed by: FAMILY MEDICINE

## 2022-04-19 PROCEDURE — 90746 HEPB VACCINE 3 DOSE ADULT IM: CPT | Performed by: FAMILY MEDICINE

## 2022-04-19 PROCEDURE — 99214 OFFICE O/P EST MOD 30 MIN: CPT | Performed by: FAMILY MEDICINE

## 2022-04-19 PROCEDURE — G8420 CALC BMI NORM PARAMETERS: HCPCS | Performed by: FAMILY MEDICINE

## 2022-04-19 PROCEDURE — 99212 OFFICE O/P EST SF 10 MIN: CPT | Performed by: FAMILY MEDICINE

## 2022-04-19 PROCEDURE — 1036F TOBACCO NON-USER: CPT | Performed by: FAMILY MEDICINE

## 2022-04-19 PROCEDURE — PBSHW HEPATITIS B VACCINE ADULT (ENGERIX-B): Performed by: FAMILY MEDICINE

## 2022-04-19 ASSESSMENT — ENCOUNTER SYMPTOMS
DIARRHEA: 0
WHEEZING: 0
SHORTNESS OF BREATH: 0
BLOOD IN STOOL: 0
CHEST TIGHTNESS: 0
CONSTIPATION: 1
COUGH: 0
NAUSEA: 0
ABDOMINAL PAIN: 1

## 2022-04-19 NOTE — PROGRESS NOTES
[facility] [address] [place] [address]  Dept: [phone]  Dept Fax: [phone]  Loc: [phone]    Anabela Becerra is a 28 y.o. male who presents today for his medical conditions/complaints as noted below. Anabela Becerra is c/o of   Chief Complaint   Patient presents with    6 Month Follow-Up     Anxiety    Other     refer to GI,  referral to Dr Antonina Alcala       HPI:     HPI Here today for a follow up of his IBS, GERD, and his thyroid. His IBS is a little bit better. He has still been having a lot of issues with gas. He is using the bentyl 2-3 times a day. He is having some issues with dry mouth from it. He has some occasional issues with constipation but typically not too often. No issues with blood in the stool. He is still taking the protonix and carafate. He has had a few night sweats with nightmares but otherwise no major sweats. He has not had any hair loss. He is not having any diarrhea. He is getting tired faster than normal. He typically sleeps from 2am-7am and then he goes back to bed from 10am-2pm. He uses melatonin occasionally and his flexeril as a sleeping pill. With his therapy he had to switch therapists and he is trying to sleep differently because of working with the new therapist. He has only seen the new therapist once. He is doing something called EMDR. 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        Past Medical History:   Diagnosis Date    Acid reflux     Anxiety     Appendicitis     when 11years old    Diverticulitis     History of acute renal failure     due to dehydration    History of stomach ulcers     Irritable bowel syndrome     Ulcer           Social History     Tobacco Use    Smoking status: Former Smoker     Packs/day: 0.50     Types: Cigarettes     Quit date: 2019     Years since quittin.9    Smokeless tobacco: Never Used    Tobacco comment: rashmi rrt 2019   Substance Use Topics    Alcohol use: Yes     Alcohol/week: 2.0 standard drinks     Types: 2 Cans of beer per week     Comment: stopped drinking 7 months ago - 2018     Current Outpatient Medications   Medication Sig Dispense Refill    propranolol (INDERAL) 10 MG tablet TAKE 1 TABLET BY MOUTH THREE TIMES DAILY 90 tablet 2    cyclobenzaprine (FLEXERIL) 5 MG tablet TAKE 1 TABLET BY MOUTH NIGHTLY AS NEEDED FOR MUSCLE SPASM 30 tablet 2    dicyclomine (BENTYL) 10 MG capsule Take 1 capsule by mouth 4 times daily 120 capsule 3    pantoprazole (PROTONIX) 40 MG tablet Take 1 tablet by mouth every morning (before breakfast) 90 tablet 1    atorvastatin (LIPITOR) 20 MG tablet Take 1 tablet by mouth daily Take 1 tablet daily. 90 tablet 3    sucralfate (CARAFATE) 1 GM tablet Take 1 tablet by mouth 4 times daily 120 tablet 5     No current facility-administered medications for this visit. Allergies   Allergen Reactions    Haldol [Haloperidol]      EPS    Zoloft [Sertraline Hcl] Other (See Comments)     nightmares       Subjective:     Review of Systems   Constitutional: Negative for activity change, appetite change, chills, fatigue and fever. Eyes: Negative for visual disturbance. Respiratory: Negative for cough, chest tightness, shortness of breath and wheezing. Cardiovascular: Negative for chest pain, palpitations and leg swelling. Gastrointestinal: Positive for abdominal pain (occasionally) and constipation. Negative for blood in stool, diarrhea and nausea (much less). Genitourinary: Negative for difficulty urinating. Skin: Negative for rash. Neurological: Negative for dizziness, syncope, weakness, light-headedness and headaches. Psychiatric/Behavioral: Positive for sleep disturbance. Negative for dysphoric mood. The patient is nervous/anxious (significantly better).         Objective:      Physical Exam  Vitals and nursing note reviewed. Constitutional:       General: He is not in acute distress. Appearance: He is well-developed. Eyes:      Conjunctiva/sclera: Conjunctivae normal.   Cardiovascular:      Rate and Rhythm: Normal rate and regular rhythm. Heart sounds: Normal heart sounds. No murmur heard. Pulmonary:      Effort: Pulmonary effort is normal. No respiratory distress. Breath sounds: Normal breath sounds. No wheezing or rales. Musculoskeletal:         General: Normal range of motion. Cervical back: Normal range of motion and neck supple. Lymphadenopathy:      Cervical: No cervical adenopathy. Skin:     General: Skin is warm and dry. Findings: No rash. Neurological:      Mental Status: He is alert and oriented to person, place, and time. Psychiatric:         Mood and Affect: Mood normal.         Behavior: Behavior normal.         Thought Content: Thought content normal.         Judgment: Judgment normal.       /60   Pulse 62   Temp 96.3 °F (35.7 °C)   Ht 5' 9\" (1.753 m)   Wt 167 lb (75.8 kg)   SpO2 99%   BMI 24.66 kg/m²     Assessment:       Diagnosis Orders   1. Hypothyroidism due to medication  TSH With Reflex Ft4    Comprehensive Metabolic Panel   2. Chronic fatigue  CBC with Auto Differential   3. Irritable bowel syndrome with constipation  External Referral To Gastroenterology   4. Abnormal thyroid blood test     5. Cannabis hyperemesis syndrome concurrent with and due to cannabis abuse ([address]     6. Bipolar I disorder, most recent episode (or current) mixed ([address]     7. Seizure-like activity ([address]     8. Need for hepatitis B vaccination  Hep B Vaccine Adult (ENGERIX-B)   9. Mixed hyperlipidemia  Lipid Panel   10.  Encounter for hepatitis C screening test for low risk patient  Hepatitis C Antibody             Plan:        Hypothyroidism: worsening?; he has been having a lot of fatigue and he has previously been hypothyroid and he is not currently on any thyroid replacement. Chronic fatigue: worsening; possibly due to his sleep schedule, possibly due to his thyroid, possibly due to anemia or electrolyte disturbance so I will check labs. IBS-C: improving; his cramping is much better with the bentyl but it occasionally constipates him so we discussed miralax and colace. Cannabis hyperemesis: improving; he is vomiting significantly less so I suspect he is smoking less. Bipolar I: improving; he is currently not on any medication, but he is the calmest I have ever seen him. He is seeing a counselor regularly. Seizure-like activity: resolved; this was part of a conversion disorder due to his anxiety which has resolved as the anxiety improved. Return in about 6 months (around 10/19/2022) for thyroid follow up. Orders Placed This Encounter   Procedures    Hep B Vaccine Adult (ENGERIX-B)    TSH With Reflex Ft4     Standing Status:   Future     Standing Expiration Date:   4/19/2023    Comprehensive Metabolic Panel     Standing Status:   Future     Standing Expiration Date:   4/19/2023    CBC with Auto Differential     Standing Status:   Future     Standing Expiration Date:   4/19/2023    Lipid Panel     Standing Status:   Future     Standing Expiration Date:   4/19/2023     Order Specific Question:   Is Patient Fasting?/# of Hours     Answer:   0 per dr Mikey Solorio    Hepatitis C Antibody     Standing Status:   Future     Standing Expiration Date:   4/19/2023    External Referral To Gastroenterology     Referral Priority:   Routine     Referral Type:   Eval and Treat     Referral Reason:   Specialty Services Required     Requested Specialty:   Gastroenterology     Number of Visits Requested:   1         Patientgiven educational materials - see patient instructions. Discussed use, benefit,and side effects of prescribed medications. All patient questions answered. Ptvoiced understanding. Reviewed health maintenance.   Instructed to continue currentmedications, diet and exercise. Patient agreed with treatment plan. Follow up asdirected.      Electronically signed by Emiliano Gutierrez MD on 4/19/2022 at 2:14 PM

## 2022-04-22 LAB
ALBUMIN/GLOBULIN RATIO: 1.7 G/DL
ALBUMIN: 4.3 G/DL (ref 3.5–5)
ALP BLD-CCNC: 77 UNITS/L (ref 38–126)
ALT SERPL-CCNC: 16 UNITS/L (ref 4–50)
ANION GAP SERPL CALCULATED.3IONS-SCNC: 6.6 MMOL/L
AST SERPL-CCNC: 18 UNITS/L (ref 17–59)
BASOPHILS %: 1.18 (ref 0–3)
BASOPHILS ABSOLUTE: 0.11 (ref 0–0.3)
BILIRUB SERPL-MCNC: 0.9 MG/DL (ref 0.2–1.3)
BUN BLDV-MCNC: 10 MG/DL (ref 9–20)
CALCIUM SERPL-MCNC: 9.1 MG/DL (ref 8.4–10.2)
CHLORIDE BLD-SCNC: 106 MMOL/L (ref 98–120)
CHOLESTEROL/HDL RATIO: 4.87 RATIO (ref 0–4.5)
CHOLESTEROL: 146 MG/DL (ref 50–200)
CO2: 27 MMOL/L (ref 22–31)
CREAT SERPL-MCNC: 0.7 MG/DL (ref 0.7–1.3)
EOSINOPHILS %: 1.5 (ref 0–10)
EOSINOPHILS ABSOLUTE: 0.15 (ref 0–1.1)
GFR CALCULATED: > 60
GLOBULIN: 2.5 G/DL
GLUCOSE: 97 MG/DL (ref 75–110)
HCT VFR BLD CALC: 42 % (ref 42–52)
HDLC SERPL-MCNC: 30 MG/DL (ref 36–68)
HEMOGLOBIN: 14.3 (ref 13.8–17.8)
LDL CHOLESTEROL CALCULATED: 100.4 MG/DL (ref 0–160)
LYMPHOCYTE %: 37.01 (ref 20–51.1)
LYMPHOCYTES ABSOLUTE: 3.58 (ref 1–5.5)
MCH RBC QN AUTO: 31.8 PG (ref 28.5–32.5)
MCHC RBC AUTO-ENTMCNC: 34.1 G/DL (ref 32–37)
MCV RBC AUTO: 93.2 FL (ref 80–94)
MONOCYTES %: 6.64 (ref 1.7–9.3)
MONOCYTES ABSOLUTE: 0.64 (ref 0.1–1)
NEUTROPHILS %: 53.66 (ref 42.2–75.2)
NEUTROPHILS ABSOLUTE: 5.19 (ref 2–8.1)
PDW BLD-RTO: 12.3 % (ref 10–15.5)
PLATELET # BLD: 355 THOU/MM3 (ref 130–400)
POTASSIUM SERPL-SCNC: 3.9 MMOL/L (ref 3.6–5)
RBC: 4.5 M/UL (ref 4.7–6.1)
SODIUM BLD-SCNC: 139 MMOL/L (ref 135–145)
T4 FREE: 1.05 NG/DL (ref 0.78–2.19)
TOTAL PROTEIN, SERUM: 6.9 G/DL (ref 6.3–8.2)
TRIGL SERPL-MCNC: 78 MG/DL (ref 10–250)
TSH REFLEX FT4: 0.26 MIU/ML (ref 0.49–4.67)
VLDLC SERPL CALC-MCNC: 16 MG/DL (ref 0–50)
WBC: 9.7 THOU/ML3 (ref 4.8–10.8)

## 2022-04-23 LAB — HEPATITIS C ANTIBODY: NONREACTIVE

## 2022-04-28 RX ORDER — PANTOPRAZOLE SODIUM 40 MG/1
TABLET, DELAYED RELEASE ORAL
Qty: 90 TABLET | Refills: 1 | Status: SHIPPED | OUTPATIENT
Start: 2022-04-28

## 2022-05-24 NOTE — TELEPHONE ENCOUNTER
Patients mom notified and verbalized understanding. Qbrexza Pregnancy And Lactation Text: There is no available data on Qbrexza use in pregnant women.  There is no available data on Qbrexza use in lactation.

## 2022-08-03 ENCOUNTER — OFFICE VISIT (OUTPATIENT)
Dept: OPTOMETRY | Age: 32
End: 2022-08-03
Payer: MEDICARE

## 2022-08-03 DIAGNOSIS — H40.053 RAISED INTRAOCULAR PRESSURE OF BOTH EYES: ICD-10-CM

## 2022-08-03 DIAGNOSIS — H52.223 REGULAR ASTIGMATISM OF BOTH EYES: ICD-10-CM

## 2022-08-03 DIAGNOSIS — H53.8 BLURRED VISION, BILATERAL: Primary | ICD-10-CM

## 2022-08-03 PROCEDURE — 92015 DETERMINE REFRACTIVE STATE: CPT | Performed by: OPTOMETRIST

## 2022-08-03 PROCEDURE — 92004 COMPRE OPH EXAM NEW PT 1/>: CPT | Performed by: OPTOMETRIST

## 2022-08-03 PROCEDURE — 99212 OFFICE O/P EST SF 10 MIN: CPT | Performed by: OPTOMETRIST

## 2022-08-03 PROCEDURE — 1036F TOBACCO NON-USER: CPT | Performed by: OPTOMETRIST

## 2022-08-03 PROCEDURE — G8427 DOCREV CUR MEDS BY ELIG CLIN: HCPCS | Performed by: OPTOMETRIST

## 2022-08-03 PROCEDURE — G8420 CALC BMI NORM PARAMETERS: HCPCS | Performed by: OPTOMETRIST

## 2022-08-03 ASSESSMENT — VISUAL ACUITY
OD_SC: 20/20 OU
OD_PH_SC: 20/20 OU
METHOD: SNELLEN - LINEAR
OD_SC: 20/20
OS_SC: 20/20

## 2022-08-03 ASSESSMENT — TONOMETRY
OS_IOP_MMHG: 25
OD_IOP_MMHG: 23
IOP_METHOD: NON-CONTACT AIR PUFF

## 2022-08-03 ASSESSMENT — REFRACTION_MANIFEST
OD_CYLINDER: DS
OD_SPHERE: +0.50
OS_SPHERE: +0.25
OS_CYLINDER: DS

## 2022-08-03 ASSESSMENT — ENCOUNTER SYMPTOMS
GASTROINTESTINAL NEGATIVE: 0
RESPIRATORY NEGATIVE: 0
ALLERGIC/IMMUNOLOGIC NEGATIVE: 0
EYES NEGATIVE: 0

## 2022-08-03 ASSESSMENT — SLIT LAMP EXAM - LIDS
COMMENTS: NORMAL
COMMENTS: NORMAL

## 2022-08-03 NOTE — PROGRESS NOTES
Tonye Ganser presents today for   Chief Complaint   Patient presents with    Vision Exam     Last Vision Exam: 10+ Years ago  Last Ophthalmology Exam: na  Last Filled Glasses Rx: na  Insurance: Medicare/Medicaid  Update: Exam and glasses if needed. Blurred Vision   . HPI       Vision Exam              Comments: Last Vision Exam: 10+ Years ago  Last Ophthalmology Exam: na  Last Filled Glasses Rx: na  Insurance: Medicare/Medicaid  Update: Exam and glasses if needed. Blurred Vision              Context: distance vision                   Current Outpatient Medications   Medication Sig Dispense Refill    pantoprazole (PROTONIX) 40 MG tablet TAKE 1 TABLET BY MOUTH ONCE DAILY IN THE MORNING BEFORE BREAKFAST 90 tablet 1    propranolol (INDERAL) 10 MG tablet TAKE 1 TABLET BY MOUTH THREE TIMES DAILY 90 tablet 2    cyclobenzaprine (FLEXERIL) 5 MG tablet TAKE 1 TABLET BY MOUTH NIGHTLY AS NEEDED FOR MUSCLE SPASM 30 tablet 2    dicyclomine (BENTYL) 10 MG capsule Take 1 capsule by mouth 4 times daily 120 capsule 3    atorvastatin (LIPITOR) 20 MG tablet Take 1 tablet by mouth daily Take 1 tablet daily. 90 tablet 3    sucralfate (CARAFATE) 1 GM tablet Take 1 tablet by mouth 4 times daily 120 tablet 5     No current facility-administered medications for this visit.          Family History   Problem Relation Age of Onset    Kidney Disease Mother     Other Mother         Gastroesophageal reflux disease    Allergies Brother     Cancer Maternal Uncle         Great-uncle    Crohn's Disease Maternal Uncle     Other Father         drug overdose     Social History     Socioeconomic History    Marital status: Single     Spouse name: None    Number of children: None    Years of education: None    Highest education level: None   Occupational History     Employer: [employer]   Tobacco Use    Smoking status: Former     Packs/day: 0.50     Types: Cigarettes     Quit date: 5/19/2019     Years since quitting: 3.2    Smokeless tobacco: Never    Tobacco comments:     rashmi rrt 2019   Vaping Use    Vaping Use: Some days   Substance and Sexual Activity    Alcohol use:  Yes     Alcohol/week: 2.0 standard drinks     Types: 2 Cans of beer per week     Comment: stopped drinking 7 months ago - 2018    Drug use: Yes     Frequency: 21.0 times per week     Types: Marijuana Maurita Handsome)     Comment: 20 reports using marijuana 3 times a day, states has a medical card    Sexual activity: Not Currently     Partners: Female     Past Medical History:   Diagnosis Date    Acid reflux     Anxiety     Appendicitis     when 11years old    Diverticulitis     History of acute renal failure     due to dehydration    History of stomach ulcers     Irritable bowel syndrome     Ulcer        ROS    Negative for: Constitutional, Gastrointestinal, Neurological, Skin, Genitourinary, Musculoskeletal, HENT, Endocrine, Cardiovascular, Eyes, Respiratory, Psychiatric, Allergic/Imm, Heme/Lymph         Main Ophthalmology Exam       External Exam         Right Left    External Normal Normal              Slit Lamp Exam         Right Left    Lids/Lashes Normal Normal    Conjunctiva/Sclera White and quiet White and quiet    Cornea Clear Clear    Anterior Chamber Deep and quiet Deep and quiet    Iris Round and reactive Round and reactive    Lens Clear Clear    Vitreous Normal Normal              Fundus Exam         Right Left    Disc Normal Normal    C/D Ratio 0.15 0.15    Macula Normal Normal    Vessels Normal Normal    Periphery Normal Normal                   <div id=\"MAIN_EXAM_REVIEWED\"></div>      Tonometry       Tonometry (Non-contact air puff, 4:03 PM)         Right Left    Pressure 23 25   IOP.8                        21.7  CH:  7.5         7.0            WS: 6.8          6.5                   Not recorded       Not recorded         Visual Acuity (Snellen - Linear)         Right Left    Dist sc 20/20 20/20    Dist ph sc 20/20 OU     Near sc 20/20 OU Not recorded       Not recorded       Not recorded           Ophthalmology Exam       Wearing Rx         Sphere    Right none     Left                   Manifest Refraction       Manifest Refraction         Sphere Cylinder Axis    Right +0.50 ds     Left +0.25 ds               Manifest Refraction #2 (Auto)         Sphere Cylinder Axis    Right +0.00 -0.25 078    Left +0.25 0.00 0                   Final Rx         Sphere Cylinder    Right +0.50 ds    Left +0.25 ds      Type: SVL    Expiration Date: 8/3/2024              No orders of the defined types were placed in this encounter. IMPRESSION:  1. Blurred vision, bilateral    2. Regular astigmatism of both eyes    3. Raised intraocular pressure of both eyes        PLAN:    No glasses are needed   2. \"  3. Return in 3 months for iop check (tonopen if needed)   Having trouble getting note to refresh to reflect 3 months f/u   There are no Patient Instructions on file for this visit.    Return in about 2 years (around 8/3/2024) for complete eye exam.

## 2022-08-12 DIAGNOSIS — K58.2 IRRITABLE BOWEL SYNDROME WITH BOTH CONSTIPATION AND DIARRHEA: Primary | ICD-10-CM

## 2022-08-12 RX ORDER — SUCRALFATE 1 G/1
1 TABLET ORAL 4 TIMES DAILY
Qty: 120 TABLET | Refills: 3 | Status: SHIPPED | OUTPATIENT
Start: 2022-08-12

## 2022-08-12 NOTE — TELEPHONE ENCOUNTER
Ramiro Cano called requesting a refill of the below medication which has been pended for you:     Requested Prescriptions     Pending Prescriptions Disp Refills    sucralfate (CARAFATE) 1 GM tablet [Pharmacy Med Name: Sucralfate 1 GM Oral Tablet] 120 tablet 0     Sig: Take 1 tablet by mouth 4 times daily       Last Appointment Date: 4/19/2022  Next Appointment Date: 9/8/2022    Allergies   Allergen Reactions    Haldol [Haloperidol]      EPS    Zoloft [Sertraline Hcl] Other (See Comments)     nightmares

## 2022-09-06 RX ORDER — ATORVASTATIN CALCIUM 20 MG/1
TABLET, FILM COATED ORAL
Qty: 30 TABLET | Refills: 1 | Status: SHIPPED | OUTPATIENT
Start: 2022-09-06

## 2022-09-06 NOTE — TELEPHONE ENCOUNTER
Obdulia Sarkar called requesting a refill of the below medication which has been pended for you:     Requested Prescriptions     Pending Prescriptions Disp Refills    atorvastatin (LIPITOR) 20 MG tablet [Pharmacy Med Name: Atorvastatin Calcium 20 MG Oral Tablet] 30 tablet 1     Sig: Take 1 tablet by mouth once daily       Last Appointment Date: 4/19/2022  Next Appointment Date: 9/8/2022    Allergies   Allergen Reactions    Haldol [Haloperidol]      EPS    Zoloft [Sertraline Hcl] Other (See Comments)     nightmares

## 2022-09-08 ENCOUNTER — HOSPITAL ENCOUNTER (OUTPATIENT)
Age: 32
Setting detail: SPECIMEN
Discharge: HOME | End: 2022-09-08
Payer: MEDICARE

## 2022-09-08 ENCOUNTER — OFFICE VISIT (OUTPATIENT)
Dept: FAMILY MEDICINE CLINIC | Age: 32
End: 2022-09-08
Payer: MEDICARE

## 2022-09-08 VITALS
OXYGEN SATURATION: 98 % | DIASTOLIC BLOOD PRESSURE: 70 MMHG | HEIGHT: 69 IN | WEIGHT: 160 LBS | TEMPERATURE: 97.2 F | BODY MASS INDEX: 23.7 KG/M2 | SYSTOLIC BLOOD PRESSURE: 118 MMHG | HEART RATE: 92 BPM

## 2022-09-08 DIAGNOSIS — M22.2X2 PATELLOFEMORAL PAIN SYNDROME OF LEFT KNEE: Primary | ICD-10-CM

## 2022-09-08 DIAGNOSIS — R35.0 FREQUENT URINATION: ICD-10-CM

## 2022-09-08 LAB
BACTERIA: ABNORMAL
BILIRUBIN URINE: NEGATIVE
EPITHELIAL CELLS UA: ABNORMAL /HPF (ref 0–5)
GLUCOSE URINE: NEGATIVE
KETONES, URINE: NEGATIVE
LEUKOCYTE ESTERASE, URINE: NEGATIVE
MUCUS: ABNORMAL
NITRITE, URINE: NEGATIVE
PH UA: 7 (ref 5–6)
PROTEIN UA: NEGATIVE
RBC UA: ABNORMAL /HPF (ref 0–4)
SPECIFIC GRAVITY UA: 1.02 (ref 1.01–1.02)
URINE HGB: NEGATIVE
UROBILINOGEN, URINE: NORMAL
WBC UA: ABNORMAL /HPF (ref 0–4)

## 2022-09-08 PROCEDURE — 99214 OFFICE O/P EST MOD 30 MIN: CPT | Performed by: FAMILY MEDICINE

## 2022-09-08 PROCEDURE — G8427 DOCREV CUR MEDS BY ELIG CLIN: HCPCS | Performed by: FAMILY MEDICINE

## 2022-09-08 PROCEDURE — G8420 CALC BMI NORM PARAMETERS: HCPCS | Performed by: FAMILY MEDICINE

## 2022-09-08 PROCEDURE — 99213 OFFICE O/P EST LOW 20 MIN: CPT

## 2022-09-08 PROCEDURE — 87086 URINE CULTURE/COLONY COUNT: CPT

## 2022-09-08 PROCEDURE — 81001 URINALYSIS AUTO W/SCOPE: CPT

## 2022-09-08 PROCEDURE — 1036F TOBACCO NON-USER: CPT | Performed by: FAMILY MEDICINE

## 2022-09-08 SDOH — ECONOMIC STABILITY: FOOD INSECURITY: WITHIN THE PAST 12 MONTHS, YOU WORRIED THAT YOUR FOOD WOULD RUN OUT BEFORE YOU GOT MONEY TO BUY MORE.: PATIENT DECLINED

## 2022-09-08 SDOH — ECONOMIC STABILITY: FOOD INSECURITY: WITHIN THE PAST 12 MONTHS, THE FOOD YOU BOUGHT JUST DIDN'T LAST AND YOU DIDN'T HAVE MONEY TO GET MORE.: PATIENT DECLINED

## 2022-09-08 ASSESSMENT — PATIENT HEALTH QUESTIONNAIRE - PHQ9
10. IF YOU CHECKED OFF ANY PROBLEMS, HOW DIFFICULT HAVE THESE PROBLEMS MADE IT FOR YOU TO DO YOUR WORK, TAKE CARE OF THINGS AT HOME, OR GET ALONG WITH OTHER PEOPLE: 1
9. THOUGHTS THAT YOU WOULD BE BETTER OFF DEAD, OR OF HURTING YOURSELF: 0
3. TROUBLE FALLING OR STAYING ASLEEP: 1
1. LITTLE INTEREST OR PLEASURE IN DOING THINGS: 0
7. TROUBLE CONCENTRATING ON THINGS, SUCH AS READING THE NEWSPAPER OR WATCHING TELEVISION: 1
SUM OF ALL RESPONSES TO PHQ QUESTIONS 1-9: 9
6. FEELING BAD ABOUT YOURSELF - OR THAT YOU ARE A FAILURE OR HAVE LET YOURSELF OR YOUR FAMILY DOWN: 0
SUM OF ALL RESPONSES TO PHQ QUESTIONS 1-9: 9
SUM OF ALL RESPONSES TO PHQ QUESTIONS 1-9: 9
5. POOR APPETITE OR OVEREATING: 1
SUM OF ALL RESPONSES TO PHQ QUESTIONS 1-9: 9
4. FEELING TIRED OR HAVING LITTLE ENERGY: 3
8. MOVING OR SPEAKING SO SLOWLY THAT OTHER PEOPLE COULD HAVE NOTICED. OR THE OPPOSITE, BEING SO FIGETY OR RESTLESS THAT YOU HAVE BEEN MOVING AROUND A LOT MORE THAN USUAL: 3
2. FEELING DOWN, DEPRESSED OR HOPELESS: 0
SUM OF ALL RESPONSES TO PHQ9 QUESTIONS 1 & 2: 0

## 2022-09-08 ASSESSMENT — ENCOUNTER SYMPTOMS
VOMITING: 0
CHEST TIGHTNESS: 0
NAUSEA: 1
COUGH: 0
WHEEZING: 0
SHORTNESS OF BREATH: 0

## 2022-09-08 ASSESSMENT — SOCIAL DETERMINANTS OF HEALTH (SDOH): HOW HARD IS IT FOR YOU TO PAY FOR THE VERY BASICS LIKE FOOD, HOUSING, MEDICAL CARE, AND HEATING?: PATIENT DECLINED

## 2022-09-08 NOTE — PROGRESS NOTES
Urine culture    [facility] [address]  [address] [address]  Dept: [phone]  Dept Fax: [phone]  Loc: [phone]    Alexandra Diaz is a 33 y.o. male who presents today for his medical conditions/complaints as noted below. Alexandra Diaz is c/o of   Chief Complaint   Patient presents with    Knee Pain     Left knee; when jogs in place, no injury    Anxiety    Urinary Frequency       HPI:     Here today for knee pain and urinary frequency    Knee Pain   The incident occurred more than 1 week ago. There was no injury mechanism. The pain is present in the left ankle. The quality of the pain is described as aching. The pain is at a severity of 4/10. The pain is moderate. The pain has been Intermittent since onset. Pertinent negatives include no inability to bear weight, loss of motion, loss of sensation, muscle weakness, numbness or tingling. He reports no foreign bodies present. The symptoms are aggravated by movement. He has tried rest and heat (epsom salt baths) for the symptoms. The treatment provided moderate relief. Urinary Frequency   This is a new problem. The current episode started more than 1 month ago. The problem occurs intermittently. The problem has been gradually worsening. The pain is at a severity of 1/10. The pain is mild. There has been no fever. He is Sexually active. There is No history of pyelonephritis. Associated symptoms include frequency, nausea and urgency. Pertinent negatives include no chills, discharge, hematuria, hesitancy, possible pregnancy or vomiting. He has tried nothing for the symptoms. The treatment provided no relief. He has been getting lightheaded when he has bowel movements. He has been drinking a lot of water because he has been running a lot to help with his anxiety. He also has lost weight because of all of his running.      Past Medical History:   Diagnosis Date Acid reflux     Anxiety     Appendicitis     when 11years old    Diverticulitis     History of acute renal failure     due to dehydration    History of stomach ulcers     Irritable bowel syndrome     Ulcer           Social History     Tobacco Use    Smoking status: Former     Packs/day: 0.50     Types: Cigarettes     Quit date: 5/19/2019     Years since quitting: 3.3    Smokeless tobacco: Never    Tobacco comments:     rashmi rrt 8/7/2019   Substance Use Topics    Alcohol use: Yes     Alcohol/week: 2.0 standard drinks     Types: 2 Cans of beer per week     Comment: stopped drinking 7 months ago - Jan 2018     Current Outpatient Medications   Medication Sig Dispense Refill    atorvastatin (LIPITOR) 20 MG tablet Take 1 tablet by mouth once daily 30 tablet 1    sucralfate (CARAFATE) 1 GM tablet Take 1 tablet by mouth 4 times daily 120 tablet 3    pantoprazole (PROTONIX) 40 MG tablet TAKE 1 TABLET BY MOUTH ONCE DAILY IN THE MORNING BEFORE BREAKFAST 90 tablet 1    propranolol (INDERAL) 10 MG tablet TAKE 1 TABLET BY MOUTH THREE TIMES DAILY 90 tablet 2    cyclobenzaprine (FLEXERIL) 5 MG tablet TAKE 1 TABLET BY MOUTH NIGHTLY AS NEEDED FOR MUSCLE SPASM 30 tablet 2    dicyclomine (BENTYL) 10 MG capsule Take 1 capsule by mouth 4 times daily 120 capsule 3     No current facility-administered medications for this visit. Allergies   Allergen Reactions    Haldol [Haloperidol]      EPS    Zoloft [Sertraline Hcl] Other (See Comments)     nightmares    Lorazepam      Other reaction(s): Unknown       Subjective:     Review of Systems   Constitutional:  Negative for activity change, appetite change, chills, fatigue and fever. Respiratory:  Negative for cough, chest tightness, shortness of breath and wheezing. Cardiovascular:  Negative for chest pain, palpitations and leg swelling. Gastrointestinal:  Positive for nausea. Negative for vomiting. Genitourinary:  Positive for frequency and urgency.  Negative for difficulty urinating, hematuria and hesitancy. Musculoskeletal:  Positive for arthralgias (left knee). Negative for joint swelling. Skin:  Negative for rash. Neurological:  Negative for dizziness, tingling, syncope, weakness, light-headedness and numbness. Psychiatric/Behavioral:  Positive for sleep disturbance. The patient is nervous/anxious. Objective:      Physical Exam  Vitals and nursing note reviewed. Constitutional:       General: He is not in acute distress. Appearance: He is well-developed. Eyes:      Conjunctiva/sclera: Conjunctivae normal.   Cardiovascular:      Rate and Rhythm: Normal rate and regular rhythm. Heart sounds: Normal heart sounds. No murmur heard. Pulmonary:      Effort: Pulmonary effort is normal. No respiratory distress. Breath sounds: Normal breath sounds. No wheezing or rales. Musculoskeletal:         General: Normal range of motion. Cervical back: Normal range of motion and neck supple. Lymphadenopathy:      Cervical: No cervical adenopathy. Skin:     General: Skin is warm and dry. Findings: No rash. Neurological:      Mental Status: He is alert and oriented to person, place, and time. /70   Pulse 92   Temp 97.2 °F (36.2 °C)   Ht 5' 9\" (1.753 m)   Wt 160 lb (72.6 kg)   SpO2 98%   BMI 23.63 kg/m²     Assessment:       Diagnosis Orders   1. Patellofemoral pain syndrome of left knee        2.  Frequent urination  Urinalysis with Reflex to Culture    Culture, Urine         Hospital Outpatient Visit on 09/08/2022   Component Date Value Ref Range Status    Glucose, Ur 09/08/2022 NEGATIVE  NEGATIVE Final    Bilirubin Urine 09/08/2022 NEGATIVE  NEGATIVE Final    Ketones, Urine 09/08/2022 NEGATIVE  NEGATIVE Final    Specific Gravity, UA 09/08/2022 1.020  1.010 - 1.025 Final    Urine Hgb 09/08/2022 NEGATIVE  NEGATIVE Final    pH, UA 09/08/2022 7.0 (A) 5.0 - 6.0 Final    Protein, UA 09/08/2022 NEGATIVE  NEGATIVE Final    Urobilinogen, Urine 09/08/2022 Normal  Normal Final    Nitrite, Urine 09/08/2022 NEGATIVE  NEGATIVE Final    Leukocyte Esterase, Urine 09/08/2022 NEGATIVE  NEGATIVE Final    WBC, UA 09/08/2022 0 TO 4  0 - 4 /HPF Final    RBC, UA 09/08/2022 None  0 - 4 /HPF Final    Epithelial Cells UA 09/08/2022 None  0 - 5 /HPF Final    Bacteria, UA 09/08/2022 2+ (A) None Final    Mucus, UA 09/08/2022 4+ (A) None Final            Plan:       PFS: new; seems to be due the excessive amount of running he has been doing. I recommended a different form of exercise including biking and he was given stretches to try to help. Frequent urination: new; his urine was negative for infection so this is most likely due to anxiety. I recommended discussing it with his therapist to help him manage his anxiety prior to working on desensitizing himself to the need to urinate. Return if symptoms worsen or fail to improve. Orders Placed This Encounter   Procedures    Culture, Urine     Standing Status:   Future     Number of Occurrences:   1     Standing Expiration Date:   9/8/2023     Order Specific Question:   Specify (ex-cath, midstream, cysto, etc)? Answer:   mistream    Urinalysis with Reflex to Culture     Standing Status:   Future     Number of Occurrences:   1     Standing Expiration Date:   9/8/2023     Order Specific Question:   SPECIFY(EX-CATH,MIDSTREAM,CYSTO,ETC)? Answer:   midstream           Patientgiven educational materials - see patient instructions. Discussed use, benefit,and side effects of prescribed medications. All patient questions answered. Ptvoiced understanding. Reviewed health maintenance. Instructed to continue currentmedications, diet and exercise. Patient agreed with treatment plan. Follow up asdirected.      Electronically signed by Mona Beltre MD on 9/9/2022 at 10:06 PM

## 2022-09-10 LAB
CULTURE: NO GROWTH
SPECIMEN DESCRIPTION: NORMAL

## 2023-07-18 RX ORDER — CYCLOBENZAPRINE HCL 5 MG
TABLET ORAL
Qty: 30 TABLET | Refills: 0 | OUTPATIENT
Start: 2023-07-18

## 2023-07-18 RX ORDER — PANTOPRAZOLE SODIUM 40 MG/1
TABLET, DELAYED RELEASE ORAL
Qty: 90 TABLET | Refills: 0 | OUTPATIENT
Start: 2023-07-18

## 2023-10-11 ENCOUNTER — HOSPITAL ENCOUNTER (EMERGENCY)
Age: 33
Discharge: HOME | End: 2023-10-11
Attending: EMERGENCY MEDICINE
Payer: MEDICARE

## 2023-10-11 ENCOUNTER — APPOINTMENT (OUTPATIENT)
Dept: GENERAL RADIOLOGY | Age: 33
End: 2023-10-11
Payer: MEDICARE

## 2023-10-11 VITALS
TEMPERATURE: 97.4 F | RESPIRATION RATE: 14 BRPM | HEART RATE: 62 BPM | SYSTOLIC BLOOD PRESSURE: 150 MMHG | DIASTOLIC BLOOD PRESSURE: 106 MMHG | OXYGEN SATURATION: 98 %

## 2023-10-11 DIAGNOSIS — F41.0 ANXIETY ATTACK: Primary | ICD-10-CM

## 2023-10-11 LAB
ANION GAP SERPL CALCULATED.3IONS-SCNC: 16 MMOL/L (ref 9–17)
BASOPHILS # BLD: 0.08 K/UL (ref 0–0.2)
BASOPHILS NFR BLD: 0 % (ref 0–2)
BUN SERPL-MCNC: 10 MG/DL (ref 6–20)
CALCIUM SERPL-MCNC: 9.2 MG/DL (ref 8.6–10.4)
CHLORIDE SERPL-SCNC: 105 MMOL/L (ref 98–107)
CO2 SERPL-SCNC: 20 MMOL/L (ref 20–31)
CREAT SERPL-MCNC: 0.7 MG/DL (ref 0.7–1.2)
EOSINOPHIL # BLD: <0.03 K/UL (ref 0–0.44)
EOSINOPHILS RELATIVE PERCENT: 0 % (ref 1–4)
ERYTHROCYTE [DISTWIDTH] IN BLOOD BY AUTOMATED COUNT: 12.8 % (ref 11.8–14.4)
GFR SERPL CREATININE-BSD FRML MDRD: >60 ML/MIN/1.73M2
GLUCOSE SERPL-MCNC: 112 MG/DL (ref 70–99)
HCT VFR BLD AUTO: 45 % (ref 40.7–50.3)
HGB BLD-MCNC: 14.7 G/DL (ref 13–17)
IMM GRANULOCYTES # BLD AUTO: 0.13 K/UL (ref 0–0.3)
IMM GRANULOCYTES NFR BLD: 1 %
LYMPHOCYTES NFR BLD: 2.95 K/UL (ref 1.1–3.7)
LYMPHOCYTES RELATIVE PERCENT: 14 % (ref 24–43)
MCH RBC QN AUTO: 30.6 PG (ref 25.2–33.5)
MCHC RBC AUTO-ENTMCNC: 32.7 G/DL (ref 28.4–34.8)
MCV RBC AUTO: 93.6 FL (ref 82.6–102.9)
MONOCYTES NFR BLD: 1.22 K/UL (ref 0.1–1.2)
MONOCYTES NFR BLD: 6 % (ref 3–12)
NEUTROPHILS NFR BLD: 79 % (ref 36–65)
NEUTS SEG NFR BLD: 16.42 K/UL (ref 1.5–8.1)
NRBC BLD-RTO: 0 PER 100 WBC
PLATELET # BLD AUTO: 448 K/UL (ref 138–453)
PMV BLD AUTO: 9 FL (ref 8.1–13.5)
POTASSIUM SERPL-SCNC: 4 MMOL/L (ref 3.7–5.3)
RBC # BLD AUTO: 4.81 M/UL (ref 4.21–5.77)
SODIUM SERPL-SCNC: 141 MMOL/L (ref 135–144)
TROPONIN I SERPL HS-MCNC: <6 NG/L (ref 0–22)
WBC OTHER # BLD: 20.8 K/UL (ref 3.5–11.3)

## 2023-10-11 PROCEDURE — 80048 BASIC METABOLIC PNL TOTAL CA: CPT

## 2023-10-11 PROCEDURE — 93005 ELECTROCARDIOGRAM TRACING: CPT

## 2023-10-11 PROCEDURE — 96372 THER/PROPH/DIAG INJ SC/IM: CPT

## 2023-10-11 PROCEDURE — 96361 HYDRATE IV INFUSION ADD-ON: CPT

## 2023-10-11 PROCEDURE — 84484 ASSAY OF TROPONIN QUANT: CPT

## 2023-10-11 PROCEDURE — 96374 THER/PROPH/DIAG INJ IV PUSH: CPT

## 2023-10-11 PROCEDURE — 71045 X-RAY EXAM CHEST 1 VIEW: CPT

## 2023-10-11 PROCEDURE — 99285 EMERGENCY DEPT VISIT HI MDM: CPT

## 2023-10-11 PROCEDURE — 6360000002 HC RX W HCPCS

## 2023-10-11 PROCEDURE — 85025 COMPLETE CBC W/AUTO DIFF WBC: CPT

## 2023-10-11 PROCEDURE — 2580000003 HC RX 258

## 2023-10-11 RX ORDER — LORAZEPAM 2 MG/ML
0.5 INJECTION INTRAMUSCULAR ONCE
Status: COMPLETED | OUTPATIENT
Start: 2023-10-11 | End: 2023-10-11

## 2023-10-11 RX ORDER — 0.9 % SODIUM CHLORIDE 0.9 %
1000 INTRAVENOUS SOLUTION INTRAVENOUS ONCE
Status: COMPLETED | OUTPATIENT
Start: 2023-10-11 | End: 2023-10-11

## 2023-10-11 RX ORDER — CYCLOBENZAPRINE HCL 5 MG
5 TABLET ORAL 2 TIMES DAILY PRN
Qty: 10 TABLET | Refills: 0 | Status: SHIPPED | OUTPATIENT
Start: 2023-10-11 | End: 2023-10-21

## 2023-10-11 RX ORDER — DIPHENHYDRAMINE HYDROCHLORIDE 50 MG/ML
50 INJECTION INTRAMUSCULAR; INTRAVENOUS ONCE
Status: COMPLETED | OUTPATIENT
Start: 2023-10-11 | End: 2023-10-11

## 2023-10-11 RX ADMIN — LORAZEPAM 0.5 MG: 2 INJECTION INTRAMUSCULAR; INTRAVENOUS at 15:13

## 2023-10-11 RX ADMIN — SODIUM CHLORIDE 1000 ML: 9 INJECTION, SOLUTION INTRAVENOUS at 15:15

## 2023-10-11 RX ADMIN — DIPHENHYDRAMINE HYDROCHLORIDE 50 MG: 50 INJECTION INTRAMUSCULAR; INTRAVENOUS at 14:23

## 2023-10-11 ASSESSMENT — PAIN DESCRIPTION - LOCATION: LOCATION: OTHER (COMMENT)

## 2023-10-11 ASSESSMENT — PAIN SCALES - GENERAL: PAINLEVEL_OUTOF10: 6

## 2023-10-11 ASSESSMENT — PAIN - FUNCTIONAL ASSESSMENT: PAIN_FUNCTIONAL_ASSESSMENT: 0-10

## 2023-10-11 NOTE — ED TRIAGE NOTES
Pt to ED for anxiety and mid sternal chest pain. Pt states the anxiety started today and chest pain started 3 days ago. Pt states he is having chest pain from the anxiety attack. Pt states he was playing with his kids when he thinks he pulled a muscle in his chest. Pt states he does not take any medications for his anxiety besides benadryl but did not take any today.

## 2023-10-11 NOTE — ED NOTES
Pt states he has ativan marked as an allergy due to wanting to stay away from it. Pt states he is ok taking it.       Delfin Garcia RN  10/11/23 [id number]

## 2023-10-11 NOTE — DISCHARGE INSTRUCTIONS
You were seen and evaluated in the emergency department for anxiety attack. Your symptoms resolved with minor medications and emergent causes of your symptoms were ruled out. You should follow-up with your primary care provider soon as possible for ongoing management of your medical issues. You should go to your therapist appointment tomorrow as discussed in the emergency department. You can use Flexeril as needed for your musculoskeletal chest wall pain. If you do begin to experience worsening chest pain, shortness of breath, vomiting, sweating, you should return to the emergency department for further evaluation.

## 2023-10-12 LAB
EKG ATRIAL RATE: 101 BPM
EKG Q-T INTERVAL: 360 MS
EKG QRS DURATION: 86 MS
EKG QTC CALCULATION (BAZETT): 466 MS
EKG R AXIS: 59 DEGREES
EKG T AXIS: 33 DEGREES
EKG VENTRICULAR RATE: 101 BPM

## 2024-03-05 ENCOUNTER — OFFICE VISIT (OUTPATIENT)
Dept: FAMILY MEDICINE CLINIC | Age: 34
End: 2024-03-05
Payer: MEDICARE

## 2024-03-05 ENCOUNTER — HOSPITAL ENCOUNTER (OUTPATIENT)
Age: 34
Discharge: HOME | End: 2024-03-05
Payer: MEDICARE

## 2024-03-05 VITALS
HEART RATE: 90 BPM | DIASTOLIC BLOOD PRESSURE: 62 MMHG | OXYGEN SATURATION: 97 % | HEIGHT: 69 IN | WEIGHT: 168.1 LBS | SYSTOLIC BLOOD PRESSURE: 118 MMHG | BODY MASS INDEX: 24.9 KG/M2

## 2024-03-05 DIAGNOSIS — E78.2 MIXED HYPERLIPIDEMIA: ICD-10-CM

## 2024-03-05 DIAGNOSIS — E05.90 HYPERTHYROIDISM: Primary | ICD-10-CM

## 2024-03-05 DIAGNOSIS — E05.90 HYPERTHYROIDISM: ICD-10-CM

## 2024-03-05 DIAGNOSIS — H92.01 RIGHT EAR PAIN: ICD-10-CM

## 2024-03-05 LAB
ALBUMIN SERPL-MCNC: 4.4 G/DL (ref 3.5–5.2)
ALBUMIN/GLOB SERPL: 1.8 {RATIO} (ref 1–2.5)
ALP SERPL-CCNC: 102 U/L (ref 40–129)
ALT SERPL-CCNC: 16 U/L (ref 5–41)
ANION GAP SERPL CALCULATED.3IONS-SCNC: 9 MMOL/L (ref 9–17)
AST SERPL-CCNC: 11 U/L
BILIRUB SERPL-MCNC: 0.2 MG/DL (ref 0.3–1.2)
BUN SERPL-MCNC: 14 MG/DL (ref 6–20)
BUN/CREAT SERPL: 18 (ref 9–20)
CALCIUM SERPL-MCNC: 8.9 MG/DL (ref 8.6–10.4)
CHLORIDE SERPL-SCNC: 108 MMOL/L (ref 98–107)
CHOLEST SERPL-MCNC: 192 MG/DL (ref 0–199)
CHOLESTEROL/HDL RATIO: 6
CO2 SERPL-SCNC: 24 MMOL/L (ref 20–31)
CREAT SERPL-MCNC: 0.8 MG/DL (ref 0.7–1.2)
GFR SERPL CREATININE-BSD FRML MDRD: >60 ML/MIN/1.73M2
GLUCOSE SERPL-MCNC: 91 MG/DL (ref 70–99)
HDLC SERPL-MCNC: 30 MG/DL
LDLC SERPL CALC-MCNC: 122 MG/DL (ref 0–100)
POTASSIUM SERPL-SCNC: 4.2 MMOL/L (ref 3.7–5.3)
PROT SERPL-MCNC: 6.9 G/DL (ref 6.4–8.3)
SODIUM SERPL-SCNC: 141 MMOL/L (ref 135–144)
T4 FREE SERPL-MCNC: 1 NG/DL (ref 0.9–1.7)
TRIGL SERPL-MCNC: 200 MG/DL
TSH SERPL DL<=0.05 MIU/L-ACNC: 0.21 UIU/ML (ref 0.3–5)
VLDLC SERPL CALC-MCNC: 40 MG/DL

## 2024-03-05 PROCEDURE — G8427 DOCREV CUR MEDS BY ELIG CLIN: HCPCS | Performed by: FAMILY MEDICINE

## 2024-03-05 PROCEDURE — G8484 FLU IMMUNIZE NO ADMIN: HCPCS | Performed by: FAMILY MEDICINE

## 2024-03-05 PROCEDURE — 80061 LIPID PANEL: CPT

## 2024-03-05 PROCEDURE — 84439 ASSAY OF FREE THYROXINE: CPT

## 2024-03-05 PROCEDURE — 84443 ASSAY THYROID STIM HORMONE: CPT

## 2024-03-05 PROCEDURE — 36415 COLL VENOUS BLD VENIPUNCTURE: CPT

## 2024-03-05 PROCEDURE — 99214 OFFICE O/P EST MOD 30 MIN: CPT | Performed by: FAMILY MEDICINE

## 2024-03-05 PROCEDURE — G8420 CALC BMI NORM PARAMETERS: HCPCS | Performed by: FAMILY MEDICINE

## 2024-03-05 PROCEDURE — 80053 COMPREHEN METABOLIC PANEL: CPT

## 2024-03-05 PROCEDURE — 1036F TOBACCO NON-USER: CPT | Performed by: FAMILY MEDICINE

## 2024-03-05 RX ORDER — FLUTICASONE PROPIONATE 50 MCG
1 SPRAY, SUSPENSION (ML) NASAL 2 TIMES DAILY
Qty: 16 G | Refills: 5 | Status: SHIPPED | OUTPATIENT
Start: 2024-03-05

## 2024-03-05 RX ORDER — PANTOPRAZOLE SODIUM 40 MG/1
40 TABLET, DELAYED RELEASE ORAL DAILY
Qty: 90 TABLET | Refills: 3 | Status: SHIPPED | OUTPATIENT
Start: 2024-03-05

## 2024-03-05 SDOH — ECONOMIC STABILITY: HOUSING INSECURITY
IN THE LAST 12 MONTHS, WAS THERE A TIME WHEN YOU DID NOT HAVE A STEADY PLACE TO SLEEP OR SLEPT IN A SHELTER (INCLUDING NOW)?: NO

## 2024-03-05 SDOH — ECONOMIC STABILITY: FOOD INSECURITY: WITHIN THE PAST 12 MONTHS, YOU WORRIED THAT YOUR FOOD WOULD RUN OUT BEFORE YOU GOT MONEY TO BUY MORE.: NEVER TRUE

## 2024-03-05 SDOH — ECONOMIC STABILITY: INCOME INSECURITY: HOW HARD IS IT FOR YOU TO PAY FOR THE VERY BASICS LIKE FOOD, HOUSING, MEDICAL CARE, AND HEATING?: NOT HARD AT ALL

## 2024-03-05 SDOH — ECONOMIC STABILITY: FOOD INSECURITY: WITHIN THE PAST 12 MONTHS, THE FOOD YOU BOUGHT JUST DIDN'T LAST AND YOU DIDN'T HAVE MONEY TO GET MORE.: NEVER TRUE

## 2024-03-05 ASSESSMENT — ENCOUNTER SYMPTOMS
CHEST TIGHTNESS: 0
COUGH: 1
SHORTNESS OF BREATH: 0
RHINORRHEA: 1
DIARRHEA: 0
CONSTIPATION: 0
WHEEZING: 0
NAUSEA: 1
ABDOMINAL PAIN: 0
BACK PAIN: 0

## 2024-03-05 ASSESSMENT — PATIENT HEALTH QUESTIONNAIRE - PHQ9
6. FEELING BAD ABOUT YOURSELF - OR THAT YOU ARE A FAILURE OR HAVE LET YOURSELF OR YOUR FAMILY DOWN: 0
SUM OF ALL RESPONSES TO PHQ QUESTIONS 1-9: 0
7. TROUBLE CONCENTRATING ON THINGS, SUCH AS READING THE NEWSPAPER OR WATCHING TELEVISION: 0
5. POOR APPETITE OR OVEREATING: 0
4. FEELING TIRED OR HAVING LITTLE ENERGY: 0
3. TROUBLE FALLING OR STAYING ASLEEP: 0
SUM OF ALL RESPONSES TO PHQ9 QUESTIONS 1 & 2: 0
9. THOUGHTS THAT YOU WOULD BE BETTER OFF DEAD, OR OF HURTING YOURSELF: 0
2. FEELING DOWN, DEPRESSED OR HOPELESS: 0
SUM OF ALL RESPONSES TO PHQ QUESTIONS 1-9: 0
SUM OF ALL RESPONSES TO PHQ QUESTIONS 1-9: 0
10. IF YOU CHECKED OFF ANY PROBLEMS, HOW DIFFICULT HAVE THESE PROBLEMS MADE IT FOR YOU TO DO YOUR WORK, TAKE CARE OF THINGS AT HOME, OR GET ALONG WITH OTHER PEOPLE: 0
SUM OF ALL RESPONSES TO PHQ QUESTIONS 1-9: 0
8. MOVING OR SPEAKING SO SLOWLY THAT OTHER PEOPLE COULD HAVE NOTICED. OR THE OPPOSITE, BEING SO FIGETY OR RESTLESS THAT YOU HAVE BEEN MOVING AROUND A LOT MORE THAN USUAL: 0
1. LITTLE INTEREST OR PLEASURE IN DOING THINGS: 0

## 2024-03-05 NOTE — PROGRESS NOTES
[facility]PARTMENT OF [facility]  [address] [facility] [address]  Dept: [phone]  Dept Fax: [phone]  Loc: [phone]    Anival Alberts is a 33 y.o. male who presents today for his medical conditions/complaints as noted below.  Anival Alberts is c/o of   Chief Complaint   Patient presents with    Other     1 year+ thyroid follow up;  Pain in right ear x 6-7 months       HPI:     Here today for his well visit.     Otalgia   There is pain in the right ear. This is a new problem. The current episode started more than 1 month ago (6 months). The problem occurs every few hours. The problem has been unchanged. There has been no fever. The pain is at a severity of 2/10. The pain is mild. Associated symptoms include coughing (mild, productive), headaches (occasionally), hearing loss, a rash (on his left arm) and rhinorrhea. Pertinent negatives include no abdominal pain, diarrhea or ear discharge. He has tried nothing for the symptoms. The treatment provided no relief.     He is still running a lot to help with his anxiety. He has been having some occasional knee pain but it is manageable. No issues with chest pain or shortness of breath when he is exercising. His appetite is improving. He has some intermittent headaches. He gets headaches when he gets anxious.     He had a new baby 10/18/23.     Past Medical History:   Diagnosis Date    Acid reflux     Anxiety     Appendicitis     when 7 years old    Diverticulitis     History of acute renal failure     due to dehydration    History of stomach ulcers     Irritable bowel syndrome     Ulcer           Social History     Tobacco Use    Smoking status: Former     Current packs/day: 0.00     Types: Cigarettes     Quit date: 2019     Years since quittin.8    Smokeless tobacco: Never    Tobacco comments:     syant rrt 2019   Substance Use Topics    Alcohol use: Yes     Alcohol/week: 2.0

## 2024-03-07 RX ORDER — ATORVASTATIN CALCIUM 20 MG/1
TABLET, FILM COATED ORAL
Qty: 90 TABLET | Refills: 3 | Status: SHIPPED | OUTPATIENT
Start: 2024-03-07

## 2024-03-07 NOTE — TELEPHONE ENCOUNTER
Anival called requesting a refill of the below medication which has been pended for you:     Requested Prescriptions     Pending Prescriptions Disp Refills    atorvastatin (LIPITOR) 20 MG tablet [Pharmacy Med Name: Atorvastatin Calcium 20 MG Oral Tablet] 30 tablet 0     Sig: Take 1 tablet by mouth once daily       Last Appointment Date: 3/5/2024  Next Appointment Date: 11/7/2024    Allergies   Allergen Reactions    Haldol [Haloperidol]      EPS    Zoloft [Sertraline Hcl] Other (See Comments)     nightmares    Lorazepam      Other reaction(s): Unknown

## 2024-06-20 ENCOUNTER — HOSPITAL ENCOUNTER (EMERGENCY)
Age: 34
Discharge: HOME | End: 2024-06-20
Attending: EMERGENCY MEDICINE
Payer: MEDICARE

## 2024-06-20 ENCOUNTER — APPOINTMENT (OUTPATIENT)
Dept: CT IMAGING | Age: 34
End: 2024-06-20
Payer: MEDICARE

## 2024-06-20 VITALS
OXYGEN SATURATION: 97 % | TEMPERATURE: 98.8 F | SYSTOLIC BLOOD PRESSURE: 119 MMHG | HEART RATE: 96 BPM | DIASTOLIC BLOOD PRESSURE: 76 MMHG | RESPIRATION RATE: 20 BRPM

## 2024-06-20 DIAGNOSIS — F41.0 PANIC ATTACK: Primary | ICD-10-CM

## 2024-06-20 LAB
AMPHET UR QL SCN: NEGATIVE
ANION GAP SERPL CALCULATED.3IONS-SCNC: 18 MMOL/L (ref 9–17)
APAP SERPL-MCNC: <5 UG/ML (ref 10–30)
BACTERIA URNS QL MICRO: ABNORMAL
BARBITURATES UR QL SCN: NEGATIVE
BASOPHILS # BLD: 0.09 K/UL (ref 0–0.2)
BASOPHILS NFR BLD: 1 % (ref 0–2)
BENZODIAZ UR QL: NEGATIVE
BILIRUB UR QL STRIP: NEGATIVE
BUN SERPL-MCNC: 19 MG/DL (ref 6–20)
BUN/CREAT SERPL: 19 (ref 9–20)
CALCIUM SERPL-MCNC: 9.7 MG/DL (ref 8.6–10.4)
CANNABINOIDS UR QL SCN: POSITIVE
CHARACTER UR: ABNORMAL
CHLORIDE SERPL-SCNC: 101 MMOL/L (ref 98–107)
CLARITY UR: CLEAR
CO2 SERPL-SCNC: 17 MMOL/L (ref 20–31)
COCAINE UR QL SCN: NEGATIVE
COLOR UR: YELLOW
CREAT SERPL-MCNC: 1 MG/DL (ref 0.7–1.2)
EOSINOPHIL # BLD: <0.03 K/UL (ref 0–0.44)
EOSINOPHILS RELATIVE PERCENT: 0 % (ref 1–4)
EPI CELLS #/AREA URNS HPF: ABNORMAL /HPF (ref 0–5)
ERYTHROCYTE [DISTWIDTH] IN BLOOD BY AUTOMATED COUNT: 12.5 % (ref 11.8–14.4)
ETHANOLAMINE SERPL-MCNC: <10 MG/DL (ref 0–0.08)
FENTANYL UR QL: NEGATIVE
GFR, ESTIMATED: >90 ML/MIN/1.73M2
GLUCOSE SERPL-MCNC: 124 MG/DL (ref 70–99)
GLUCOSE UR STRIP-MCNC: NEGATIVE MG/DL
HCT VFR BLD AUTO: 41.2 % (ref 40.7–50.3)
HGB BLD-MCNC: 15 G/DL (ref 13–17)
HGB UR QL STRIP.AUTO: ABNORMAL
IMM GRANULOCYTES # BLD AUTO: 0.09 K/UL (ref 0–0.3)
IMM GRANULOCYTES NFR BLD: 1 %
INR PPP: 1.2
KETONES UR STRIP-MCNC: ABNORMAL MG/DL
LEUKOCYTE ESTERASE UR QL STRIP: NEGATIVE
LYMPHOCYTES NFR BLD: 3.22 K/UL (ref 1.1–3.7)
LYMPHOCYTES RELATIVE PERCENT: 20 % (ref 24–43)
MCH RBC QN AUTO: 30.7 PG (ref 25.2–33.5)
MCHC RBC AUTO-ENTMCNC: 36.4 G/DL (ref 25.2–33.5)
MCV RBC AUTO: 84.3 FL (ref 82.6–102.9)
METHADONE UR QL: NEGATIVE
MONOCYTES NFR BLD: 0.49 K/UL (ref 0.1–1.2)
MONOCYTES NFR BLD: 3 % (ref 3–12)
NEUTROPHILS NFR BLD: 75 % (ref 36–65)
NEUTS SEG NFR BLD: 12.43 K/UL (ref 1.5–8.1)
NITRITE UR QL STRIP: NEGATIVE
NRBC BLD-RTO: 0 PER 100 WBC
OPIATES UR QL SCN: NEGATIVE
OXYCODONE UR QL SCN: NEGATIVE
PCP UR QL SCN: NEGATIVE
PH UR STRIP: 5.5 [PH] (ref 5–6)
PLATELET # BLD AUTO: 383 K/UL (ref 138–453)
PMV BLD AUTO: 9.2 FL (ref 8.1–13.5)
POTASSIUM SERPL-SCNC: 4.1 MMOL/L (ref 3.7–5.3)
PROT UR STRIP-MCNC: NEGATIVE MG/DL
PROTHROMBIN TIME: 14.3 SEC (ref 11.5–14.2)
RBC # BLD AUTO: 4.89 M/UL (ref 4.21–5.77)
RBC #/AREA URNS HPF: ABNORMAL /HPF (ref 0–4)
SALICYLATES SERPL-MCNC: <1 MG/DL (ref 3–10)
SODIUM SERPL-SCNC: 136 MMOL/L (ref 135–144)
SP GR UR STRIP: 1.01 (ref 1.01–1.02)
TEST INFORMATION: ABNORMAL
TROPONIN I SERPL HS-MCNC: 7 NG/L (ref 0–22)
UROBILINOGEN UR STRIP-ACNC: NORMAL EU/DL (ref 0–1)
WBC #/AREA URNS HPF: ABNORMAL /HPF (ref 0–4)
WBC OTHER # BLD: 16.3 K/UL (ref 3.5–11.3)

## 2024-06-20 PROCEDURE — 81001 URINALYSIS AUTO W/SCOPE: CPT

## 2024-06-20 PROCEDURE — 84484 ASSAY OF TROPONIN QUANT: CPT

## 2024-06-20 PROCEDURE — G0480 DRUG TEST DEF 1-7 CLASSES: HCPCS

## 2024-06-20 PROCEDURE — 80307 DRUG TEST PRSMV CHEM ANLYZR: CPT

## 2024-06-20 PROCEDURE — 6360000004 HC RX CONTRAST MEDICATION: Performed by: EMERGENCY MEDICINE

## 2024-06-20 PROCEDURE — 99285 EMERGENCY DEPT VISIT HI MDM: CPT

## 2024-06-20 PROCEDURE — 36415 COLL VENOUS BLD VENIPUNCTURE: CPT

## 2024-06-20 PROCEDURE — 96374 THER/PROPH/DIAG INJ IV PUSH: CPT

## 2024-06-20 PROCEDURE — 85025 COMPLETE CBC W/AUTO DIFF WBC: CPT

## 2024-06-20 PROCEDURE — 2709999900 CT ABDOMEN PELVIS W IV CONTRAST

## 2024-06-20 PROCEDURE — 80179 DRUG ASSAY SALICYLATE: CPT

## 2024-06-20 PROCEDURE — 80143 DRUG ASSAY ACETAMINOPHEN: CPT

## 2024-06-20 PROCEDURE — 80048 BASIC METABOLIC PNL TOTAL CA: CPT

## 2024-06-20 PROCEDURE — 6360000002 HC RX W HCPCS: Performed by: EMERGENCY MEDICINE

## 2024-06-20 PROCEDURE — 85610 PROTHROMBIN TIME: CPT

## 2024-06-20 RX ORDER — LORAZEPAM 2 MG/ML
2 INJECTION INTRAMUSCULAR ONCE
Status: COMPLETED | OUTPATIENT
Start: 2024-06-20 | End: 2024-06-20

## 2024-06-20 RX ORDER — LORAZEPAM 2 MG/ML
1 INJECTION INTRAMUSCULAR ONCE
Status: DISCONTINUED | OUTPATIENT
Start: 2024-06-20 | End: 2024-06-20

## 2024-06-20 RX ADMIN — IOPAMIDOL 100 ML: 755 INJECTION, SOLUTION INTRAVENOUS at 20:13

## 2024-06-20 RX ADMIN — LORAZEPAM 2 MG: 2 INJECTION INTRAMUSCULAR; INTRAVENOUS at 19:11

## 2024-06-20 ASSESSMENT — PAIN DESCRIPTION - PAIN TYPE: TYPE: ACUTE PAIN

## 2024-06-20 ASSESSMENT — PAIN DESCRIPTION - ONSET: ONSET: ON-GOING

## 2024-06-20 ASSESSMENT — PAIN DESCRIPTION - DESCRIPTORS: DESCRIPTORS: CRAMPING

## 2024-06-20 ASSESSMENT — PAIN DESCRIPTION - ORIENTATION: ORIENTATION: LOWER

## 2024-06-20 ASSESSMENT — PAIN - FUNCTIONAL ASSESSMENT: PAIN_FUNCTIONAL_ASSESSMENT: INTOLERABLE, UNABLE TO DO ANY ACTIVE OR PASSIVE ACTIVITIES

## 2024-06-20 ASSESSMENT — PAIN SCALES - GENERAL: PAINLEVEL_OUTOF10: 7

## 2024-06-20 ASSESSMENT — PAIN DESCRIPTION - FREQUENCY: FREQUENCY: CONTINUOUS

## 2024-06-20 ASSESSMENT — LIFESTYLE VARIABLES: HOW OFTEN DO YOU HAVE A DRINK CONTAINING ALCOHOL: NEVER

## 2024-06-20 ASSESSMENT — PAIN DESCRIPTION - LOCATION: LOCATION: ABDOMEN
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
## 2024-06-21 NOTE — ED PROVIDER NOTES
NEGATIVE    Ketones, Urine 2+ (A) NEGATIVE mg/dL    Specific Gravity, UA 1.015 1.010 - 1.025    Urine Hgb TRACE (A) NEGATIVE    pH, Urine 5.5 5.0 - 6.0    Protein, UA NEGATIVE NEGATIVE mg/dL    Urobilinogen, Urine Normal 0.0 - 1.0 EU/dL    Nitrite, Urine NEGATIVE NEGATIVE    Leukocyte Esterase, Urine NEGATIVE NEGATIVE   Troponin   Result Value Ref Range    Troponin, High Sensitivity 7 0 - 22 ng/L   Protime-INR   Result Value Ref Range    Protime 14.3 (H) 11.5 - 14.2 sec    INR 1.2    Microscopic Urinalysis   Result Value Ref Range    WBC, UA None 0 - 4 /HPF    RBC, UA None 0 - 4 /HPF    Epithelial Cells, UA 0 TO 2 0 - 5 /HPF    Bacteria, UA None None    Other Observations UA (A) NOT REQ.     Utilizing a urinalysis as the only screening method to exclude a potential uropathogen can be unreliable in many patient populations.  Rapid screening tests are less sensitive than culture and if UTI is a clinical possibility, culture should be considered despite a negative urinalysis.         RADIOLOGY:  CT ABDOMEN PELVIS W IV CONTRAST Additional Contrast? None   Final Result   No CT evidence of acute process in the abdomen or pelvis.             RECENT VITALS:  BP: 119/76, Temp: 98.8 °F (37.1 °C), Pulse: 96, Respirations: 20     ED Course     The patient was given the following medications:  Orders Placed This Encounter   Medications    DISCONTD: LORazepam (ATIVAN) injection 1 mg    LORazepam (ATIVAN) injection 2 mg    iopamidol (ISOVUE-370) 76 % injection 100 mL       Medical Decision Making               EMERGENCY DEPARTMENT COURSE:   Vitals:    Vitals:    06/20/24 1830   BP: 119/76   Pulse: 96   Resp: 20   Temp: 98.8 °F (37.1 °C)   TempSrc: Tympanic   SpO2: 97%     -------------------------  BP: 119/76, Temp: 98.8 °F (37.1 °C), Pulse: 96, Respirations: 20      RE-EVALUATION:  Patient states he is feeling much better and does not want to wait for evaluation wants to go home mother is here she states she has been through this

## 2024-06-22 ASSESSMENT — ENCOUNTER SYMPTOMS
VOMITING: 0
EYE REDNESS: 0
DIARRHEA: 0
COUGH: 0
BLOOD IN STOOL: 1
CONSTIPATION: 0
SHORTNESS OF BREATH: 0
CHEST TIGHTNESS: 0
ABDOMINAL PAIN: 1
NAUSEA: 0

## 2024-08-13 ENCOUNTER — HOSPITAL ENCOUNTER (OUTPATIENT)
Dept: CT IMAGING | Age: 34
Discharge: HOME | End: 2024-08-15
Payer: MEDICARE

## 2024-08-13 ENCOUNTER — OFFICE VISIT (OUTPATIENT)
Dept: FAMILY MEDICINE CLINIC | Age: 34
End: 2024-08-13

## 2024-08-13 ENCOUNTER — HOSPITAL ENCOUNTER (OUTPATIENT)
Age: 34
Discharge: HOME | End: 2024-08-13
Payer: MEDICARE

## 2024-08-13 VITALS
OXYGEN SATURATION: 97 % | DIASTOLIC BLOOD PRESSURE: 60 MMHG | HEART RATE: 68 BPM | WEIGHT: 157.5 LBS | HEIGHT: 69 IN | SYSTOLIC BLOOD PRESSURE: 104 MMHG | BODY MASS INDEX: 23.33 KG/M2

## 2024-08-13 DIAGNOSIS — R10.11 RIGHT UPPER QUADRANT ABDOMINAL PAIN: ICD-10-CM

## 2024-08-13 DIAGNOSIS — M25.562 CHRONIC PAIN OF BOTH KNEES: Primary | ICD-10-CM

## 2024-08-13 DIAGNOSIS — F12.188 CANNABIS HYPEREMESIS SYNDROME CONCURRENT WITH AND DUE TO CANNABIS ABUSE (HCC): ICD-10-CM

## 2024-08-13 DIAGNOSIS — E05.90 HYPERTHYROIDISM: ICD-10-CM

## 2024-08-13 DIAGNOSIS — F33.3 MDD (MAJOR DEPRESSIVE DISORDER), RECURRENT, SEVERE, WITH PSYCHOSIS (HCC): ICD-10-CM

## 2024-08-13 DIAGNOSIS — M25.561 CHRONIC PAIN OF BOTH KNEES: Primary | ICD-10-CM

## 2024-08-13 DIAGNOSIS — F31.60 BIPOLAR I DISORDER, MOST RECENT EPISODE (OR CURRENT) MIXED (HCC): ICD-10-CM

## 2024-08-13 DIAGNOSIS — R56.9 SEIZURE-LIKE ACTIVITY (HCC): ICD-10-CM

## 2024-08-13 DIAGNOSIS — G89.29 CHRONIC PAIN OF BOTH KNEES: Primary | ICD-10-CM

## 2024-08-13 LAB
ALBUMIN SERPL-MCNC: 4.3 G/DL (ref 3.5–5.2)
ALBUMIN/GLOB SERPL: 1.7 {RATIO} (ref 1–2.5)
ALP SERPL-CCNC: 94 U/L (ref 40–129)
ALT SERPL-CCNC: 26 U/L (ref 5–41)
ANION GAP SERPL CALCULATED.3IONS-SCNC: 7 MMOL/L (ref 9–17)
AST SERPL-CCNC: 14 U/L
BASOPHILS # BLD: 0.06 K/UL (ref 0–0.2)
BASOPHILS NFR BLD: 1 % (ref 0–2)
BILIRUB SERPL-MCNC: 0.5 MG/DL (ref 0.3–1.2)
BUN SERPL-MCNC: 11 MG/DL (ref 6–20)
BUN/CREAT SERPL: 14 (ref 9–20)
CALCIUM SERPL-MCNC: 9 MG/DL (ref 8.6–10.4)
CHLORIDE SERPL-SCNC: 106 MMOL/L (ref 98–107)
CO2 SERPL-SCNC: 26 MMOL/L (ref 20–31)
CREAT SERPL-MCNC: 0.8 MG/DL (ref 0.7–1.2)
EOSINOPHIL # BLD: 0.17 K/UL (ref 0–0.44)
EOSINOPHILS RELATIVE PERCENT: 2 % (ref 1–4)
ERYTHROCYTE [DISTWIDTH] IN BLOOD BY AUTOMATED COUNT: 13.4 % (ref 11.8–14.4)
GFR, ESTIMATED: >90 ML/MIN/1.73M2
GLUCOSE SERPL-MCNC: 91 MG/DL (ref 70–99)
HCT VFR BLD AUTO: 39.5 % (ref 40.7–50.3)
HGB BLD-MCNC: 13.4 G/DL (ref 13–17)
IMM GRANULOCYTES # BLD AUTO: <0.03 K/UL (ref 0–0.3)
IMM GRANULOCYTES NFR BLD: 0 %
LYMPHOCYTES NFR BLD: 4.75 K/UL (ref 1.1–3.7)
LYMPHOCYTES RELATIVE PERCENT: 52 % (ref 24–43)
MCH RBC QN AUTO: 30.3 PG (ref 25.2–33.5)
MCHC RBC AUTO-ENTMCNC: 33.9 G/DL (ref 25.2–33.5)
MCV RBC AUTO: 89.4 FL (ref 82.6–102.9)
MONOCYTES NFR BLD: 0.66 K/UL (ref 0.1–1.2)
MONOCYTES NFR BLD: 7 % (ref 3–12)
NEUTROPHILS NFR BLD: 38 % (ref 36–65)
NEUTS SEG NFR BLD: 3.4 K/UL (ref 1.5–8.1)
NRBC BLD-RTO: 0 PER 100 WBC
PLATELET # BLD AUTO: 319 K/UL (ref 138–453)
PMV BLD AUTO: 9.3 FL (ref 8.1–13.5)
POTASSIUM SERPL-SCNC: 4.2 MMOL/L (ref 3.7–5.3)
PROT SERPL-MCNC: 6.8 G/DL (ref 6.4–8.3)
RBC # BLD AUTO: 4.42 M/UL (ref 4.21–5.77)
SODIUM SERPL-SCNC: 139 MMOL/L (ref 135–144)
T4 FREE SERPL-MCNC: 1.4 NG/DL (ref 0.92–1.68)
TSH SERPL DL<=0.05 MIU/L-ACNC: 0.17 UIU/ML (ref 0.3–5)
WBC OTHER # BLD: 9.1 K/UL (ref 3.5–11.3)

## 2024-08-13 PROCEDURE — 84439 ASSAY OF FREE THYROXINE: CPT

## 2024-08-13 PROCEDURE — 36415 COLL VENOUS BLD VENIPUNCTURE: CPT

## 2024-08-13 PROCEDURE — 80053 COMPREHEN METABOLIC PANEL: CPT

## 2024-08-13 PROCEDURE — 84443 ASSAY THYROID STIM HORMONE: CPT

## 2024-08-13 PROCEDURE — 6360000004 HC RX CONTRAST MEDICATION: Performed by: FAMILY MEDICINE

## 2024-08-13 PROCEDURE — 85025 COMPLETE CBC W/AUTO DIFF WBC: CPT

## 2024-08-13 PROCEDURE — 74177 CT ABD & PELVIS W/CONTRAST: CPT

## 2024-08-13 RX ADMIN — IOPAMIDOL 100 ML: 755 INJECTION, SOLUTION INTRAVENOUS at 16:30

## 2024-08-13 ASSESSMENT — ENCOUNTER SYMPTOMS
VOMITING: 1
BELCHING: 1
HEMATOCHEZIA: 1
DIARRHEA: 1
ABDOMINAL PAIN: 1
FLATUS: 1
CONSTIPATION: 1
CHEST TIGHTNESS: 0
SHORTNESS OF BREATH: 0
WHEEZING: 0
COUGH: 0

## 2024-08-15 RX ORDER — LIOTHYRONINE SODIUM 5 UG/1
5 TABLET ORAL 3 TIMES DAILY
Qty: 90 TABLET | Refills: 3 | Status: SHIPPED | OUTPATIENT
Start: 2024-08-15

## 2024-10-28 NOTE — TELEPHONE ENCOUNTER
David Orantes called requesting a refill of the below medication which has been pended for you:     Requested Prescriptions     Pending Prescriptions Disp Refills    propranolol (INDERAL) 10 MG tablet [Pharmacy Med Name: Propranolol HCl 10 MG Oral Tablet] 90 tablet 2     Sig: TAKE 1 TABLET BY MOUTH THREE TIMES DAILY    cyclobenzaprine (FLEXERIL) 5 MG tablet [Pharmacy Med Name: Cyclobenzaprine HCl 5 MG Oral Tablet] 30 tablet 2     Sig: TAKE 1 TABLET BY MOUTH NIGHTLY AS NEEDED FOR MUSCLE SPASM       Last Appointment Date: 9/23/2021  Next Appointment Date: 4/19/2022    Allergies   Allergen Reactions    Haldol [Haloperidol]      EPS    Zoloft [Sertraline Hcl] Other (See Comments)     nightmares labs significant for leukocytosis 49, ARK, uremia, acidosis, bicarb of 9, lactate of 5, transamintis  heme onc called for 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 Thomason, recommending micu consult. MICU aware, will evaluate. Surg also to evaluate for possible acute tomy on US.  Rodrigo Snow PGY-3 Singh Newman MD: I have been given all relevant clinical information regarding this patient and will be assuming care from Dr. Snow.  MICU saw patient, they feel patient is stable for admission on telemetry.  MAR made aware, will evaluate patient.

## 2024-11-07 ENCOUNTER — OFFICE VISIT (OUTPATIENT)
Dept: FAMILY MEDICINE CLINIC | Age: 34
End: 2024-11-07

## 2024-11-07 VITALS
HEIGHT: 69 IN | BODY MASS INDEX: 23.73 KG/M2 | DIASTOLIC BLOOD PRESSURE: 64 MMHG | HEART RATE: 75 BPM | WEIGHT: 160.2 LBS | OXYGEN SATURATION: 97 % | SYSTOLIC BLOOD PRESSURE: 102 MMHG

## 2024-11-07 DIAGNOSIS — L20.82 FLEXURAL ECZEMA: ICD-10-CM

## 2024-11-07 DIAGNOSIS — F41.9 ANXIETY: ICD-10-CM

## 2024-11-07 DIAGNOSIS — Z00.00 WELCOME TO MEDICARE PREVENTIVE VISIT: Primary | ICD-10-CM

## 2024-11-07 DIAGNOSIS — K21.00 GASTROESOPHAGEAL REFLUX DISEASE WITH ESOPHAGITIS WITHOUT HEMORRHAGE: ICD-10-CM

## 2024-11-07 DIAGNOSIS — E05.90 HYPERTHYROIDISM: ICD-10-CM

## 2024-11-07 ASSESSMENT — PATIENT HEALTH QUESTIONNAIRE - PHQ9
3. TROUBLE FALLING OR STAYING ASLEEP: NOT AT ALL
SUM OF ALL RESPONSES TO PHQ9 QUESTIONS 1 & 2: 0
1. LITTLE INTEREST OR PLEASURE IN DOING THINGS: NOT AT ALL
SUM OF ALL RESPONSES TO PHQ QUESTIONS 1-9: 0
SUM OF ALL RESPONSES TO PHQ QUESTIONS 1-9: 0
9. THOUGHTS THAT YOU WOULD BE BETTER OFF DEAD, OR OF HURTING YOURSELF: NOT AT ALL
5. POOR APPETITE OR OVEREATING: NOT AT ALL
8. MOVING OR SPEAKING SO SLOWLY THAT OTHER PEOPLE COULD HAVE NOTICED. OR THE OPPOSITE, BEING SO FIGETY OR RESTLESS THAT YOU HAVE BEEN MOVING AROUND A LOT MORE THAN USUAL: NOT AT ALL
SUM OF ALL RESPONSES TO PHQ QUESTIONS 1-9: 0
SUM OF ALL RESPONSES TO PHQ QUESTIONS 1-9: 0
7. TROUBLE CONCENTRATING ON THINGS, SUCH AS READING THE NEWSPAPER OR WATCHING TELEVISION: NOT AT ALL
10. IF YOU CHECKED OFF ANY PROBLEMS, HOW DIFFICULT HAVE THESE PROBLEMS MADE IT FOR YOU TO DO YOUR WORK, TAKE CARE OF THINGS AT HOME, OR GET ALONG WITH OTHER PEOPLE: NOT DIFFICULT AT ALL
6. FEELING BAD ABOUT YOURSELF - OR THAT YOU ARE A FAILURE OR HAVE LET YOURSELF OR YOUR FAMILY DOWN: NOT AT ALL
2. FEELING DOWN, DEPRESSED OR HOPELESS: NOT AT ALL
4. FEELING TIRED OR HAVING LITTLE ENERGY: NOT AT ALL

## 2024-11-07 ASSESSMENT — ENCOUNTER SYMPTOMS
COUGH: 0
VOMITING: 0
ABDOMINAL PAIN: 0
WHEEZING: 0
NAUSEA: 0
CHEST TIGHTNESS: 0
SHORTNESS OF BREATH: 0

## 2024-11-07 ASSESSMENT — LIFESTYLE VARIABLES
HOW MANY STANDARD DRINKS CONTAINING ALCOHOL DO YOU HAVE ON A TYPICAL DAY: 1 OR 2
HOW OFTEN DO YOU HAVE A DRINK CONTAINING ALCOHOL: MONTHLY OR LESS

## 2024-11-07 NOTE — PROGRESS NOTES
Medicare Annual Wellness Visit    Anival Alberts is here for Medicare AWV    Assessment & Plan   Welcome to Medicare preventive visit  Anxiety  Gastroesophageal reflux disease with esophagitis without hemorrhage  Hyperthyroidism  -     TSH With Reflex Ft4; Future  Flexural eczema    Recommendations for Preventive Services Due: see orders and patient instructions/AVS.  Recommended screening schedule for the next 5-10 years is provided to the patient in written form: see Patient Instructions/AVS.     Return in about 6 months (around 5/7/2025) for Anxiety follow up.     Subjective   The following acute and/or chronic problems were also addressed today:  Anxiety, hyperthyroidism    Patient's complete Health Risk Assessment and screening values have been reviewed and are found in Flowsheets. The following problems were reviewed today and where indicated follow up appointments were made and/or referrals ordered.    Positive Risk Factor Screenings with Interventions:        Drug Use:   Substance and Sexual Activity   Drug Use Yes    Frequency: 21.0 times per week    Types: Marijuana (Weed)    Comment: 6-20-24 reports using marijuana multiple times a day, states has a medical card     DAST-10 Score: 2  Interpretation: 1-2 indicates low level use. Recommendation: monitor and re-assess at a later date  Interventions:  Patient declined any further intervention or treatment            Dentist Screen:  Have you seen the dentist within the past year?: (!) No    Intervention:  Advised to schedule with their dentist     Vision Screen:  Do you have difficulty driving, watching TV, or doing any of your daily activities because of your eyesight?: (!) Yes  Have you had an eye exam within the past year?: (!) No  Interventions:   Patient encouraged to make appointment with their eye specialist      Advanced Directives:  Do you have a Living Will?: (!) No    Intervention:  has NO advanced directive - not interested in additional 
History of acute renal failure     due to dehydration    History of stomach ulcers     Irritable bowel syndrome     Ulcer      Past Surgical History:   Procedure Laterality Date    APPENDECTOMY      COLONOSCOPY      UPPER GASTROINTESTINAL ENDOSCOPY      WISDOM TOOTH EXTRACTION         Family History   Problem Relation Age of Onset    Kidney Disease Mother     Other Mother         Gastroesophageal reflux disease    Allergies Brother     Cancer Maternal Uncle         Great-uncle    Crohn's Disease Maternal Uncle     Other Father         drug overdose       Social History     Tobacco Use    Smoking status: Former     Current packs/day: 0.00     Types: Cigarettes     Quit date: 2019     Years since quittin.4    Smokeless tobacco: Never    Tobacco comments:     syant rrt 2019   Substance Use Topics    Alcohol use: Yes     Alcohol/week: 2.0 standard drinks of alcohol     Types: 2 Cans of beer per week     Comment: stopped drinking 7 months ago - 2018      Prior to Visit Medications    Medication Sig Taking? Authorizing Provider   liothyronine (CYTOMEL) 5 MCG tablet Take 1 tablet by mouth in the morning, at noon, and at bedtime Yes Ginger Sams MD   diclofenac sodium (VOLTAREN) 1 % GEL Apply 2 g topically 4 times daily as needed for Pain (knee pain) Yes Ginger Sams MD   atorvastatin (LIPITOR) 20 MG tablet Take 1 tablet by mouth once daily Yes Ginger Sams MD   fluticasone (FLONASE) 50 MCG/ACT nasal spray 1 spray by Each Nostril route in the morning and at bedtime Yes Ginger Sams MD   pantoprazole (PROTONIX) 40 MG tablet Take 1 tablet by mouth daily Yes Ginger Sams MD     Allergies   Allergen Reactions    Haldol [Haloperidol]      EPS    Zoloft [Sertraline Hcl] Other (See Comments)     nightmares    Lorazepam      Other reaction(s): Unknown       Subjective:      Review of Systems   Constitutional:  Negative for activity change, appetite change, chills, fatigue and fever.   Eyes:

## 2024-11-07 NOTE — PATIENT INSTRUCTIONS
Personalized Preventive Plan for Anival Alberts - 11/7/2024  Medicare offers a range of preventive health benefits. Some of the tests and screenings are paid in full while other may be subject to a deductible, co-insurance, and/or copay.    Some of these benefits include a comprehensive review of your medical history including lifestyle, illnesses that may run in your family, and various assessments and screenings as appropriate.    After reviewing your medical record and screening and assessments performed today your provider may have ordered immunizations, labs, imaging, and/or referrals for you.  A list of these orders (if applicable) as well as your Preventive Care list are included within your After Visit Summary for your review.    Other Preventive Recommendations:    A preventive eye exam performed by an eye specialist is recommended every 1-2 years to screen for glaucoma; cataracts, macular degeneration, and other eye disorders.  A preventive dental visit is recommended every 6 months.  Try to get at least 150 minutes of exercise per week or 10,000 steps per day on a pedometer .  Order or download the FREE \"Exercise & Physical Activity: Your Everyday Guide\" from [employer]. Call 1-255.649.5178 or search [employer] online.  You need 4893-6356 mg of calcium and 6754-2369 IU of vitamin D per day. It is possible to meet your calcium requirement with diet alone, but a vitamin D supplement is usually necessary to meet this goal.  When exposed to the sun, use a sunscreen that protects against both UVA and UVB radiation with an SPF of 30 or greater. Reapply every 2 to 3 hours or after sweating, drying off with a towel, or swimming.  Always wear a seat belt when traveling in a car. Always wear a helmet when riding a bicycle or motorcycle.

## 2025-03-31 NOTE — TELEPHONE ENCOUNTER
Brendan advised and verbalized understanding. He is aware of warnings and will stop the medication so he can take diclofenac. Please send to [facility]   [facility] follow up/dod 1-15

## 2025-05-09 ENCOUNTER — OFFICE VISIT (OUTPATIENT)
Dept: FAMILY MEDICINE CLINIC | Age: 35
End: 2025-05-09

## 2025-05-09 ENCOUNTER — HOSPITAL ENCOUNTER (OUTPATIENT)
Age: 35
Discharge: HOME | End: 2025-05-09
Payer: MEDICARE

## 2025-05-09 VITALS
HEIGHT: 69 IN | WEIGHT: 167.8 LBS | OXYGEN SATURATION: 98 % | DIASTOLIC BLOOD PRESSURE: 64 MMHG | HEART RATE: 74 BPM | SYSTOLIC BLOOD PRESSURE: 110 MMHG | BODY MASS INDEX: 24.85 KG/M2

## 2025-05-09 DIAGNOSIS — M25.561 CHRONIC PAIN OF BOTH KNEES: Primary | ICD-10-CM

## 2025-05-09 DIAGNOSIS — E78.2 MIXED HYPERLIPIDEMIA: ICD-10-CM

## 2025-05-09 DIAGNOSIS — E05.90 HYPERTHYROIDISM: ICD-10-CM

## 2025-05-09 DIAGNOSIS — G89.29 CHRONIC PAIN OF BOTH KNEES: Primary | ICD-10-CM

## 2025-05-09 DIAGNOSIS — M25.562 CHRONIC PAIN OF BOTH KNEES: Primary | ICD-10-CM

## 2025-05-09 DIAGNOSIS — F33.3 MDD (MAJOR DEPRESSIVE DISORDER), RECURRENT, SEVERE, WITH PSYCHOSIS (HCC): ICD-10-CM

## 2025-05-09 DIAGNOSIS — F41.9 ANXIETY: ICD-10-CM

## 2025-05-09 DIAGNOSIS — F12.188 CANNABIS HYPEREMESIS SYNDROME CONCURRENT WITH AND DUE TO CANNABIS ABUSE (HCC): ICD-10-CM

## 2025-05-09 PROBLEM — R06.02 SHORTNESS OF BREATH: Status: RESOLVED | Noted: 2018-03-16 | Resolved: 2025-05-09

## 2025-05-09 PROBLEM — N17.9 ACUTE KIDNEY INJURY: Status: RESOLVED | Noted: 2018-04-13 | Resolved: 2025-05-09

## 2025-05-09 PROBLEM — R11.10 VOMITING AND DIARRHEA: Status: RESOLVED | Noted: 2018-03-16 | Resolved: 2025-05-09

## 2025-05-09 PROBLEM — R56.9 SEIZURE-LIKE ACTIVITY (HCC): Status: RESOLVED | Noted: 2021-06-22 | Resolved: 2025-05-09

## 2025-05-09 PROBLEM — K21.9 GASTROESOPHAGEAL REFLUX DISEASE WITHOUT ESOPHAGITIS: Status: RESOLVED | Noted: 2020-07-16 | Resolved: 2025-05-09

## 2025-05-09 PROBLEM — F32.9 MDD (MAJOR DEPRESSIVE DISORDER): Status: RESOLVED | Noted: 2018-08-24 | Resolved: 2025-05-09

## 2025-05-09 PROBLEM — R19.7 VOMITING AND DIARRHEA: Status: RESOLVED | Noted: 2018-03-16 | Resolved: 2025-05-09

## 2025-05-09 PROBLEM — R45.851 SUICIDAL IDEATION: Status: RESOLVED | Noted: 2018-08-25 | Resolved: 2025-05-09

## 2025-05-09 PROBLEM — T51.91XA ACCIDENTAL POISONING BY ALCOHOL: Status: RESOLVED | Noted: 2018-07-01 | Resolved: 2025-05-09

## 2025-05-09 LAB
ALBUMIN SERPL-MCNC: 4.6 G/DL (ref 3.5–5.2)
ALBUMIN/GLOB SERPL: 1.8 {RATIO} (ref 1–2.5)
ALP SERPL-CCNC: 97 U/L (ref 40–129)
ALT SERPL-CCNC: 25 U/L (ref 10–50)
ANION GAP SERPL CALCULATED.3IONS-SCNC: 12 MMOL/L (ref 9–16)
AST SERPL-CCNC: 17 U/L (ref 10–50)
BILIRUB SERPL-MCNC: 0.4 MG/DL (ref 0–1.2)
BUN SERPL-MCNC: 15 MG/DL (ref 6–20)
BUN/CREAT SERPL: 19 (ref 9–20)
CALCIUM SERPL-MCNC: 9.4 MG/DL (ref 8.6–10.4)
CHLORIDE SERPL-SCNC: 104 MMOL/L (ref 98–107)
CHOLEST SERPL-MCNC: 235 MG/DL (ref 0–199)
CHOLESTEROL/HDL RATIO: 6.9
CO2 SERPL-SCNC: 25 MMOL/L (ref 20–31)
CREAT SERPL-MCNC: 0.8 MG/DL (ref 0.7–1.2)
GFR, ESTIMATED: >90 ML/MIN/1.73M2
GLUCOSE SERPL-MCNC: 92 MG/DL (ref 74–99)
HDLC SERPL-MCNC: 34 MG/DL
LDLC SERPL CALC-MCNC: 167 MG/DL (ref 0–100)
POTASSIUM SERPL-SCNC: 4.3 MMOL/L (ref 3.7–5.3)
PROT SERPL-MCNC: 7.2 G/DL (ref 6.6–8.7)
SODIUM SERPL-SCNC: 141 MMOL/L (ref 136–145)
TRIGL SERPL-MCNC: 169 MG/DL
TSH SERPL DL<=0.05 MIU/L-ACNC: 0.32 UIU/ML (ref 0.27–4.2)
VLDLC SERPL CALC-MCNC: 34 MG/DL (ref 1–30)

## 2025-05-09 PROCEDURE — 84443 ASSAY THYROID STIM HORMONE: CPT

## 2025-05-09 PROCEDURE — 80061 LIPID PANEL: CPT

## 2025-05-09 PROCEDURE — 36415 COLL VENOUS BLD VENIPUNCTURE: CPT

## 2025-05-09 PROCEDURE — 80053 COMPREHEN METABOLIC PANEL: CPT

## 2025-05-09 SDOH — ECONOMIC STABILITY: FOOD INSECURITY: WITHIN THE PAST 12 MONTHS, YOU WORRIED THAT YOUR FOOD WOULD RUN OUT BEFORE YOU GOT MONEY TO BUY MORE.: NEVER TRUE

## 2025-05-09 SDOH — ECONOMIC STABILITY: FOOD INSECURITY: WITHIN THE PAST 12 MONTHS, THE FOOD YOU BOUGHT JUST DIDN'T LAST AND YOU DIDN'T HAVE MONEY TO GET MORE.: NEVER TRUE

## 2025-05-09 ASSESSMENT — PATIENT HEALTH QUESTIONNAIRE - PHQ9
1. LITTLE INTEREST OR PLEASURE IN DOING THINGS: NOT AT ALL
SUM OF ALL RESPONSES TO PHQ QUESTIONS 1-9: 0
4. FEELING TIRED OR HAVING LITTLE ENERGY: NOT AT ALL
6. FEELING BAD ABOUT YOURSELF - OR THAT YOU ARE A FAILURE OR HAVE LET YOURSELF OR YOUR FAMILY DOWN: NOT AT ALL
SUM OF ALL RESPONSES TO PHQ QUESTIONS 1-9: 0
5. POOR APPETITE OR OVEREATING: NOT AT ALL
8. MOVING OR SPEAKING SO SLOWLY THAT OTHER PEOPLE COULD HAVE NOTICED. OR THE OPPOSITE, BEING SO FIGETY OR RESTLESS THAT YOU HAVE BEEN MOVING AROUND A LOT MORE THAN USUAL: NOT AT ALL
SUM OF ALL RESPONSES TO PHQ QUESTIONS 1-9: 0
3. TROUBLE FALLING OR STAYING ASLEEP: NOT AT ALL
7. TROUBLE CONCENTRATING ON THINGS, SUCH AS READING THE NEWSPAPER OR WATCHING TELEVISION: NOT AT ALL
9. THOUGHTS THAT YOU WOULD BE BETTER OFF DEAD, OR OF HURTING YOURSELF: NOT AT ALL
10. IF YOU CHECKED OFF ANY PROBLEMS, HOW DIFFICULT HAVE THESE PROBLEMS MADE IT FOR YOU TO DO YOUR WORK, TAKE CARE OF THINGS AT HOME, OR GET ALONG WITH OTHER PEOPLE: NOT DIFFICULT AT ALL
2. FEELING DOWN, DEPRESSED OR HOPELESS: NOT AT ALL
SUM OF ALL RESPONSES TO PHQ QUESTIONS 1-9: 0

## 2025-05-09 ASSESSMENT — ENCOUNTER SYMPTOMS
BACK PAIN: 1
CHEST TIGHTNESS: 0
WHEEZING: 0
SHORTNESS OF BREATH: 0
COUGH: 0

## 2025-05-09 NOTE — PROGRESS NOTES
[facility] A DEPARTMENT OF [facility]  [address] [facility] [address]  Dept: [phone]  Dept Fax: [phone]  Loc: [phone]    Anival Alberts is a 34 y.o. male who presents today for his medical conditions/complaints as noted below.  Anival Alberts is c/o of   Chief Complaint   Patient presents with    Anxiety     6 months    Other     Discuss alternative for diclofenac gel       HPI:     History of Present Illness  The patient is a 34-year-old male here today for follow-up of his knee pain and anxiety.    He has been engaging in frequent jogging, which has exacerbated his knee discomfort. He reports a sensation of soreness and popping in his knees, particularly when sitting or standing for extended periods. He is uncertain about the presence of swelling. He expresses a preference against injections due to a dislike of needles. He reports no history of tendon tears or knee surgeries. He also reports no falls or instability related to his knees. He has not utilized topical Voltaren gel due to its perceived stickiness but has tried over-the-counter alternatives without significant relief.    He experiences occasional headaches, which he attributes to the recent loss of his dog. He anticipates returning to work within the next 2 years, following consultation with his therapist. He reports infrequent panic attacks and anxiety, which he manages through coping strategies and breathing exercises. He continues to use marijuana, particularly during periods of heightened anxiety. He reports no recent episodes of vomiting, shaking, or passing out. He has made dietary modifications, including reducing fast food intake and increasing home-cooked meals. He has found that avoiding late-night eating has been beneficial.    He has sought chiropractic care for numbness in his back, which he believes is related to holding his infant son.    SOCIAL HISTORY  The

## 2025-05-12 ENCOUNTER — RESULTS FOLLOW-UP (OUTPATIENT)
Dept: FAMILY MEDICINE CLINIC | Age: 35
End: 2025-05-12

## 2025-05-12 RX ORDER — ATORVASTATIN CALCIUM 20 MG/1
20 TABLET, FILM COATED ORAL DAILY
Qty: 90 TABLET | Refills: 3 | Status: CANCELLED | OUTPATIENT
Start: 2025-05-12

## 2025-05-12 RX ORDER — ATORVASTATIN CALCIUM 20 MG/1
20 TABLET, FILM COATED ORAL DAILY
Qty: 90 TABLET | Refills: 3 | Status: SHIPPED | OUTPATIENT
Start: 2025-05-12

## 2025-06-24 ENCOUNTER — TELEPHONE (OUTPATIENT)
Dept: FAMILY MEDICINE CLINIC | Age: 35
End: 2025-06-24

## 2025-06-24 NOTE — TELEPHONE ENCOUNTER
[facility] Care Coordinator calling stating she was speaking with pt and he told her he as having trouble swallowing pills and Cheyenne would like LK to review pt's meds and see is any could be converted to liquid option.   Informed Cheyenne LK out this would and would review meds upon return and we advise pt accordingly.

## 2025-06-30 NOTE — TELEPHONE ENCOUNTER
Honestly, I think he is going to need to work with his counselor about how to work through this issue then because he can crush his pills, but I doubt that will help. He may also need to find a different psychiatrist.

## 2025-06-30 NOTE — TELEPHONE ENCOUNTER
Daily imaging is for INTUBATION. Pt is no longer intubated. If exam needed please revise/ reorder. The only med he takes that comes as a liquid is the protonix. He has never had trouble swallowing pills before, is he getting more anxious? Having worsening reflux? Is he having trouble swallowing food? Is he choking on food or his pills?

## 2025-06-30 NOTE — TELEPHONE ENCOUNTER
Patient states he is seeing his old psychiatrist and discussing the bringing back of memories when he used to be on a lot of medication and would get sick and have to be in the hospital. Patient states he is having trouble taking the medication because it brings back the PTSD of taking and getting ill. States he can't even take tylenol because once its down he throws it up or feels like he is getting sick. Patient states he does not have any trouble swallowing or choking on food